# Patient Record
Sex: FEMALE | Race: WHITE | NOT HISPANIC OR LATINO | Employment: FULL TIME | URBAN - METROPOLITAN AREA
[De-identification: names, ages, dates, MRNs, and addresses within clinical notes are randomized per-mention and may not be internally consistent; named-entity substitution may affect disease eponyms.]

---

## 2017-01-10 ENCOUNTER — OFFICE VISIT (OUTPATIENT)
Dept: GASTROENTEROLOGY | Facility: CLINIC | Age: 22
End: 2017-01-10

## 2017-01-10 VITALS
HEIGHT: 65 IN | WEIGHT: 185 LBS | SYSTOLIC BLOOD PRESSURE: 102 MMHG | BODY MASS INDEX: 30.82 KG/M2 | DIASTOLIC BLOOD PRESSURE: 72 MMHG

## 2017-01-10 DIAGNOSIS — R11.0 NAUSEA: Primary | ICD-10-CM

## 2017-01-10 PROCEDURE — 99203 OFFICE O/P NEW LOW 30 MIN: CPT | Performed by: INTERNAL MEDICINE

## 2017-01-10 RX ORDER — PANTOPRAZOLE SODIUM 40 MG/1
40 TABLET, DELAYED RELEASE ORAL DAILY
Qty: 30 TABLET | Refills: 5 | Status: SHIPPED | OUTPATIENT
Start: 2017-01-10 | End: 2018-02-09 | Stop reason: SDUPTHER

## 2017-01-10 RX ORDER — LEVALBUTEROL TARTRATE 45 UG/1
2 AEROSOL, METERED ORAL EVERY 4 HOURS PRN
COMMUNITY
End: 2020-02-26

## 2017-01-10 RX ORDER — ONDANSETRON 4 MG/1
4 TABLET, ORALLY DISINTEGRATING ORAL EVERY 8 HOURS
COMMUNITY
Start: 2016-12-18 | End: 2017-04-11

## 2017-01-10 RX ORDER — POLYETHYLENE GLYCOL 3350 17 G/17G
17 POWDER, FOR SOLUTION ORAL
COMMUNITY
Start: 2016-12-18 | End: 2017-04-11

## 2017-01-10 RX ORDER — NORETHINDRONE ACETATE AND ETHINYL ESTRADIOL 1MG-20(21)
KIT ORAL
COMMUNITY
End: 2017-04-11 | Stop reason: SDUPTHER

## 2017-01-10 RX ORDER — LORATADINE 10 MG/1
CAPSULE, LIQUID FILLED ORAL
COMMUNITY
End: 2018-04-30

## 2017-01-10 NOTE — LETTER
January 12, 2017     CORONA Valle  [address] [facility] [address]    Patient: Cesia Catalan   YOB: 1995   Date of Visit: 1/10/2017       Dear CORONA Kang:    Thank you for referring Cesia Catalan to me for evaluation. Below is a copy of my consult note.    If you have questions, please do not hesitate to call me. I look forward to following Cesia along with you.         Sincerely,        Jayy Arredondo MD        CC: No Recipients    Chief Complaint   Patient presents with   • Abdominal Pain   • Nausea   • Vomiting     Subjective      HPI     Cesia Catalan is a 21 y.o. female who presents for evaluation of nausea and abdominal pain.  Symptoms present off/on for last few years.  Worse over last few months.   Some improvement with ODT zofran.    Episodes can last few weeks to few months at a time.  Pain is predominately in RUQ.  Sharp without radiation.  No relationship to food intake.  No significant unintentional weight loss.  No change in bowel habit.      Past Medical History   Diagnosis Date   • Asthma        Current Outpatient Prescriptions:   •  levalbuterol (XOPENEX HFA) 45 MCG/ACT inhaler, Inhale., Disp: , Rfl:   •  Loratadine 10 MG capsule, Take  by mouth., Disp: , Rfl:   •  Multiple Vitamin (MULTI VITAMIN PO), Take  by mouth., Disp: , Rfl:   •  norethindrone-ethinyl estradiol (JUNEL FE 1/20) 1-20 MG-MCG per tablet, Take  by mouth., Disp: , Rfl:   •  ondansetron ODT (ZOFRAN-ODT) 4 MG disintegrating tablet, Take 4 mg by mouth Every 8 (Eight) Hours., Disp: , Rfl:   •  pantoprazole (PROTONIX) 40 MG EC tablet, Take 1 tablet by mouth Daily., Disp: 30 tablet, Rfl: 5  •  polyethylene glycol (MIRALAX) powder, Take 17 g by mouth., Disp: , Rfl:      Allergies   Allergen Reactions   • Morphine    • Penicillins      Social History     Social History   • Marital status: Single     Spouse name: N/A   • Number of children: N/A   • Years of education: N/A          Occupational History   • Not on file.     Social History Main Topics   • Smoking status: Former Smoker   • Smokeless tobacco: Never Used   • Alcohol use Yes      Comment: occ   • Drug use: No   • Sexual activity: Not on file     Other Topics Concern   • Not on file     Social History Narrative   • No narrative on file     Family History   Problem Relation Age of Onset   • Irritable bowel syndrome Maternal Grandmother      Review of Systems   Constitutional: Negative.    HENT: Negative.    Respiratory: Negative.    Cardiovascular: Negative.    Gastrointestinal: Positive for abdominal pain and nausea.   Musculoskeletal: Negative.    Skin: Negative.    Psychiatric/Behavioral: Negative.         Objective   Vitals:    01/10/17 1442   BP: 102/72     Physical Exam   Constitutional: She is oriented to person, place, and time. She appears well-developed and well-nourished.   HENT:   Head: Normocephalic and atraumatic.   Mouth/Throat: Oropharynx is clear and moist.   Eyes: No scleral icterus.   Cardiovascular: Normal rate, regular rhythm and normal heart sounds.    Pulmonary/Chest: Effort normal and breath sounds normal. No respiratory distress. She has no wheezes. She has no rales.   Abdominal: Soft. Bowel sounds are normal. She exhibits no distension and no mass. There is no tenderness. No hernia.   Lymphadenopathy:     She has no cervical adenopathy.   Neurological: She is alert and oriented to person, place, and time.   Skin: Skin is dry.   Psychiatric: She has a normal mood and affect. Her behavior is normal. Judgment and thought content normal.        Assessment/Plan      Cesia was seen today for abdominal pain, nausea and vomiting.    Diagnoses and all orders for this visit:    Nausea  -     US gallbladder; Future  -     CBC & Differential  -     Comprehensive Metabolic Panel  -     Lipase  -     TSH  -     Vitamin D 25 Hydroxy  -     Celiac Ab tTG DGP TIgA    Other orders  -     pantoprazole (PROTONIX) 40 MG EC  tablet; Take 1 tablet by mouth Daily.    Follow up in 6 weeks.  If no improvement with above will consider upper endoscopic evaluation.

## 2017-01-10 NOTE — PROGRESS NOTES
Chief Complaint   Patient presents with   • Abdominal Pain   • Nausea   • Vomiting     Subjective      HPI     Cesia Catalan is a 21 y.o. female who presents for evaluation of nausea and abdominal pain.  Symptoms present off/on for last few years.  Worse over last few months.   Some improvement with ODT zofran.    Episodes can last few weeks to few months at a time.  Pain is predominately in RUQ.  Sharp without radiation.  No relationship to food intake.  No significant unintentional weight loss.  No change in bowel habit.      Past Medical History   Diagnosis Date   • Asthma        Current Outpatient Prescriptions:   •  levalbuterol (XOPENEX HFA) 45 MCG/ACT inhaler, Inhale., Disp: , Rfl:   •  Loratadine 10 MG capsule, Take  by mouth., Disp: , Rfl:   •  Multiple Vitamin (MULTI VITAMIN PO), Take  by mouth., Disp: , Rfl:   •  norethindrone-ethinyl estradiol (JUNEL FE 1/20) 1-20 MG-MCG per tablet, Take  by mouth., Disp: , Rfl:   •  ondansetron ODT (ZOFRAN-ODT) 4 MG disintegrating tablet, Take 4 mg by mouth Every 8 (Eight) Hours., Disp: , Rfl:   •  pantoprazole (PROTONIX) 40 MG EC tablet, Take 1 tablet by mouth Daily., Disp: 30 tablet, Rfl: 5  •  polyethylene glycol (MIRALAX) powder, Take 17 g by mouth., Disp: , Rfl:      Allergies   Allergen Reactions   • Morphine    • Penicillins      Social History     Social History   • Marital status: Single     Spouse name: N/A   • Number of children: N/A   • Years of education: N/A     Occupational History   • Not on file.     Social History Main Topics   • Smoking status: Former Smoker   • Smokeless tobacco: Never Used   • Alcohol use Yes      Comment: occ   • Drug use: No   • Sexual activity: Not on file     Other Topics Concern   • Not on file     Social History Narrative   • No narrative on file     Family History   Problem Relation Age of Onset   • Irritable bowel syndrome Maternal Grandmother      Review of Systems   Constitutional: Negative.    HENT: Negative.     Respiratory: Negative.    Cardiovascular: Negative.    Gastrointestinal: Positive for abdominal pain and nausea.   Musculoskeletal: Negative.    Skin: Negative.    Psychiatric/Behavioral: Negative.         Objective   Vitals:    01/10/17 1442   BP: 102/72     Physical Exam   Constitutional: She is oriented to person, place, and time. She appears well-developed and well-nourished.   HENT:   Head: Normocephalic and atraumatic.   Mouth/Throat: Oropharynx is clear and moist.   Eyes: No scleral icterus.   Cardiovascular: Normal rate, regular rhythm and normal heart sounds.    Pulmonary/Chest: Effort normal and breath sounds normal. No respiratory distress. She has no wheezes. She has no rales.   Abdominal: Soft. Bowel sounds are normal. She exhibits no distension and no mass. There is no tenderness. No hernia.   Lymphadenopathy:     She has no cervical adenopathy.   Neurological: She is alert and oriented to person, place, and time.   Skin: Skin is dry.   Psychiatric: She has a normal mood and affect. Her behavior is normal. Judgment and thought content normal.        Assessment/Plan      Cesia was seen today for abdominal pain, nausea and vomiting.    Diagnoses and all orders for this visit:    Nausea  -     US gallbladder; Future  -     CBC & Differential  -     Comprehensive Metabolic Panel  -     Lipase  -     TSH  -     Vitamin D 25 Hydroxy  -     Celiac Ab tTG DGP TIgA    Other orders  -     pantoprazole (PROTONIX) 40 MG EC tablet; Take 1 tablet by mouth Daily.    Follow up in 6 weeks.  If no improvement with above will consider upper endoscopic evaluation.

## 2017-01-10 NOTE — MR AVS SNAPSHOT
Cesia Catalan   1/10/2017 2:15 PM   Office Visit    Dept Phone:  [phone]   Encounter #:  [id number]    Provider:  Jayy Arredondo MD   Department:  [facility] GASTRO[facility]OLOG[facility]                Your Full Care Plan              Today's Medication Changes          These changes are accurate as of: 1/10/17  3:22 PM.  If you have any questions, ask your nurse or doctor.               New Medication(s)Ordered:     pantoprazole 40 MG EC tablet   Commonly known as:  PROTONIX   Take 1 tablet by mouth Daily.   Started by:  Jayy Arredondo MD            Where to Get Your Medications      These medications were sent to [facility] [id number] [place] [address] [address] [facility] AT [facility] & [facility] - [phone]  - [phone] FX  [address] [facility], [facility] [address]     Phone:  [phone]     pantoprazole 40 MG EC tablet                  Your Updated Medication List          This list is accurate as of: 1/10/17  3:22 PM.  Always use your most recent med list.                levalbuterol 45 MCG/ACT inhaler   Commonly known as:  XOPENEX HFA       Loratadine 10 MG capsule       MULTI VITAMIN PO       norethindrone-ethinyl estradiol 1-20 MG-MCG per tablet   Commonly known as:  JUNEL FE 1/20       ondansetron ODT 4 MG disintegrating tablet   Commonly known as:  ZOFRAN-ODT       pantoprazole 40 MG EC tablet   Commonly known as:  PROTONIX   Take 1 tablet by mouth Daily.       polyethylene glycol powder   Commonly known as:  MIRALAX               We Performed the Following     CBC & Differential     Celiac Ab tTG DGP TIgA     Comprehensive Metabolic Panel     Lipase     TSH     Vitamin D 25 Hydroxy       You Were Diagnosed With        Codes Comments    Nausea    -  Primary ICD-10-CM: R11.0  ICD-9-CM: 787.02       Instructions     None    Patient Instructions History      Upcoming Appointments     Visit Type Date Time Department    NEW PATIENT  "1/10/2017  2:15 PM [facility]      [employer] Signup     [facility] [employer] allows you to send messages to your doctor, view your test results, renew your prescriptions, schedule appointments, and more. To sign up, go to [employer] and click on the Sign Up Now link in the New User? box. Enter your [employer] Activation Code exactly as it appears below along with the last four digits of your Social Security Number and your Date of Birth () to complete the sign-up process. If you do not sign up before the expiration date, you must request a new code.    [employer] Activation Code: [email or web address]  Expires: 2017  3:22 PM    If you have questions, you can email [email or web address] or call [phone] to talk to our [employer] staff. Remember, [employer] is NOT to be used for urgent needs. For medical emergencies, dial 911.               Other Info from Your Visit           Allergies     Morphine      Penicillins        Reason for Visit     Abdominal Pain     Nausea     Vomiting           Vital Signs     Blood Pressure Height Weight Body Mass Index Smoking Status       102/72 65\" (165.1 cm) 185 lb (83.9 kg) 30.79 kg/m2 Former Smoker       Problems and Diagnoses Noted     Nauseous    -  Primary        "

## 2017-01-11 LAB
25(OH)D3+25(OH)D2 SERPL-MCNC: 38.5 NG/ML (ref 30–100)
ALBUMIN SERPL-MCNC: 4.5 G/DL (ref 3.5–5.2)
ALBUMIN/GLOB SERPL: 1.6 G/DL
ALP SERPL-CCNC: 51 U/L (ref 39–117)
ALT SERPL-CCNC: 24 U/L (ref 1–33)
AST SERPL-CCNC: 19 U/L (ref 1–32)
BASOPHILS # BLD AUTO: 0.05 10*3/MM3 (ref 0–0.2)
BASOPHILS NFR BLD AUTO: 0.6 % (ref 0–1.5)
BILIRUB SERPL-MCNC: 0.2 MG/DL (ref 0.1–1.2)
BUN SERPL-MCNC: 13 MG/DL (ref 6–20)
BUN/CREAT SERPL: 17.3 (ref 7–25)
CALCIUM SERPL-MCNC: 9.7 MG/DL (ref 8.6–10.5)
CHLORIDE SERPL-SCNC: 101 MMOL/L (ref 98–107)
CO2 SERPL-SCNC: 24.3 MMOL/L (ref 22–29)
CREAT SERPL-MCNC: 0.75 MG/DL (ref 0.57–1)
EOSINOPHIL # BLD AUTO: 0.41 10*3/MM3 (ref 0–0.7)
EOSINOPHIL NFR BLD AUTO: 4.6 % (ref 0.3–6.2)
ERYTHROCYTE [DISTWIDTH] IN BLOOD BY AUTOMATED COUNT: 13.2 % (ref 11.7–13)
GLIADIN PEPTIDE IGA SER-ACNC: 2 UNITS (ref 0–19)
GLIADIN PEPTIDE IGG SER-ACNC: 2 UNITS (ref 0–19)
GLOBULIN SER CALC-MCNC: 2.9 GM/DL
GLUCOSE SERPL-MCNC: 81 MG/DL (ref 65–99)
HCT VFR BLD AUTO: 39.7 % (ref 35.6–45.5)
HGB BLD-MCNC: 13 G/DL (ref 11.9–15.5)
IGA SERPL-MCNC: 130 MG/DL (ref 87–352)
IMM GRANULOCYTES # BLD: 0 10*3/MM3 (ref 0–0.03)
IMM GRANULOCYTES NFR BLD: 0 % (ref 0–0.5)
LIPASE SERPL-CCNC: 28 U/L (ref 13–60)
LYMPHOCYTES # BLD AUTO: 2.41 10*3/MM3 (ref 0.9–4.8)
LYMPHOCYTES NFR BLD AUTO: 26.8 % (ref 19.6–45.3)
MCH RBC QN AUTO: 28.2 PG (ref 26.9–32)
MCHC RBC AUTO-ENTMCNC: 32.7 G/DL (ref 32.4–36.3)
MCV RBC AUTO: 86.1 FL (ref 80.5–98.2)
MONOCYTES # BLD AUTO: 0.52 10*3/MM3 (ref 0.2–1.2)
MONOCYTES NFR BLD AUTO: 5.8 % (ref 5–12)
NEUTROPHILS # BLD AUTO: 5.61 10*3/MM3 (ref 1.9–8.1)
NEUTROPHILS NFR BLD AUTO: 62.2 % (ref 42.7–76)
PLATELET # BLD AUTO: 379 10*3/MM3 (ref 140–500)
POTASSIUM SERPL-SCNC: 4.3 MMOL/L (ref 3.5–5.2)
PROT SERPL-MCNC: 7.4 G/DL (ref 6–8.5)
RBC # BLD AUTO: 4.61 10*6/MM3 (ref 3.9–5.2)
SODIUM SERPL-SCNC: 140 MMOL/L (ref 136–145)
TSH SERPL DL<=0.005 MIU/L-ACNC: 1.62 MIU/ML (ref 0.27–4.2)
TTG IGA SER-ACNC: <2 U/ML (ref 0–3)
TTG IGG SER-ACNC: <2 U/ML (ref 0–5)
WBC # BLD AUTO: 9 10*3/MM3 (ref 4.5–10.7)

## 2017-01-17 ENCOUNTER — HOSPITAL ENCOUNTER (OUTPATIENT)
Dept: ULTRASOUND IMAGING | Facility: HOSPITAL | Age: 22
Discharge: HOME | End: 2017-01-17
Attending: INTERNAL MEDICINE | Admitting: INTERNAL MEDICINE

## 2017-01-17 DIAGNOSIS — R11.0 NAUSEA: ICD-10-CM

## 2017-01-17 PROCEDURE — 76705 ECHO EXAM OF ABDOMEN: CPT

## 2017-01-26 ENCOUNTER — TELEPHONE (OUTPATIENT)
Dept: GASTROENTEROLOGY | Facility: CLINIC | Age: 22
End: 2017-01-26

## 2017-01-26 NOTE — TELEPHONE ENCOUNTER
Patient called, no answer, message left advising her recent lab work was normal. Advised to call back for questions.

## 2017-02-08 ENCOUNTER — TELEPHONE (OUTPATIENT)
Dept: GASTROENTEROLOGY | Facility: CLINIC | Age: 22
End: 2017-02-08

## 2017-02-08 NOTE — TELEPHONE ENCOUNTER
----- Message from Jayy Arredondo MD sent at 1/18/2017  3:19 PM EST -----  RUQ u/s normal.  If not improved with PPI started in office at last visit, can we arrange for HIDA scan with [facility].  Thanks.

## 2017-02-08 NOTE — TELEPHONE ENCOUNTER
Called pt and advised that per Dr Arredondo: her U/S was normal and MD wants to know if her symptoms have improved with the pantoprazole. Pt verb understanding and states she is feeling much better on the medication. Advised that as long as she is feeling better, she can continue the medication. Advised that if symptoms return, MD recommends next step would be a HIDA scan to assess gallbladder function. Pt verb understanding and states she is feeling fine for now but does need to make her 6 week f/u appt. Appt made for 2/21/17 at 2 PM.

## 2017-02-21 ENCOUNTER — OFFICE VISIT (OUTPATIENT)
Dept: GASTROENTEROLOGY | Facility: CLINIC | Age: 22
End: 2017-02-21

## 2017-02-21 VITALS
HEIGHT: 65 IN | WEIGHT: 180.8 LBS | BODY MASS INDEX: 30.12 KG/M2 | SYSTOLIC BLOOD PRESSURE: 104 MMHG | DIASTOLIC BLOOD PRESSURE: 74 MMHG

## 2017-02-21 DIAGNOSIS — R11.0 NAUSEA: Primary | ICD-10-CM

## 2017-02-21 DIAGNOSIS — R10.32 LEFT LOWER QUADRANT PAIN: ICD-10-CM

## 2017-02-21 PROCEDURE — 99214 OFFICE O/P EST MOD 30 MIN: CPT | Performed by: INTERNAL MEDICINE

## 2017-02-21 RX ORDER — DICYCLOMINE HCL 20 MG
10 TABLET ORAL
Qty: 60 TABLET | Refills: 5 | Status: SHIPPED | OUTPATIENT
Start: 2017-02-21 | End: 2017-03-23

## 2017-02-21 NOTE — PROGRESS NOTES
Chief Complaint   Patient presents with   • Abdominal Pain   • Nausea     Subjective     HPI     Cesia Catalan is a 21 y.o. female who presents for follow-up of her nausea and abdominal pain.  She's had near complete resolution of her nausea on Protonix.  She reports no longer having any right-sided abdominal pain.  She is complaining of some mild but constant sharp left lower quadrant abdominal pain which is been present for last few weeks.  This pain is not associated with any particular movements.  There is no relationship to her bowels.  She reports normal daily bowel movement.  She's had recent abdominal ultrasound which is been unremarkable.  She had normal blood work in January.    Past Medical History   Diagnosis Date   • Asthma        Social History     Social History   • Marital status: Single     Spouse name: N/A   • Number of children: N/A   • Years of education: N/A     Social History Main Topics   • Smoking status: Former Smoker   • Smokeless tobacco: Never Used   • Alcohol use Yes      Comment: occ   • Drug use: No   • Sexual activity: Not Asked     Other Topics Concern   • None     Social History Narrative       Current Outpatient Prescriptions:   •  levalbuterol (XOPENEX HFA) 45 MCG/ACT inhaler, Inhale., Disp: , Rfl:   •  Loratadine 10 MG capsule, Take  by mouth., Disp: , Rfl:   •  Multiple Vitamin (MULTI VITAMIN PO), Take  by mouth., Disp: , Rfl:   •  norethindrone-ethinyl estradiol (JUNEL FE 1/20) 1-20 MG-MCG per tablet, Take  by mouth., Disp: , Rfl:   •  ondansetron ODT (ZOFRAN-ODT) 4 MG disintegrating tablet, Take 4 mg by mouth Every 8 (Eight) Hours., Disp: , Rfl:   •  pantoprazole (PROTONIX) 40 MG EC tablet, Take 1 tablet by mouth Daily., Disp: 30 tablet, Rfl: 5  •  polyethylene glycol (MIRALAX) powder, Take 17 g by mouth., Disp: , Rfl:   •  dicyclomine (BENTYL) 20 MG tablet, Take 0.5 tablets by mouth 4 (Four) Times a Day Before Meals & at Bedtime As Needed (cramping) for up to 30 days., Disp:  60 tablet, Rfl: 5    Review of Systems   Constitutional: Negative.    Respiratory: Negative.    Cardiovascular: Negative.    Gastrointestinal: Positive for abdominal pain. Negative for constipation, diarrhea and nausea.   Psychiatric/Behavioral: Negative.        Objective   Vitals:    02/21/17 1508   BP: 104/74       Physical Exam   Constitutional: She is oriented to person, place, and time. She appears well-developed and well-nourished.   HENT:   Head: Normocephalic and atraumatic.   Abdominal: Soft. Bowel sounds are normal. She exhibits no distension and no mass. There is tenderness. No hernia.   Mild TTP in LLQ   Neurological: She is alert and oriented to person, place, and time.   Skin: Skin is dry.   Psychiatric: She has a normal mood and affect. Her behavior is normal. Judgment and thought content normal.   Vitals reviewed.      Assessment/Plan      Cesia was seen today for abdominal pain and nausea.    Diagnoses and all orders for this visit:    Nausea    Left lower quadrant pain    Other orders  -     dicyclomine (BENTYL) 20 MG tablet; Take 0.5 tablets by mouth 4 (Four) Times a Day Before Meals & at Bedtime As Needed (cramping) for up to 30 days.    Cesia's nausea seems to have significantly improved with PPI.  I would recommend that we have a trial off medication to assess her response but she can certainly restart the Protonix and use on an as-needed basis if her symptoms recur.  She is now also complaining of some sharp left lower quadrant pain which is not obviously related to her bowel habit.  Etiology is unclear.  I've given her prescription for as needed dicyclomine and would like her to touch base with me in about 4 weeks and let me know her response.  If her pain has not improved or worsens then we would consider a CT scan of the abdomen pelvis at that point for further evaluation

## 2017-04-11 ENCOUNTER — OFFICE VISIT (OUTPATIENT)
Dept: OBSTETRICS AND GYNECOLOGY | Age: 22
End: 2017-04-11

## 2017-04-11 VITALS
BODY MASS INDEX: 29.92 KG/M2 | DIASTOLIC BLOOD PRESSURE: 68 MMHG | HEIGHT: 65 IN | WEIGHT: 179.6 LBS | SYSTOLIC BLOOD PRESSURE: 124 MMHG

## 2017-04-11 DIAGNOSIS — Z12.4 ROUTINE CERVICAL SMEAR: ICD-10-CM

## 2017-04-11 DIAGNOSIS — Z11.51 SPECIAL SCREENING EXAMINATION FOR HUMAN PAPILLOMAVIRUS (HPV): ICD-10-CM

## 2017-04-11 DIAGNOSIS — Z00.00 ANNUAL PHYSICAL EXAM: Primary | ICD-10-CM

## 2017-04-11 DIAGNOSIS — K21.9 GASTROESOPHAGEAL REFLUX DISEASE, ESOPHAGITIS PRESENCE NOT SPECIFIED: ICD-10-CM

## 2017-04-11 DIAGNOSIS — N94.6 DYSMENORRHEA: ICD-10-CM

## 2017-04-11 PROCEDURE — 99385 PREV VISIT NEW AGE 18-39: CPT | Performed by: OBSTETRICS & GYNECOLOGY

## 2017-04-11 RX ORDER — NORETHINDRONE ACETATE AND ETHINYL ESTRADIOL 1MG-20(21)
1 KIT ORAL DAILY
Qty: 28 TABLET | Refills: 12 | Status: SHIPPED | OUTPATIENT
Start: 2017-04-11 | End: 2017-04-13

## 2017-04-11 NOTE — PROGRESS NOTES
Subjective   Cesia Catalan is a 21 y.o. female is being seen today for New pt Annual Exam  Chief Complaint   Patient presents with   • Gynecologic Exam   .    History of Present Illness  Patient presents as a new patient for an annual exam and refill of her birth control pills.  She's had a couple of minor changes lately 1 her cycles are on time and regular but the flow is getting less and less.  She was reassured about that.  The problem is last month for the first time ever she had pretty severe cramps on her cycle.  This is the first time this has happened and she doesn't recall anything else abnormal during that timeframe.  She moved here from [place] necrosis family is here today in here just for 9 months and has a steady significant other at this time.  She works as a  in a primary care's office and otherwise has no complaints  The following portions of the patient's history were reviewed and updated as appropriate: allergies, current medications, past family history, past medical history, past social history, past surgical history and problem list.    PAST MEDICAL HISTORY  Past Medical History:   Diagnosis Date   • Asthma      OB History     No data available        Past Surgical History:   Procedure Laterality Date   • CYST REMOVAL     • NASAL SEPTUM SURGERY       Family History   Problem Relation Age of Onset   • Irritable bowel syndrome Maternal Grandmother      History   Smoking Status   • Former Smoker   Smokeless Tobacco   • Never Used       Current Outpatient Prescriptions:   •  levalbuterol (XOPENEX HFA) 45 MCG/ACT inhaler, Inhale., Disp: , Rfl:   •  Loratadine 10 MG capsule, Take  by mouth., Disp: , Rfl:   •  Multiple Vitamin (MULTI VITAMIN PO), Take  by mouth., Disp: , Rfl:   •  norethindrone-ethinyl estradiol (JUNEL FE 1/20) 1-20 MG-MCG per tablet, Take 1 tablet by mouth Daily., Disp: 28 tablet, Rfl: 12  •  pantoprazole (PROTONIX) 40 MG EC tablet, Take 1 tablet by mouth Daily., Disp:  30 tablet, Rfl: 5    There is no immunization history on file for this patient.    Review of Systems   Constitutional: Negative for chills, fatigue, fever and unexpected weight change.   Respiratory: Negative for shortness of breath and wheezing.    Cardiovascular: Negative for chest pain.   Gastrointestinal: Negative for abdominal distention, abdominal pain, blood in stool, constipation, diarrhea and nausea.   Genitourinary: Negative for difficulty urinating, dyspareunia, dysuria, frequency, hematuria, menstrual problem, pelvic pain, urgency and vaginal discharge.   Skin: Negative for rash.       Objective   Physical Exam   Constitutional: She is oriented to person, place, and time. Vital signs are normal. She appears well-developed and well-nourished.   Neck: No thyromegaly present.   Cardiovascular: Normal rate, regular rhythm and normal heart sounds.    Pulmonary/Chest: Effort normal. Right breast exhibits no inverted nipple, no mass, no nipple discharge, no skin change and no tenderness. Left breast exhibits no inverted nipple, no mass, no nipple discharge, no skin change and no tenderness. Breasts are symmetrical. There is no breast swelling.   Abdominal: Soft.   Genitourinary: Vagina normal and uterus normal. No breast tenderness, discharge or bleeding. Pelvic exam was performed with patient supine. No labial fusion. There is no rash, tenderness, lesion or injury on the right labia. There is no rash, tenderness, lesion or injury on the left labia. Cervix exhibits no motion tenderness, no discharge and no friability. Right adnexum displays no mass, no tenderness and no fullness. Left adnexum displays no mass, no tenderness and no fullness.   Neurological: She is alert and oriented to person, place, and time.   Psychiatric: She has a normal mood and affect.   Vitals reviewed.        Assessment/Plan   Cesia was seen today for gynecologic exam.    Diagnoses and all orders for this visit:    Annual physical  exam    Dysmenorrhea    Gastroesophageal reflux disease, esophagitis presence not specified    Other orders  -     norethindrone-ethinyl estradiol (JUNEL FE 1/20) 1-20 MG-MCG per tablet; Take 1 tablet by mouth Daily.      All in all she had a normal exam today.  Pap smear was done today.  It was her first Pap smear.  Refill the pill and started her to watch the flow everyday could decrease his did not think I will change her pill and if the cramps keep up another 2 months and let me know and we'll probably proceed with an ultrasound.  Come back in a year

## 2017-04-13 ENCOUNTER — TELEPHONE (OUTPATIENT)
Dept: OBSTETRICS AND GYNECOLOGY | Age: 22
End: 2017-04-13

## 2017-04-13 RX ORDER — NORETHINDRONE ACETATE AND ETHINYL ESTRADIOL 1.5-30(21)
1 KIT ORAL DAILY
Qty: 28 TABLET | Refills: 12 | Status: SHIPPED | OUTPATIENT
Start: 2017-04-13 | End: 2018-04-13

## 2017-04-15 LAB
CYTOLOGIST CVX/VAG CYTO: ABNORMAL
CYTOLOGY CVX/VAG DOC THIN PREP: ABNORMAL
DX ICD CODE: ABNORMAL
DX ICD CODE: ABNORMAL
HIV 1 & 2 AB SER-IMP: ABNORMAL
HPV I/H RISK 4 DNA CVX QL PROBE+SIG AMP: NEGATIVE
OTHER STN SPEC: ABNORMAL
PATH REPORT.FINAL DX SPEC: ABNORMAL
PATHOLOGIST CVX/VAG CYTO: ABNORMAL
STAT OF ADQ CVX/VAG CYTO-IMP: ABNORMAL

## 2017-04-24 ENCOUNTER — TELEPHONE (OUTPATIENT)
Dept: OBSTETRICS AND GYNECOLOGY | Age: 22
End: 2017-04-24

## 2017-04-25 ENCOUNTER — TELEPHONE (OUTPATIENT)
Dept: OBSTETRICS AND GYNECOLOGY | Age: 22
End: 2017-04-25

## 2017-04-25 NOTE — TELEPHONE ENCOUNTER
----- Message from Brijesh Beaver MD sent at 4/24/2017  4:34 PM EDT -----  Tell patient she had a essentially are normal Pap.  It did show a few mildly atypical cells but because the HPV is negative all renewed to do repeat this in 1 year

## 2017-06-18 ENCOUNTER — HOSPITAL ENCOUNTER (EMERGENCY)
Facility: HOSPITAL | Age: 22
Discharge: HOME | End: 2017-06-18
Attending: EMERGENCY MEDICINE | Admitting: EMERGENCY MEDICINE

## 2017-06-18 ENCOUNTER — APPOINTMENT (OUTPATIENT)
Dept: CT IMAGING | Facility: HOSPITAL | Age: 22
End: 2017-06-18

## 2017-06-18 VITALS
TEMPERATURE: 98.1 F | BODY MASS INDEX: 29.16 KG/M2 | OXYGEN SATURATION: 98 % | SYSTOLIC BLOOD PRESSURE: 117 MMHG | HEART RATE: 70 BPM | HEIGHT: 65 IN | DIASTOLIC BLOOD PRESSURE: 74 MMHG | WEIGHT: 175 LBS | RESPIRATION RATE: 18 BRPM

## 2017-06-18 DIAGNOSIS — N39.0 ACUTE UTI: Primary | ICD-10-CM

## 2017-06-18 LAB
ALBUMIN SERPL-MCNC: 4.4 G/DL (ref 3.5–5.2)
ALBUMIN/GLOB SERPL: 1.3 G/DL
ALP SERPL-CCNC: 52 U/L (ref 39–117)
ALT SERPL W P-5'-P-CCNC: 30 U/L (ref 1–33)
ANION GAP SERPL CALCULATED.3IONS-SCNC: 15.9 MMOL/L
AST SERPL-CCNC: 26 U/L (ref 1–32)
BACTERIA UR QL AUTO: ABNORMAL /HPF
BASOPHILS # BLD AUTO: 0.08 10*3/MM3 (ref 0–0.2)
BASOPHILS NFR BLD AUTO: 0.7 % (ref 0–1.5)
BILIRUB SERPL-MCNC: 0.2 MG/DL (ref 0.1–1.2)
BILIRUB UR QL STRIP: NEGATIVE
BUN BLD-MCNC: 11 MG/DL (ref 6–20)
BUN/CREAT SERPL: 11.8 (ref 7–25)
CALCIUM SPEC-SCNC: 9.3 MG/DL (ref 8.6–10.5)
CHLORIDE SERPL-SCNC: 103 MMOL/L (ref 98–107)
CLARITY UR: ABNORMAL
CO2 SERPL-SCNC: 23.1 MMOL/L (ref 22–29)
COLOR UR: ABNORMAL
CREAT BLD-MCNC: 0.93 MG/DL (ref 0.57–1)
DEPRECATED RDW RBC AUTO: 39.7 FL (ref 37–54)
EOSINOPHIL # BLD AUTO: 0.39 10*3/MM3 (ref 0–0.7)
EOSINOPHIL NFR BLD AUTO: 3.4 % (ref 0.3–6.2)
ERYTHROCYTE [DISTWIDTH] IN BLOOD BY AUTOMATED COUNT: 13.1 % (ref 11.7–13)
GFR SERPL CREATININE-BSD FRML MDRD: 75 ML/MIN/1.73
GLOBULIN UR ELPH-MCNC: 3.4 GM/DL
GLUCOSE BLD-MCNC: 83 MG/DL (ref 65–99)
GLUCOSE UR STRIP-MCNC: NEGATIVE MG/DL
HCG SERPL QL: NEGATIVE
HCT VFR BLD AUTO: 38 % (ref 35.6–45.5)
HGB BLD-MCNC: 12.9 G/DL (ref 11.9–15.5)
HGB UR QL STRIP.AUTO: ABNORMAL
HOLD SPECIMEN: NORMAL
HYALINE CASTS UR QL AUTO: ABNORMAL /LPF
IMM GRANULOCYTES # BLD: 0.03 10*3/MM3 (ref 0–0.03)
IMM GRANULOCYTES NFR BLD: 0.3 % (ref 0–0.5)
KETONES UR QL STRIP: ABNORMAL
LEUKOCYTE ESTERASE UR QL STRIP.AUTO: ABNORMAL
LIPASE SERPL-CCNC: 22 U/L (ref 13–60)
LYMPHOCYTES # BLD AUTO: 2.77 10*3/MM3 (ref 0.9–4.8)
LYMPHOCYTES NFR BLD AUTO: 24.2 % (ref 19.6–45.3)
MCH RBC QN AUTO: 27.9 PG (ref 26.9–32)
MCHC RBC AUTO-ENTMCNC: 33.9 G/DL (ref 32.4–36.3)
MCV RBC AUTO: 82.3 FL (ref 80.5–98.2)
MONOCYTES # BLD AUTO: 0.69 10*3/MM3 (ref 0.2–1.2)
MONOCYTES NFR BLD AUTO: 6 % (ref 5–12)
NEUTROPHILS # BLD AUTO: 7.48 10*3/MM3 (ref 1.9–8.1)
NEUTROPHILS NFR BLD AUTO: 65.4 % (ref 42.7–76)
NITRITE UR QL STRIP: NEGATIVE
PH UR STRIP.AUTO: 5.5 [PH] (ref 5–8)
PLATELET # BLD AUTO: 345 10*3/MM3 (ref 140–500)
PMV BLD AUTO: 10 FL (ref 6–12)
POTASSIUM BLD-SCNC: 3.5 MMOL/L (ref 3.5–5.2)
PROT SERPL-MCNC: 7.8 G/DL (ref 6–8.5)
PROT UR QL STRIP: ABNORMAL
RBC # BLD AUTO: 4.62 10*6/MM3 (ref 3.9–5.2)
RBC # UR: ABNORMAL /HPF
REF LAB TEST METHOD: ABNORMAL
SODIUM BLD-SCNC: 142 MMOL/L (ref 136–145)
SP GR UR STRIP: 1.02 (ref 1–1.03)
SQUAMOUS #/AREA URNS HPF: ABNORMAL /HPF
UROBILINOGEN UR QL STRIP: ABNORMAL
WBC NRBC COR # BLD: 11.44 10*3/MM3 (ref 4.5–10.7)
WBC UR QL AUTO: ABNORMAL /HPF
WHOLE BLOOD HOLD SPECIMEN: NORMAL
WHOLE BLOOD HOLD SPECIMEN: NORMAL

## 2017-06-18 PROCEDURE — 87186 SC STD MICRODIL/AGAR DIL: CPT | Performed by: EMERGENCY MEDICINE

## 2017-06-18 PROCEDURE — 25010000002 KETOROLAC TROMETHAMINE PER 15 MG: Performed by: EMERGENCY MEDICINE

## 2017-06-18 PROCEDURE — 96375 TX/PRO/DX INJ NEW DRUG ADDON: CPT

## 2017-06-18 PROCEDURE — 85025 COMPLETE CBC W/AUTO DIFF WBC: CPT | Performed by: EMERGENCY MEDICINE

## 2017-06-18 PROCEDURE — 25010000003 CEFTRIAXONE PER 250 MG: Performed by: EMERGENCY MEDICINE

## 2017-06-18 PROCEDURE — 96361 HYDRATE IV INFUSION ADD-ON: CPT

## 2017-06-18 PROCEDURE — 96365 THER/PROPH/DIAG IV INF INIT: CPT

## 2017-06-18 PROCEDURE — 74176 CT ABD & PELVIS W/O CONTRAST: CPT

## 2017-06-18 PROCEDURE — 84703 CHORIONIC GONADOTROPIN ASSAY: CPT | Performed by: EMERGENCY MEDICINE

## 2017-06-18 PROCEDURE — 81001 URINALYSIS AUTO W/SCOPE: CPT | Performed by: EMERGENCY MEDICINE

## 2017-06-18 PROCEDURE — 80053 COMPREHEN METABOLIC PANEL: CPT | Performed by: EMERGENCY MEDICINE

## 2017-06-18 PROCEDURE — 83690 ASSAY OF LIPASE: CPT | Performed by: EMERGENCY MEDICINE

## 2017-06-18 PROCEDURE — 99283 EMERGENCY DEPT VISIT LOW MDM: CPT

## 2017-06-18 PROCEDURE — 87086 URINE CULTURE/COLONY COUNT: CPT | Performed by: EMERGENCY MEDICINE

## 2017-06-18 RX ORDER — CETIRIZINE HYDROCHLORIDE 10 MG/1
10 TABLET ORAL DAILY
COMMUNITY
End: 2022-06-20

## 2017-06-18 RX ORDER — CEFTRIAXONE SODIUM 1 G/50ML
1 INJECTION, SOLUTION INTRAVENOUS ONCE
Status: COMPLETED | OUTPATIENT
Start: 2017-06-18 | End: 2017-06-18

## 2017-06-18 RX ORDER — SULFAMETHOXAZOLE AND TRIMETHOPRIM 800; 160 MG/1; MG/1
1 TABLET ORAL 2 TIMES DAILY
Qty: 14 TABLET | Refills: 0 | Status: SHIPPED | OUTPATIENT
Start: 2017-06-18 | End: 2018-04-17

## 2017-06-18 RX ORDER — ONDANSETRON 4 MG/1
4 TABLET, FILM COATED ORAL EVERY 6 HOURS PRN
Qty: 14 TABLET | Refills: 0 | Status: SHIPPED | OUTPATIENT
Start: 2017-06-18 | End: 2018-04-17

## 2017-06-18 RX ORDER — PHENAZOPYRIDINE HYDROCHLORIDE 200 MG/1
200 TABLET, FILM COATED ORAL 3 TIMES DAILY PRN
Qty: 10 TABLET | Refills: 0 | Status: SHIPPED | OUTPATIENT
Start: 2017-06-18 | End: 2018-04-17

## 2017-06-18 RX ORDER — SODIUM CHLORIDE 0.9 % (FLUSH) 0.9 %
10 SYRINGE (ML) INJECTION AS NEEDED
Status: DISCONTINUED | OUTPATIENT
Start: 2017-06-18 | End: 2017-06-18 | Stop reason: HOSPADM

## 2017-06-18 RX ORDER — KETOROLAC TROMETHAMINE 30 MG/ML
30 INJECTION, SOLUTION INTRAMUSCULAR; INTRAVENOUS ONCE
Status: COMPLETED | OUTPATIENT
Start: 2017-06-18 | End: 2017-06-18

## 2017-06-18 RX ADMIN — KETOROLAC TROMETHAMINE 30 MG: 30 INJECTION, SOLUTION INTRAMUSCULAR at 18:45

## 2017-06-18 RX ADMIN — CEFTRIAXONE SODIUM 1 G: 1 INJECTION, SOLUTION INTRAVENOUS at 18:52

## 2017-06-18 RX ADMIN — SODIUM CHLORIDE 1000 ML: 9 INJECTION, SOLUTION INTRAVENOUS at 18:39

## 2017-06-18 NOTE — ED TRIAGE NOTES
Pt c/o left lower abdominal pain, increased urination, and blood in her urine since yesterday morning.

## 2017-06-18 NOTE — ED PROVIDER NOTES
EMERGENCY DEPARTMENT ENCOUNTER    CHIEF COMPLAINT  Chief Complaint: abdominal pain, blood in urine  History given by: patient  History limited by: nothing  Room Number: 05/05  PMD: CORONA Valle      HPI:  Pt is a 21 y.o. female who presents complaining of hematuria and constant LLQ abdominal pain onset yesterday. Pt also c/o urinary frequency and nausea but denies dysuria, vomiting, and diarrhea. Pt states similar sx with severe UTI but denies any hx of kidney stones. LMP 6/5/17    Duration:  2 days  Onset: gradual  Timing: constant  Location: LLQ  Radiation: none  Intensity/Severity: moderate  Progression: unchanged   Associated Symptoms: urinary frequency, nausea  Aggravating Factors: none  Alleviating Factors: none  Previous Episodes: Pt states hx of similar sx with UTI  Treatment before arrival: none    PAST MEDICAL HISTORY  Active Ambulatory Problems     Diagnosis Date Noted   • No Active Ambulatory Problems     Resolved Ambulatory Problems     Diagnosis Date Noted   • No Resolved Ambulatory Problems     Past Medical History:   Diagnosis Date   • Asthma    • Environmental allergies    • GERD (gastroesophageal reflux disease)        PAST SURGICAL HISTORY  Past Surgical History:   Procedure Laterality Date   • CYST REMOVAL     • NASAL SEPTUM SURGERY         FAMILY HISTORY  Family History   Problem Relation Age of Onset   • Irritable bowel syndrome Maternal Grandmother        SOCIAL HISTORY  Social History     Social History   • Marital status: Single     Spouse name: N/A   • Number of children: N/A   • Years of education: N/A     Occupational History   • Not on file.     Social History Main Topics   • Smoking status: Former Smoker   • Smokeless tobacco: Never Used   • Alcohol use Yes      Comment: occ   • Drug use: No   • Sexual activity: Defer     Other Topics Concern   • Not on file     Social History Narrative   • No narrative on file       ALLERGIES  Morphine and Penicillins    REVIEW OF  SYSTEMS  Review of Systems   Constitutional: Negative for chills and fever.   HENT: Negative for sore throat and trouble swallowing.    Eyes: Negative for visual disturbance.   Respiratory: Negative for cough and shortness of breath.    Cardiovascular: Negative for chest pain, palpitations and leg swelling.   Gastrointestinal: Positive for abdominal pain (LLQ) and nausea. Negative for diarrhea and vomiting.   Genitourinary: Positive for frequency and hematuria. Negative for decreased urine volume and dysuria.   Musculoskeletal: Negative for neck pain.   Skin: Negative for rash.   Neurological: Negative for syncope, weakness, numbness and headaches.   All other systems reviewed and are negative.      PHYSICAL EXAM  ED Triage Vitals   Temp Heart Rate Resp BP SpO2   06/18/17 1620 06/18/17 1620 06/18/17 1647 06/18/17 1647 06/18/17 1620   98.4 °F (36.9 °C) 87 16 134/81 98 %      Temp src Heart Rate Source Patient Position BP Location FiO2 (%)   06/18/17 1620 06/18/17 1620 06/18/17 1647 06/18/17 1647 --   Tympanic Monitor Sitting Right arm        Physical Exam   Constitutional: She is oriented to person, place, and time and well-developed, well-nourished, and in no distress. No distress.   HENT:   Head: Normocephalic and atraumatic.   Eyes: EOM are normal. Pupils are equal, round, and reactive to light.   Neck: Normal range of motion. Neck supple.   Cardiovascular: Normal rate, regular rhythm and normal heart sounds.    Pulmonary/Chest: Effort normal and breath sounds normal. No respiratory distress.   Abdominal: Soft. There is tenderness (LLQ>RLQ) in the right lower quadrant and left lower quadrant. There is CVA tenderness (left). There is no rebound and no guarding.   Musculoskeletal: Normal range of motion. She exhibits no edema.   Neurological: She is alert and oriented to person, place, and time. She has normal sensation and normal strength.   Skin: Skin is warm and dry. No rash noted.   Psychiatric: Mood and affect  normal.   Nursing note and vitals reviewed.      LAB RESULTS  Lab Results (last 24 hours)     Procedure Component Value Units Date/Time    Urinalysis With / Culture If Indicated [[id number]]  (Abnormal) Collected:  06/18/17 1749    Specimen:  Urine from Urine, Clean Catch Updated:  06/18/17 1802     Color, UA Dark Yellow (A)     Appearance, UA Cloudy (A)     pH, UA 5.5     Specific Gravity, UA 1.025     Glucose, UA Negative     Ketones, UA Trace (A)     Bilirubin, UA Negative     Blood, UA Large (3+) (A)     Protein, UA 30 mg/dL (1+) (A)     Leuk Esterase, UA Moderate (2+) (A)     Nitrite, UA Negative     Urobilinogen, UA 0.2 E.U./dL    Urinalysis, Microscopic Only [[id number]]  (Abnormal) Collected:  06/18/17 1749    Specimen:  Urine from Urine, Clean Catch Updated:  06/18/17 1802     RBC, UA Too Numerous to Count (A) /HPF      WBC, UA Too Numerous to Count (A) /HPF      Bacteria, UA 1+ (A) /HPF      Squamous Epithelial Cells, UA 0-2 /HPF      Hyaline Casts, UA 0-2 /LPF      Methodology Automated Microscopy    Urine Culture [[id number]] Collected:  06/18/17 1749    Specimen:  Urine from Urine, Clean Catch Updated:  06/18/17 1759    CBC & Differential [[id number]] Collected:  06/18/17 1837    Specimen:  Blood Updated:  06/18/17 1851    Narrative:       The following orders were created for panel order CBC & Differential.  Procedure                               Abnormality         Status                     ---------                               -----------         ------                     CBC Auto Differential[[id number]]        Abnormal            Final result                 Please view results for these tests on the individual orders.    Comprehensive Metabolic Panel [[id number]] Collected:  06/18/17 1837    Specimen:  Blood Updated:  06/18/17 1913     Glucose 83 mg/dL      BUN 11 mg/dL      Creatinine 0.93 mg/dL      Sodium 142 mmol/L      Potassium 3.5 mmol/L      Chloride 103 mmol/L      CO2 23.1 mmol/L       Calcium 9.3 mg/dL      Total Protein 7.8 g/dL      Albumin 4.40 g/dL      ALT (SGPT) 30 U/L      AST (SGOT) 26 U/L      Alkaline Phosphatase 52 U/L      Total Bilirubin 0.2 mg/dL      eGFR Non African Amer 75 mL/min/1.73      Globulin 3.4 gm/dL      A/G Ratio 1.3 g/dL      BUN/Creatinine Ratio 11.8     Anion Gap 15.9 mmol/L     Lipase [[id number]]  (Normal) Collected:  06/18/17 1837    Specimen:  Blood Updated:  06/18/17 1913     Lipase 22 U/L     hCG, Serum, Qualitative [[id number]]  (Normal) Collected:  06/18/17 1837    Specimen:  Blood Updated:  06/18/17 1913     HCG Qualitative Negative    CBC Auto Differential [[id number]]  (Abnormal) Collected:  06/18/17 1837    Specimen:  Blood Updated:  06/18/17 1851     WBC 11.44 (H) 10*3/mm3      RBC 4.62 10*6/mm3      Hemoglobin 12.9 g/dL      Hematocrit 38.0 %      MCV 82.3 fL      MCH 27.9 pg      MCHC 33.9 g/dL      RDW 13.1 (H) %      RDW-SD 39.7 fl      MPV 10.0 fL      Platelets 345 10*3/mm3      Neutrophil % 65.4 %      Lymphocyte % 24.2 %      Monocyte % 6.0 %      Eosinophil % 3.4 %      Basophil % 0.7 %      Immature Grans % 0.3 %      Neutrophils, Absolute 7.48 10*3/mm3      Lymphocytes, Absolute 2.77 10*3/mm3      Monocytes, Absolute 0.69 10*3/mm3      Eosinophils, Absolute 0.39 10*3/mm3      Basophils, Absolute 0.08 10*3/mm3      Immature Grans, Absolute 0.03 10*3/mm3       I ordered the above labs and reviewed the results    RADIOLOGY  CT Abdomen Pelvis Without Contrast   Preliminary Result   No definite acute abdominal pathology, no obstructive uropathy or   inflammatory bowel disease.                     I ordered the above noted radiological studies. Interpreted by radiologist.  Reviewed by me in PACS.       PROCEDURES  Procedures      PROGRESS AND CONSULTS  ED Course     5:57 PM  Ordered CT abd/pelvis for further evaluation. Ordered toradol for pain.     6:16 PM  Ordered rocephin to treat infection.     8:01 PM  Pt rechecked and is resting comfortably. BP-  134/81 HR- 87 Temp- 98.4 °F (36.9 °C) (Tympanic) O2 sat- 98%. All pertinent lab/imaging/EKG findings discussed including no kidney stone seen on CT and UTI seen on UA. Pt/family verbalized understanding and agree with plan to discharge with abx. All questions and concerns addressed at this time.         MEDICAL DECISION MAKING  Results were reviewed/discussed with the patient and they were also made aware of online access. Pt also made aware that some labs, such as cultures, will not be resulted during ER visit and follow up with PMD is necessary.     MDM  Number of Diagnoses or Management Options  Acute UTI:      Amount and/or Complexity of Data Reviewed  Clinical lab tests: ordered and reviewed (UA: too numerous to count RBC & WBC)  Tests in the radiology section of CPT®: ordered and reviewed (CT abd/pelvis: Negative acute)  Independent visualization of images, tracings, or specimens: yes    Patient Progress  Patient progress: stable         DIAGNOSIS  Final diagnoses:   Acute UTI       DISPOSITION  Disposition: Discharged.    Patient discharged in stable condition.    Reviewed implications of results, diagnosis, meds, responsibility to follow up, warning signs and symptoms of possible worsening, potential complications and reasons to return to ER.    Patient/Family voiced understanding of above instructions.    Discussed plan for discharge, as there is no emergent indication for admission.  Pt/family is agreeable and understands need for follow up and repeat testing.  Pt is aware that discharge does not mean that nothing is wrong but it indicates no emergency is present and they must continue care with follow-up as given below or physician of their choice.     FOLLOW-UP  CORONA Valle  [address] [id number] [phone]    In 2 days  If symptoms worsen         Medication List      New Prescriptions          ondansetron 4 MG tablet   Commonly known as:  ZOFRAN   Take 1 tablet by  mouth Every 6 (Six) Hours As Needed for Nausea or   Vomiting.       phenazopyridine 200 MG tablet   Commonly known as:  PYRIDIUM   Take 1 tablet by mouth 3 (Three) Times a Day As Needed for bladder spasms.         sulfamethoxazole-trimethoprim 800-160 MG per tablet   Commonly known as:  BACTRIM DS,SEPTRA DS   Take 1 tablet by mouth 2 (Two) Times a Day.         Changed          pantoprazole 40 MG EC tablet   Commonly known as:  PROTONIX   Take 1 tablet by mouth Daily.   What changed:    - when to take this  - reasons to take this         Stop          Loratadine 10 MG capsule             Latest Documented Vital Signs:  As of 8:19 PM  BP- 134/81 HR- 87 Temp- 98.4 °F (36.9 °C) (Tympanic) O2 sat- 98%    --  Documentation assistance provided by jaquelin Desir for Dr. William.  Information recorded by the scribe was done at my direction and has been verified and validated by me.            Meghan Desir  06/18/17 2008       Rj William MD  06/18/17 2019

## 2017-06-20 LAB — BACTERIA SPEC AEROBE CULT: ABNORMAL

## 2018-02-12 RX ORDER — PANTOPRAZOLE SODIUM 40 MG/1
40 TABLET, DELAYED RELEASE ORAL DAILY
Qty: 30 TABLET | Refills: 0 | Status: SHIPPED | OUTPATIENT
Start: 2018-02-12 | End: 2018-03-06 | Stop reason: SDUPTHER

## 2018-03-06 ENCOUNTER — OFFICE VISIT (OUTPATIENT)
Dept: GASTROENTEROLOGY | Facility: CLINIC | Age: 23
End: 2018-03-06

## 2018-03-06 VITALS
SYSTOLIC BLOOD PRESSURE: 112 MMHG | WEIGHT: 202.2 LBS | BODY MASS INDEX: 33.69 KG/M2 | TEMPERATURE: 98 F | DIASTOLIC BLOOD PRESSURE: 70 MMHG | HEIGHT: 65 IN

## 2018-03-06 DIAGNOSIS — R11.0 NAUSEA: ICD-10-CM

## 2018-03-06 DIAGNOSIS — K21.9 GASTROESOPHAGEAL REFLUX DISEASE, ESOPHAGITIS PRESENCE NOT SPECIFIED: Primary | ICD-10-CM

## 2018-03-06 DIAGNOSIS — Z86.19 HISTORY OF HELICOBACTER PYLORI INFECTION: ICD-10-CM

## 2018-03-06 DIAGNOSIS — K58.2 IRRITABLE BOWEL SYNDROME WITH BOTH CONSTIPATION AND DIARRHEA: ICD-10-CM

## 2018-03-06 PROCEDURE — 99214 OFFICE O/P EST MOD 30 MIN: CPT | Performed by: NURSE PRACTITIONER

## 2018-03-06 RX ORDER — PANTOPRAZOLE SODIUM 40 MG/1
40 TABLET, DELAYED RELEASE ORAL DAILY
Qty: 30 TABLET | Refills: 11 | Status: SHIPPED | OUTPATIENT
Start: 2018-03-06 | End: 2018-06-19

## 2018-03-06 RX ORDER — MONTELUKAST SODIUM 10 MG/1
TABLET ORAL
Refills: 7 | COMMUNITY
Start: 2018-02-09 | End: 2020-02-26

## 2018-03-06 RX ORDER — RANITIDINE 150 MG/1
150 CAPSULE ORAL
Qty: 30 CAPSULE | Refills: 11 | Status: SHIPPED | OUTPATIENT
Start: 2018-03-06 | End: 2019-03-06

## 2018-03-06 RX ORDER — CLINDAMYCIN HYDROCHLORIDE 300 MG/1
CAPSULE ORAL
Refills: 0 | COMMUNITY
Start: 2018-02-28 | End: 2018-04-17

## 2018-03-06 NOTE — PATIENT INSTRUCTIONS
Continue the protonix daily in AM  Start the Zantac (ranitidine 150 mg) after dinner before bedtime  Start a daily probiotic over the counter (culturelle, align, florastor, hogue) whatever brand is on sale and has more than 5 types of bacteria in it

## 2018-03-06 NOTE — PROGRESS NOTES
Chief Complaint   Patient presents with   • Heartburn   • Difficulty Swallowing   • Nausea       Cesia Catalan is a  22 y.o. female here for a follow up visit for nausea, abdominal pain and GERD.     HPI  21 yo f presents today for follow up for nausea, abdominal pain and GERD. She is a patient of Dr. Arredondo. She has hx of nausea with GERD and has been taking Protonix 40 mg daily in AM and admits she is still having breakthrough reflux symptoms at night. She has been taking TUMS at night without much relief. She does admit the nausea is much better on the Protonix. She will still have generalized abd pain that comes and goes. Sometimes its RUQ and sometimes its LLQ. It does seem to be worse when she is having diarrhea or feeling constipated. Lately she has been more bloated, gassy and having loose stools. She has been taking fiber OTC and that helps some. Occasionally she will wipe after a BM and see bright red blood on the toilet paper. She admits this happens maybe once a month when she is constipated and having to strain to have a BM. She denies any rectal bleeding today or melena. Abd CT scan and gallbladder US done last year were both negative. Bentyl was helping some last year with the loose stools and cramping but she doesn't feel like it is helping much anymore. She has gained weight recently and does feel the weight gain has made her reflux and IBS worse. She is trying to lose weight. She does have hx of H-pylori infection while in high school. She does wonder if maybe that has came back. She has tried cut out all soda and now she drinks green tea. She has never had EGD or colonoscopy in the past. She denies any FH of colon polyps or colon cancer. She does have FH IBD. She denies any dysphagia or vomiting. She reports a good appetite. She admits stress makes her GI symptoms worse too.   Past Medical History:   Diagnosis Date   • Asthma    • Environmental allergies    • GERD (gastroesophageal reflux  disease)        Past Surgical History:   Procedure Laterality Date   • CYST REMOVAL     • NASAL SEPTUM SURGERY         Scheduled Meds:    Continuous Infusions:  No current facility-administered medications for this visit.     PRN Meds:.    Allergies   Allergen Reactions   • Morphine Nausea And Vomiting   • Penicillins Rash       Social History     Social History   • Marital status: Single     Spouse name: N/A   • Number of children: N/A   • Years of education: N/A     Occupational History   • Not on file.     Social History Main Topics   • Smoking status: Former Smoker     Packs/day: 0.25     Types: Cigarettes     Quit date: 2016   • Smokeless tobacco: Never Used   • Alcohol use Yes      Comment: social   • Drug use: No   • Sexual activity: Defer     Other Topics Concern   • Not on file     Social History Narrative       Family History   Problem Relation Age of Onset   • Irritable bowel syndrome Maternal Grandmother    • Crohn's disease Brother        Review of Systems   Constitutional: Negative for appetite change, chills, diaphoresis, fatigue, fever and unexpected weight change.   HENT: Negative for nosebleeds, postnasal drip, sore throat, trouble swallowing and voice change.    Respiratory: Negative for cough, choking, chest tightness, shortness of breath and wheezing.    Cardiovascular: Negative for chest pain.   Gastrointestinal: Positive for abdominal distention, diarrhea and nausea. Negative for abdominal pain, anal bleeding, blood in stool, constipation, rectal pain and vomiting.   Endocrine: Negative for polydipsia, polyphagia and polyuria.   Musculoskeletal: Negative for gait problem.   Skin: Negative for rash and wound.   Allergic/Immunologic: Negative for food allergies.   Neurological: Negative for dizziness, speech difficulty and light-headedness.   Psychiatric/Behavioral: Negative for confusion, self-injury, sleep disturbance and suicidal ideas.       Vitals:    03/06/18 1400   BP: 112/70   Temp: 98  °F (36.7 °C)       Physical Exam   Constitutional: She is oriented to person, place, and time. She appears well-developed and well-nourished. She does not appear ill. No distress.   HENT:   Head: Normocephalic.   Eyes: Pupils are equal, round, and reactive to light.   Cardiovascular: Normal rate, regular rhythm and normal heart sounds.    Pulmonary/Chest: Effort normal and breath sounds normal.   Abdominal: Soft. Bowel sounds are normal. She exhibits no distension and no mass. There is no hepatosplenomegaly. There is no tenderness. There is no rebound and no guarding. No hernia.   Musculoskeletal: Normal range of motion.   Neurological: She is alert and oriented to person, place, and time.   Skin: Skin is warm and dry.   Psychiatric: She has a normal mood and affect. Her speech is normal and behavior is normal. Judgment normal.       No images are attached to the encounter.    Assessment & Plan    1. Gastroesophageal reflux disease, esophagitis presence not specified    2. Nausea    3. Irritable bowel syndrome with both constipation and diarrhea    4. History of Helicobacter pylori infection    GERD and Nausea are not well controlled currently on the Protonix. Since she is doing ok during the day but having trouble after dinner and at night I will add Zantac in the evening to see if that helps. She is to continue the Protonix 40 mg daily in AM. Sounds like she has IBS-mixed with the back and forth between constipation and diarrhea. I want her to get on a daily probiotic and continue to limit her caffeine and drink more water. I do worry she might have a re-occurrence of H-pylori but she does not want to come off the Protonix long enough for me to do a breath test. We may consider EGD/Colonoscopy if her symptoms are not improved in 3 months. She is planning on losing more weight because she can tell a big difference in her reflux and nausea when she weighs less. She has gained >25 lbs since her last visit. She can  continue to use MIRALAX as needed. She can stop the bentyl if its not helping anymore. I will have her follow up with Dr. Arredondo in 3 months. I advised her to call the office in a few weeks and give us an update on the new medications.

## 2018-03-14 ENCOUNTER — TELEPHONE (OUTPATIENT)
Dept: OBSTETRICS AND GYNECOLOGY | Age: 23
End: 2018-03-14

## 2018-03-14 NOTE — TELEPHONE ENCOUNTER
Pt was suppose to start period on Monday and put a tampon in to prepare. Pt states she didn't start and all that was there was a black spot. Pt states today when going to bathroom she had a small black clot come out. Pt has not taken a pregnancy test due to being on B/C.

## 2018-04-05 ENCOUNTER — TELEPHONE (OUTPATIENT)
Dept: GASTROENTEROLOGY | Facility: CLINIC | Age: 23
End: 2018-04-05

## 2018-04-05 NOTE — TELEPHONE ENCOUNTER
Called pt back. Pt states she saw Marisa on 3/6 and started some new meds after that. She states she is not feeling much better. She states she has woken up every night for the last week choking on her acid reflux. She states she is also having some severe lower abdominal pain today as well. She had diarrhea this AM, but before that, her bowels had become more regular. She has been dealing with nausea for the last two weeks. She takes Zofran for that; it work s for about an hour then wears off. She has not had any vomiting. She is still taking pantoprazole every AM and zantac before bedtime. She's taking a daily probiotic. Advised will update Marisa and call back with recommendations. Pt verb understanding.

## 2018-04-05 NOTE — TELEPHONE ENCOUNTER
Called pt and advised that Marisa recommends to change from pantoprazole to Dexilant every AM and continue with Zantac at bedtime. Advised can leave samples at the  for her if she would like. She soul try this regimen for one to two weeks and call back with an update. Pt verb understanding and would like samples. Advised will leave some at the  and she can pick them up 8AM-5PM. Pt verb understanding.   Samples left at .

## 2018-04-05 NOTE — TELEPHONE ENCOUNTER
I would have her try some dexilant 60 mg daily along with the Zantac at bedtime and see how that works for her. You can give her some samples to try. Have her call us back in 1-2 weeks with update. Thanks.

## 2018-04-05 NOTE — TELEPHONE ENCOUNTER
----- Message from Uriah Mir sent at 4/5/2018 10:52 AM EDT -----  Regarding: not feeling well   Contact: [phone]  Pt called of lower stomach pain and gi problems

## 2018-04-17 ENCOUNTER — OFFICE VISIT (OUTPATIENT)
Dept: OBSTETRICS AND GYNECOLOGY | Age: 23
End: 2018-04-17

## 2018-04-17 VITALS
HEIGHT: 65 IN | SYSTOLIC BLOOD PRESSURE: 128 MMHG | BODY MASS INDEX: 33.64 KG/M2 | WEIGHT: 201.9 LBS | DIASTOLIC BLOOD PRESSURE: 68 MMHG

## 2018-04-17 DIAGNOSIS — Z11.51 SPECIAL SCREENING EXAMINATION FOR HUMAN PAPILLOMAVIRUS (HPV): ICD-10-CM

## 2018-04-17 DIAGNOSIS — Z12.4 ROUTINE CERVICAL SMEAR: ICD-10-CM

## 2018-04-17 DIAGNOSIS — Z00.00 ANNUAL PHYSICAL EXAM: Primary | ICD-10-CM

## 2018-04-17 PROCEDURE — 99395 PREV VISIT EST AGE 18-39: CPT | Performed by: OBSTETRICS & GYNECOLOGY

## 2018-04-17 RX ORDER — BACLOFEN 10 MG/1
TABLET ORAL
Refills: 2 | COMMUNITY
Start: 2018-03-13 | End: 2020-02-26

## 2018-04-17 RX ORDER — OFLOXACIN 3 MG/ML
SOLUTION AURICULAR (OTIC)
Refills: 0 | COMMUNITY
Start: 2018-01-24 | End: 2018-04-30

## 2018-04-17 NOTE — PROGRESS NOTES
Carmen Catalan is a 22 y.o. female is being seen today for an annual exam.  Chief Complaint   Patient presents with   • Gynecologic Exam     Last Pap 04/11/2017 Ascus Neg HPV   .    History of Present Illness    The following portions of the patient's history were reviewed and updated as appropriate: allergies, current medications, past family history, past medical history, past social history, past surgical history and problem list.    Vitals:    04/17/18 1358   BP: 128/68       PAST MEDICAL HISTORY  Past Medical History:   Diagnosis Date   • Asthma    • Environmental allergies    • GERD (gastroesophageal reflux disease)      OB History     No data available        Past Surgical History:   Procedure Laterality Date   • CYST REMOVAL     • NASAL SEPTUM SURGERY       Family History   Problem Relation Age of Onset   • Irritable bowel syndrome Maternal Grandmother    • Crohn's disease Brother      History   Smoking Status   • Former Smoker   • Packs/day: 0.25   • Types: Cigarettes   • Quit date: 2016   Smokeless Tobacco   • Never Used       Current Outpatient Prescriptions:   •  baclofen (LIORESAL) 10 MG tablet, TK 1 TO 2 TS PO QHS, Disp: , Rfl: 2  •  cetirizine (zyrTEC) 10 MG tablet, Take 10 mg by mouth Daily., Disp: , Rfl:   •  clindamycin (CLEOCIN) 300 MG capsule, TK 1 C PO TID FOR 7 DAYS, Disp: , Rfl: 0  •  levalbuterol (XOPENEX HFA) 45 MCG/ACT inhaler, Inhale 2 puffs Every 4 (Four) Hours As Needed for Wheezing or Shortness of Air., Disp: , Rfl:   •  Loratadine 10 MG capsule, Take  by mouth., Disp: , Rfl:   •  montelukast (SINGULAIR) 10 MG tablet, TK 1 T PO HS, Disp: , Rfl: 7  •  Multiple Vitamin (MULTI VITAMIN PO), Take 1 tablet by mouth Daily., Disp: , Rfl:   •  ofloxacin (FLOXIN) 0.3 % otic solution, , Disp: , Rfl: 0  •  pantoprazole (PROTONIX) 40 MG EC tablet, Take 1 tablet by mouth Daily. Please make an appointment for further refills. Thank you., Disp: 30 tablet, Rfl: 11  •  PROAIR  (90  Base) MCG/ACT inhaler, INHALE 2 PUFFS INTO THE LUNGS Q 6 H, Disp: , Rfl: 4  •  ranitidine (ZANTAC) 150 MG capsule, Take 1 capsule by mouth every night at bedtime., Disp: 30 capsule, Rfl: 11  •  Unable to find, Inject 1 each under the skin 3 (Three) Times a Week. Med Name: Allergy shots, Disp: , Rfl:     There is no immunization history on file for this patient.    Review of Systems    Objective   Physical Exam      Assessment/Plan   There are no diagnoses linked to this encounter.

## 2018-04-17 NOTE — PROGRESS NOTES
Subjective   Cesia Catalan is a 22 y.o. female is being seen today for   Chief Complaint   Patient presents with   • Gynecologic Exam     Last Pap 04/11/2017 Asc[facility] Neg HPV   .    History of Present Illness  Patient is here for annual check.  Overall she doing well and is still with the same significant other in fact she thinks he may propose very soon.  She would like to go off the pill to see how she feels.  They're not sexually active right now all and he might wait until marriage.  Before she have take the pill she was fairly regular not too many cramps but did feel ovulation.  And she knows about using other types of birth control in the meantime.  No other complaints no new family history bowels and bladder work well  The following portions of the patient's history were reviewed and updated as appropriate: allergies, current medications, past family history, past medical history, past social history, past surgical history and problem list.    Vitals:    04/17/18 1358   BP: 128/68       PAST MEDICAL HISTORY  Past Medical History:   Diagnosis Date   • Asthma    • Environmental allergies    • GERD (gastroesophageal reflux disease)      OB History     No data available        Past Surgical History:   Procedure Laterality Date   • CYST REMOVAL     • NASAL SEPTUM SURGERY       Family History   Problem Relation Age of Onset   • Irritable bowel syndrome Maternal Grandmother    • Crohn's disease Brother      History   Smoking Status   • Former Smoker   • Packs/day: 0.25   • Types: Cigarettes   • Quit date: 2016   Smokeless Tobacco   • Never Used       Current Outpatient Prescriptions:   •  baclofen (LIORESAL) 10 MG tablet, TK 1 TO 2 TS PO QHS, Disp: , Rfl: 2  •  cetirizine (zyrTEC) 10 MG tablet, Take 10 mg by mouth Daily., Disp: , Rfl:   •  levalbuterol (XOPENEX HFA) 45 MCG/ACT inhaler, Inhale 2 puffs Every 4 (Four) Hours As Needed for Wheezing or Shortness of Air., Disp: , Rfl:   •  Loratadine 10 MG capsule, Take  by  mouth., Disp: , Rfl:   •  montelukast (SINGULAIR) 10 MG tablet, TK 1 T PO HS, Disp: , Rfl: 7  •  Multiple Vitamin (MULTI VITAMIN PO), Take 1 tablet by mouth Daily., Disp: , Rfl:   •  ofloxacin (FLOXIN) 0.3 % otic solution, , Disp: , Rfl: 0  •  pantoprazole (PROTONIX) 40 MG EC tablet, Take 1 tablet by mouth Daily. Please make an appointment for further refills. Thank you., Disp: 30 tablet, Rfl: 11  •  PROAIR  (90 Base) MCG/ACT inhaler, INHALE 2 PUFFS INTO THE LUNGS Q 6 H, Disp: , Rfl: 4  •  ranitidine (ZANTAC) 150 MG capsule, Take 1 capsule by mouth every night at bedtime., Disp: 30 capsule, Rfl: 11  •  Unable to find, Inject 1 each under the skin 3 (Three) Times a Week. Med Name: Allergy shots, Disp: , Rfl:     There is no immunization history on file for this patient.    Review of Systems   Constitutional: Negative for chills, fatigue, fever and unexpected weight change.   Respiratory: Negative for shortness of breath and wheezing.    Cardiovascular: Negative for chest pain.   Gastrointestinal: Positive for abdominal pain, blood in stool and diarrhea. Negative for abdominal distention, constipation and nausea.   Genitourinary: Negative for difficulty urinating, dyspareunia, dysuria, frequency, hematuria, menstrual problem, pelvic pain, urgency and vaginal discharge.   Skin: Negative for rash.       Objective   Physical Exam   Constitutional: She is oriented to person, place, and time. Vital signs are normal. She appears well-developed and well-nourished.   Neck: No thyromegaly present.   Cardiovascular: Normal rate, regular rhythm and normal heart sounds.    Pulmonary/Chest: Effort normal. Right breast exhibits no inverted nipple, no mass, no nipple discharge, no skin change and no tenderness. Left breast exhibits no inverted nipple, no mass, no nipple discharge, no skin change and no tenderness. Breasts are symmetrical. There is no breast swelling.   Abdominal: Soft.   Genitourinary: Vagina normal and  uterus normal. No breast tenderness, discharge or bleeding. Pelvic exam was performed with patient supine. No labial fusion. There is no rash, tenderness, lesion or injury on the right labia. There is no rash, tenderness, lesion or injury on the left labia. Cervix exhibits no motion tenderness, no discharge and no friability. Right adnexum displays no mass, no tenderness and no fullness. Left adnexum displays no mass, no tenderness and no fullness.   Neurological: She is alert and oriented to person, place, and time.   Psychiatric: She has a normal mood and affect.   Vitals reviewed.        Assessment/Plan   Cesia was seen today for gynecologic exam.    Diagnoses and all orders for this visit:    Annual physical exam    Routine cervical smear  -     IGP, Apt HPV,rfx 16 / 18,45 - ThinPrep Vial, Cervix    Special screening examination for human papillomavirus (HPV)  -     IGP, Apt HPV,rfx 16 / 18,45 - ThinPrep Vial, Cervix        My exam is normal today.  Pap smear done today.  We talked about her GI problems which is here main problem today.  She's had IBS she is seen GI and she will probably end up with a colonoscopy and an EGD.  I did talk about weight and exercise especially before the pregnancy

## 2018-04-19 LAB
CYTOLOGIST CVX/VAG CYTO: NORMAL
CYTOLOGY CVX/VAG DOC THIN PREP: NORMAL
DX ICD CODE: NORMAL
HIV 1 & 2 AB SER-IMP: NORMAL
HPV I/H RISK 4 DNA CVX QL PROBE+SIG AMP: NEGATIVE
Lab: NORMAL
OTHER STN SPEC: NORMAL
PATH REPORT.FINAL DX SPEC: NORMAL
STAT OF ADQ CVX/VAG CYTO-IMP: NORMAL

## 2018-06-19 ENCOUNTER — OFFICE VISIT (OUTPATIENT)
Dept: GASTROENTEROLOGY | Facility: CLINIC | Age: 23
End: 2018-06-19

## 2018-06-19 VITALS
SYSTOLIC BLOOD PRESSURE: 112 MMHG | HEIGHT: 65 IN | TEMPERATURE: 98.5 F | WEIGHT: 196.8 LBS | BODY MASS INDEX: 32.79 KG/M2 | DIASTOLIC BLOOD PRESSURE: 70 MMHG

## 2018-06-19 DIAGNOSIS — K21.9 GASTROESOPHAGEAL REFLUX DISEASE, ESOPHAGITIS PRESENCE NOT SPECIFIED: Primary | ICD-10-CM

## 2018-06-19 DIAGNOSIS — R11.0 NAUSEA: ICD-10-CM

## 2018-06-19 PROCEDURE — 99212 OFFICE O/P EST SF 10 MIN: CPT | Performed by: INTERNAL MEDICINE

## 2018-06-19 NOTE — PROGRESS NOTES
Chief Complaint   Patient presents with   • Follow-up   • Heartburn   • Nausea     Subjective      HPI     Cesia Catalan is a 22 y.o. female who presents today for f/u.  Overall doing great.  Started Her[employer] for last two months.  Mostly chicken, fish, and veggies.  Has taken only rare zantac for hearburn. No nausea. Has lost a few lbs.  Has more energy.      Past Medical History:   Diagnosis Date   • Asthma    • Environmental allergies    • GERD (gastroesophageal reflux disease)        Current Outpatient Prescriptions:   •  baclofen (LIORESAL) 10 MG tablet, TK 1 TO 2 TS PO QHS, Disp: , Rfl: 2  •  cetirizine (zyrTEC) 10 MG tablet, Take 10 mg by mouth Daily., Disp: , Rfl:   •  levalbuterol (XOPENEX HFA) 45 MCG/ACT inhaler, Inhale 2 puffs Every 4 (Four) Hours As Needed for Wheezing or Shortness of Air., Disp: , Rfl:   •  montelukast (SINGULAIR) 10 MG tablet, TK 1 T PO HS, Disp: , Rfl: 7  •  Multiple Vitamin (MULTI VITAMIN PO), Take 1 tablet by mouth Daily., Disp: , Rfl:   •  PROAIR  (90 Base) MCG/ACT inhaler, INHALE 2 PUFFS INTO THE LUNGS Q 6 H, Disp: , Rfl: 4  •  Unable to find, Inject 1 each under the skin 3 (Three) Times a Week. Med Name: Allergy shots, Disp: , Rfl:   •  ranitidine (ZANTAC) 150 MG capsule, Take 1 capsule by mouth every night at bedtime., Disp: 30 capsule, Rfl: 11     Allergies   Allergen Reactions   • Morphine Nausea And Vomiting   • Penicillins Rash     Social History     Social History   • Marital status: Single     Spouse name: N/A   • Number of children: N/A   • Years of education: N/A     Occupational History   • Not on file.     Social History Main Topics   • Smoking status: Former Smoker     Packs/day: 0.25     Types: Cigarettes     Quit date: 2016   • Smokeless tobacco: Never Used   • Alcohol use Yes      Comment: social   • Drug use: No   • Sexual activity: Defer     Other Topics Concern   • Not on file     Social History Narrative   • No narrative on file     Family History    Problem Relation Age of Onset   • Irritable bowel syndrome Maternal Grandmother    • Crohn's disease Brother      Review of Systems   Constitutional: Negative.    Gastrointestinal: Negative.         Objective   Vitals:    06/19/18 1344   BP: 112/70   Temp: 98.5 °F (36.9 °C)     Physical Exam   Constitutional: She is oriented to person, place, and time. She appears well-developed and well-nourished.   HENT:   Head: Normocephalic and atraumatic.   Abdominal: Soft. Bowel sounds are normal. She exhibits no distension and no mass. There is no tenderness. No hernia.   Neurological: She is alert and oriented to person, place, and time.   Skin: Skin is warm and dry.   Psychiatric: She has a normal mood and affect. Her behavior is normal. Judgment and thought content normal.   Vitals reviewed.       Assessment/Plan   Assessment:     1. Gastroesophageal reflux disease, esophagitis presence not specified    2. Nausea      Plan:   Overall Patient has had an excellent response to dietary modification and therapeutic lifestyle adjustment.  She should continue with the same for now.  Would not propose any further GI workup at this time given her significant improvement but should she have return of symptoms and we would need to discuss proceeding with endoscopic evaluation at that point.    Follow up 6mos          Jayy Arredondo M.D.  [facility]  [address] [place] [address]  Office: [phone]

## 2019-04-30 ENCOUNTER — OFFICE VISIT (OUTPATIENT)
Dept: OBSTETRICS AND GYNECOLOGY | Age: 24
End: 2019-04-30

## 2019-04-30 VITALS
HEIGHT: 65 IN | DIASTOLIC BLOOD PRESSURE: 70 MMHG | WEIGHT: 216.4 LBS | BODY MASS INDEX: 36.06 KG/M2 | SYSTOLIC BLOOD PRESSURE: 120 MMHG

## 2019-04-30 DIAGNOSIS — Z00.00 ANNUAL PHYSICAL EXAM: Primary | ICD-10-CM

## 2019-04-30 PROCEDURE — 99395 PREV VISIT EST AGE 18-39: CPT | Performed by: OBSTETRICS & GYNECOLOGY

## 2019-04-30 RX ORDER — ONDANSETRON 8 MG/1
TABLET, ORALLY DISINTEGRATING ORAL
Refills: 1 | COMMUNITY
Start: 2019-04-25 | End: 2020-09-23 | Stop reason: ALTCHOICE

## 2019-04-30 NOTE — PROGRESS NOTES
Subjective   Cesia Catalan is a 23 y.o. female is being seen today for   Chief Complaint   Patient presents with   • Gynecologic Exam     annual exam-last pap 4/17/18 (neg)   .    History of Present Illness  Patient is here for her annual check.  She is in great.  Interestingly is at home her father she was to forgot to tell me why she did have breast cancer so a total bit about BRCA testing and she is going home and talk to them to see if he ever had that done she does not think so.  Nothing else in the family she does have a few migraines slowly get a bit worse and they are working on that.  Try to lose weight.  They also find bowels bladder work well and she still whether same significant other not quite engaged yet.  The following portions of the patient's history were reviewed and updated as appropriate: allergies, current medications, past family history, past medical history, past social history, past surgical history and problem list.    Vitals:    04/30/19 1350   BP: 120/70       PAST MEDICAL HISTORY  Past Medical History:   Diagnosis Date   • Asthma    • Environmental allergies    • GERD (gastroesophageal reflux disease)      OB History     No data available        Past Surgical History:   Procedure Laterality Date   • CYST REMOVAL     • NASAL SEPTUM SURGERY       Family History   Problem Relation Age of Onset   • Irritable bowel syndrome Maternal Grandmother    • Crohn's disease Brother      Social History     Tobacco Use   Smoking Status Former Smoker   • Packs/day: 0.25   • Types: Cigarettes   • Last attempt to quit: 2016   • Years since quitting: 3.3   Smokeless Tobacco Never Used       Current Outpatient Medications:   •  baclofen (LIORESAL) 10 MG tablet, TK 1 TO 2 TS PO QHS, Disp: , Rfl: 2  •  cetirizine (zyrTEC) 10 MG tablet, Take 10 mg by mouth Daily., Disp: , Rfl:   •  levalbuterol (XOPENEX HFA) 45 MCG/ACT inhaler, Inhale 2 puffs Every 4 (Four) Hours As Needed for Wheezing or Shortness of Air.,  Disp: , Rfl:   •  montelukast (SINGULAIR) 10 MG tablet, TK 1 T PO HS, Disp: , Rfl: 7  •  ondansetron ODT (ZOFRAN-ODT) 8 MG disintegrating tablet, DIS 1 T ON THE TONGUE Q 6 H PRF NAUSEA, Disp: , Rfl: 1  •  PROAIR  (90 Base) MCG/ACT inhaler, INHALE 2 PUFFS INTO THE LUNGS Q 6 H, Disp: , Rfl: 4  •  Unable to find, Inject 1 each under the skin 3 (Three) Times a Week. Med Name: Allergy shots, Disp: , Rfl:   •  Multiple Vitamin (MULTI VITAMIN PO), Take 1 tablet by mouth Daily., Disp: , Rfl:     There is no immunization history on file for this patient.    Review of Systems   Constitutional: Negative for chills, fatigue, fever and unexpected weight change.   Respiratory: Negative for shortness of breath and wheezing.    Cardiovascular: Negative for chest pain.   Gastrointestinal: Negative for abdominal distention, abdominal pain, blood in stool, constipation, diarrhea and nausea.   Genitourinary: Negative for difficulty urinating, dyspareunia, dysuria, frequency, hematuria, menstrual problem, pelvic pain, urgency and vaginal discharge.   Skin: Negative for rash.   Neurological: Positive for headaches.       Objective   Physical Exam   Constitutional: She is oriented to person, place, and time. Vital signs are normal. She appears well-developed and well-nourished.   Neck: No thyromegaly present.   Cardiovascular: Normal rate, regular rhythm and normal heart sounds.   Pulmonary/Chest: Effort normal. Right breast exhibits no inverted nipple, no mass, no nipple discharge, no skin change and no tenderness. Left breast exhibits no inverted nipple, no mass, no nipple discharge, no skin change and no tenderness. Breasts are symmetrical. There is no breast swelling.   Abdominal: Soft.   Genitourinary: Vagina normal and uterus normal. No breast tenderness, discharge or bleeding. Pelvic exam was performed with patient supine. No labial fusion. There is no rash, tenderness, lesion or injury on the right labia. There is no rash,  tenderness, lesion or injury on the left labia. Cervix exhibits no motion tenderness, no discharge and no friability. Right adnexum displays no mass, no tenderness and no fullness. Left adnexum displays no mass, no tenderness and no fullness.   Neurological: She is alert and oriented to person, place, and time.   Psychiatric: She has a normal mood and affect.   Vitals reviewed.        Assessment/Plan   Cesia was seen today for gynecologic exam.    Diagnoses and all orders for this visit:    Annual physical exam      Normal exam today.  No Pap done today was done last year.  Talked about exercise and eating a diet.  Her menses are regular no problems there call for any birth control otherwise back in a year

## 2019-05-02 ENCOUNTER — TELEPHONE (OUTPATIENT)
Dept: OBSTETRICS AND GYNECOLOGY | Age: 24
End: 2019-05-02

## 2019-05-02 NOTE — TELEPHONE ENCOUNTER
Regarding: Non-Urgent Medical Question  Contact: [phone]  ----- Message from [employer], Generic sent at 5/2/2019  9:42 AM EDT -----    Good morning Dr. Beaver.  I have talked with my Father and he will not get the genetic testing done. The rest of my family know he will not change his mind. However I would like to get the test done. How do I go about getting that set up?   Thank you for education me on this subject.   hao mabry

## 2019-05-04 NOTE — TELEPHONE ENCOUNTER
"Subjective:       Patient ID: Nicolle Gresham is a 41 y.o. female.    Vitals:  height is 5' 3" (1.6 m) and weight is 97.5 kg (215 lb). Her oral temperature is 97.8 °F (36.6 °C). Her blood pressure is 126/78 and her pulse is 80. Her respiration is 12 and oxygen saturation is 98%.     Chief Complaint: Cough    Cough   This is a new problem. The current episode started in the past 7 days. The problem has been gradually worsening. The cough is productive of sputum. Associated symptoms include ear pain and nasal congestion. Pertinent negatives include no chest pain, chills, eye redness, fever, headaches, myalgias, postnasal drip, sore throat, shortness of breath or wheezing. She has tried OTC cough suppressant for the symptoms. The treatment provided mild relief. There is no history of asthma, bronchitis or pneumonia.     Review of Systems   Constitution: Negative for chills, fever and malaise/fatigue.   HENT: Positive for ear pain. Negative for congestion, hoarse voice, postnasal drip and sore throat.    Eyes: Negative for discharge and redness.   Cardiovascular: Negative for chest pain, dyspnea on exertion and leg swelling.   Respiratory: Positive for cough and sputum production. Negative for shortness of breath and wheezing.    Musculoskeletal: Negative for myalgias.   Gastrointestinal: Negative for abdominal pain and nausea.   Neurological: Negative for headaches.       Objective:      Physical Exam   Constitutional: She is oriented to person, place, and time. She appears well-developed and well-nourished. She is cooperative.  Non-toxic appearance. She does not appear ill. No distress.   HENT:   Head: Normocephalic and atraumatic.   Right Ear: Hearing, tympanic membrane, external ear and ear canal normal.   Left Ear: Hearing, tympanic membrane, external ear and ear canal normal.   Nose: Nose normal. No mucosal edema, rhinorrhea or nasal deformity. No epistaxis. Right sinus exhibits no maxillary sinus tenderness and no " Tell patient she can come in anytime to get the blood drawn and fill out the information please have her call first to make sure mary is here   frontal sinus tenderness. Left sinus exhibits no maxillary sinus tenderness and no frontal sinus tenderness.   Mouth/Throat: Uvula is midline and mucous membranes are normal. No trismus in the jaw. Normal dentition. No uvula swelling. Posterior oropharyngeal edema present. No oropharyngeal exudate or posterior oropharyngeal erythema.   Eyes: Conjunctivae and lids are normal. No scleral icterus.   Sclera clear bilat   Neck: Trachea normal, full passive range of motion without pain and phonation normal. Neck supple.   Cardiovascular: Normal rate, regular rhythm, normal heart sounds, intact distal pulses and normal pulses.    Pulmonary/Chest: Effort normal. No respiratory distress. She has no decreased breath sounds. She has no wheezes. She has rhonchi in the right upper field and the left upper field. She has no rales.   Rhonchi clear with cough.  No wheezes noted.  Patient is moving air well   Abdominal: Soft. Normal appearance and bowel sounds are normal. She exhibits no distension. There is no tenderness.   Musculoskeletal: Normal range of motion. She exhibits no edema or deformity.   Neurological: She is alert and oriented to person, place, and time. She exhibits normal muscle tone. Coordination normal.   Skin: Skin is warm, dry and intact. She is not diaphoretic. No pallor.   Psychiatric: She has a normal mood and affect. Her speech is normal and behavior is normal. Judgment and thought content normal. Cognition and memory are normal.   Nursing note and vitals reviewed.      Assessment:       1. Bronchitis        Plan:         Bronchitis  -     betamethasone acetate-betamethasone sodium phosphate injection 6 mg; Inject 1 mL (6 mg total) into the muscle one time.  -     azithromycin (ZITHROMAX Z-BUTCH) 250 MG tablet; Take 2 tablets (500 mg) on  Day 1,  followed by 1 tablet (250 mg) once daily on Days 2 through 5.  Dispense: 6 tablet; Refill: 0      Follow Up Comments   Make sure that you follow up with your primary  care doctor in the next 2-5 days if needed .  Return to the Urgent Care if signs or symptoms change and certainly if you have worsening symptoms go to the nearest emergency department for further evaluation.

## 2019-05-07 NOTE — TELEPHONE ENCOUNTER
Patient advised.  Will have Dinah call her once she gets back into office.  Questioned if test is covered by insurance and advised company usually calls prior to running.

## 2019-05-16 NOTE — TELEPHONE ENCOUNTER
She is going to come in on Tuesday for genetic testing.  Also, she wanted to know if her brother would be able to test her brother as well??  She is aware you are out of the office until next week.

## 2019-05-23 NOTE — TELEPHONE ENCOUNTER
Tell her her brother would have to go to his physician to get tested since he is not a patient here

## 2020-02-26 ENCOUNTER — OFFICE VISIT (OUTPATIENT)
Dept: OBSTETRICS AND GYNECOLOGY | Age: 25
End: 2020-02-26

## 2020-02-26 VITALS
HEIGHT: 65 IN | SYSTOLIC BLOOD PRESSURE: 118 MMHG | DIASTOLIC BLOOD PRESSURE: 82 MMHG | WEIGHT: 206 LBS | BODY MASS INDEX: 34.32 KG/M2

## 2020-02-26 DIAGNOSIS — Z31.69 PRE-CONCEPTION COUNSELING: Primary | ICD-10-CM

## 2020-02-26 PROBLEM — G43.001 MIGRAINE WITHOUT AURA AND WITH STATUS MIGRAINOSUS, NOT INTRACTABLE: Status: ACTIVE | Noted: 2019-12-17

## 2020-02-26 PROCEDURE — 99213 OFFICE O/P EST LOW 20 MIN: CPT | Performed by: OBSTETRICS & GYNECOLOGY

## 2020-02-26 RX ORDER — VALACYCLOVIR HYDROCHLORIDE 1 G/1
1 TABLET, FILM COATED ORAL
COMMUNITY
Start: 2019-11-13 | End: 2020-02-26

## 2020-02-26 RX ORDER — FOLIC ACID 0.8 MG
TABLET ORAL
Status: ON HOLD | COMMUNITY
End: 2021-02-26

## 2020-02-26 RX ORDER — NARATRIPTAN 2.5 MG/1
TABLET ORAL
Status: ON HOLD | COMMUNITY
Start: 2019-12-17 | End: 2021-02-26

## 2020-02-26 RX ORDER — METOCLOPRAMIDE 10 MG/1
10 TABLET ORAL AS NEEDED
COMMUNITY
Start: 2019-12-17 | End: 2020-09-23

## 2020-02-26 RX ORDER — RANITIDINE 150 MG/1
150 TABLET ORAL
COMMUNITY
End: 2020-02-26

## 2020-02-26 NOTE — PROGRESS NOTES
Subjective   Cesia Ambrose is a 24 y.o. female is being seen today for   Chief Complaint   Patient presents with   • Consult     Consult on conceiving    .    History of Present Illness  Patient is here to discuss her pregnancy counseling.  She got  in October all is good with that.  She is off birth control is sexually active not preventing but not particularly trying at this point.  She also has a cruise coming up in  and is wondering if she should wait till after that and we did discuss that.  I 1 of the past history again father with breast cancer but he will not get genetic testing his father with lung his sister with ovarian cyst with uterine so the patient is high risk and we talked about that again but she is going to get all the information because it could be some other cancers in the ages.  Medically she has migraines which are better, asthma and allergies.  We talked about pets [employer] exercise diet childhood diseases exposure to toxins.  I gave her some samples of prenatals.  The following portions of the patient's history were reviewed and updated as appropriate: allergies, current medications, past family history, past medical history, past social history, past surgical history and problem list.    Vitals:    20 1200   BP: 118/82       PAST MEDICAL HISTORY  Past Medical History:   Diagnosis Date   • Asthma    • Environmental allergies    • GERD (gastroesophageal reflux disease)      OB History    None       Past Surgical History:   Procedure Laterality Date   • CYST REMOVAL     • NASAL SEPTUM SURGERY       Family History   Problem Relation Age of Onset   • Irritable bowel syndrome Maternal Grandmother    • Crohn's disease Brother      Social History     Tobacco Use   Smoking Status Former Smoker   • Packs/day: 0.25   • Types: Cigarettes   • Last attempt to quit:    • Years since quittin.1   Smokeless Tobacco Never Used       Current Outpatient Medications:   •  cetirizine  (zyrTEC) 10 MG tablet, Take 10 mg by mouth Daily., Disp: , Rfl:   •  Co-Enzyme Q-10 30 MG capsule, Take  by mouth., Disp: , Rfl:   •  Magnesium 500 MG capsule, Take  by mouth., Disp: , Rfl:   •  metoclopramide (REGLAN) 10 MG tablet, Take 10 mg by mouth 4 (Four) Times a Day., Disp: , Rfl:   •  naratriptan (AMERGE) 2.5 MG tablet, , not to exceed 5 mg in any 24-hour period., Disp: , Rfl:   •  ondansetron ODT (ZOFRAN-ODT) 8 MG disintegrating tablet, DIS 1 T ON THE TONGUE Q 6 H PRF NAUSEA, Disp: , Rfl: 1  •  PROAIR  (90 Base) MCG/ACT inhaler, INHALE 2 PUFFS INTO THE LUNGS Q 6 H, Disp: , Rfl: 4  •  Unable to find, Inject 1 each under the skin 3 (Three) Times a Week. Med Name: Allergy shots, Disp: , Rfl:     There is no immunization history on file for this patient.    Review of Systems  Negative  Objective   Physical Exam  Well-developed and nourished female no acute distress    Assessment/Plan   Cesia was seen today for consult.    Diagnoses and all orders for this visit:    Pre-conception counseling        See the above discussion all her questions were answered annual check in May

## 2020-03-10 ENCOUNTER — OFFICE VISIT (OUTPATIENT)
Dept: GASTROENTEROLOGY | Facility: CLINIC | Age: 25
End: 2020-03-10

## 2020-03-10 VITALS
SYSTOLIC BLOOD PRESSURE: 120 MMHG | BODY MASS INDEX: 34.66 KG/M2 | DIASTOLIC BLOOD PRESSURE: 78 MMHG | HEIGHT: 65 IN | TEMPERATURE: 98.3 F | WEIGHT: 208 LBS

## 2020-03-10 DIAGNOSIS — K75.81 NASH (NONALCOHOLIC STEATOHEPATITIS): Primary | ICD-10-CM

## 2020-03-10 PROCEDURE — 99212 OFFICE O/P EST SF 10 MIN: CPT | Performed by: INTERNAL MEDICINE

## 2020-03-10 NOTE — PROGRESS NOTES
Chief Complaint   Patient presents with   • Elevated Hepatic Enzymes     Subjective     HPI  Cesia Ambrose is a 24 y.o. female who presents for evaluation of elevated liver enzymes.    Labs from PCP reviewed.  ALT in 50-60 range over last year.  BMI today is 35.  She had GARCÍA fibrosure performed by PCP which showed no fibrosis and mild/moderate steatosis.  RUQ u/s showed diffuse hepatic steatosis.  No EtOH intake.  She reports she has lost about 10# over last month with diet and exercise.  She has no family hx of liver disease.      Past Medical History:   Diagnosis Date   • Asthma    • Environmental allergies    • GERD (gastroesophageal reflux disease)        Social History     Socioeconomic History   • Marital status:      Spouse name: Not on file   • Number of children: Not on file   • Years of education: Not on file   • Highest education level: Not on file   Tobacco Use   • Smoking status: Former Smoker     Packs/day: 0.25     Types: Cigarettes     Last attempt to quit: 2016     Years since quittin.1   • Smokeless tobacco: Never Used   Substance and Sexual Activity   • Alcohol use: Yes     Comment: social   • Drug use: No   • Sexual activity: Defer         Current Outpatient Medications:   •  cetirizine (zyrTEC) 10 MG tablet, Take 10 mg by mouth Daily., Disp: , Rfl:   •  Co-Enzyme Q-10 30 MG capsule, Take  by mouth., Disp: , Rfl:   •  Magnesium 500 MG capsule, Take  by mouth., Disp: , Rfl:   •  metoclopramide (REGLAN) 10 MG tablet, Take 10 mg by mouth As Needed., Disp: , Rfl:   •  naratriptan (AMERGE) 2.5 MG tablet, , not to exceed 5 mg in any 24-hour period., Disp: , Rfl:   •  ondansetron ODT (ZOFRAN-ODT) 8 MG disintegrating tablet, DIS 1 T ON THE TONGUE Q 6 H PRF NAUSEA, Disp: , Rfl: 1  •  PROAIR  (90 Base) MCG/ACT inhaler, INHALE 2 PUFFS INTO THE LUNGS Q 6 H, Disp: , Rfl: 4  •  Unable to find, Inject 1 each under the skin 3 (Three) Times a Week. Med Name: Allergy shots, Disp: , Rfl:     Review  of Systems   Constitutional: Negative.    Gastrointestinal: Negative.        Objective   Vitals:    03/10/20 1524   BP: 120/78   Temp: 98.3 °F (36.8 °C)       Physical Exam   Constitutional: She is oriented to person, place, and time. She appears well-developed and well-nourished.   HENT:   Head: Normocephalic and atraumatic.   Abdominal: Soft. Bowel sounds are normal. She exhibits no distension and no mass. There is no tenderness. No hernia.   Neurological: She is alert and oriented to person, place, and time.   Skin: Skin is warm and dry.   Psychiatric: She has a normal mood and affect. Her behavior is normal. Judgment and thought content normal.   Vitals reviewed.      Assessment/Plan   Assessment:   1.  Elevated liver enzymes  2.  GARCÍA  3.  Obesity with BMI 35    Plan:   RUQ u/s c/w hepatic steatosis, and GARCÍA fibrosure with mild/moderate steatosis but F0 fibrosis  Continue with aggressive dietary modification and exercise regimen with goal of weight loss  Repeat labs in 3 mos, I would not embark on further workup at this time unless she has increase in LFTs despite continued lifestyle modification        Jayy Arredondo M.D.  [facility]  [address] [place] [address]  Office: [phone]

## 2020-06-30 ENCOUNTER — OFFICE VISIT (OUTPATIENT)
Dept: GASTROENTEROLOGY | Facility: CLINIC | Age: 25
End: 2020-06-30

## 2020-06-30 VITALS — TEMPERATURE: 98.7 F | WEIGHT: 208.7 LBS | BODY MASS INDEX: 34.77 KG/M2 | HEIGHT: 65 IN

## 2020-06-30 DIAGNOSIS — K21.9 GASTROESOPHAGEAL REFLUX DISEASE, ESOPHAGITIS PRESENCE NOT SPECIFIED: ICD-10-CM

## 2020-06-30 DIAGNOSIS — E66.9 OBESITY (BMI 30.0-34.9): ICD-10-CM

## 2020-06-30 DIAGNOSIS — K75.81 NASH (NONALCOHOLIC STEATOHEPATITIS): Primary | ICD-10-CM

## 2020-06-30 PROCEDURE — 99214 OFFICE O/P EST MOD 30 MIN: CPT | Performed by: INTERNAL MEDICINE

## 2020-06-30 RX ORDER — FAMOTIDINE 20 MG/1
20 TABLET, FILM COATED ORAL 2 TIMES DAILY
COMMUNITY
End: 2020-06-30

## 2020-06-30 RX ORDER — ESOMEPRAZOLE MAGNESIUM 40 MG/1
40 CAPSULE, DELAYED RELEASE ORAL DAILY
Qty: 90 CAPSULE | Refills: 3 | Status: SHIPPED | OUTPATIENT
Start: 2020-06-30 | End: 2020-07-14

## 2020-07-14 ENCOUNTER — OFFICE VISIT (OUTPATIENT)
Dept: OBSTETRICS AND GYNECOLOGY | Age: 25
End: 2020-07-14

## 2020-07-14 VITALS
DIASTOLIC BLOOD PRESSURE: 64 MMHG | SYSTOLIC BLOOD PRESSURE: 108 MMHG | HEIGHT: 65 IN | BODY MASS INDEX: 34.49 KG/M2 | WEIGHT: 207 LBS

## 2020-07-14 DIAGNOSIS — Z00.00 ENCOUNTER FOR ANNUAL PHYSICAL EXAM: ICD-10-CM

## 2020-07-14 DIAGNOSIS — R63.8 INCREASED BMI: ICD-10-CM

## 2020-07-14 DIAGNOSIS — Z80.3 FAMILY HISTORY OF BREAST CANCER: ICD-10-CM

## 2020-07-14 DIAGNOSIS — N92.6 IRREGULAR MENSES: Primary | ICD-10-CM

## 2020-07-14 DIAGNOSIS — Z31.69 PRE-CONCEPTION COUNSELING: ICD-10-CM

## 2020-07-14 PROCEDURE — 99395 PREV VISIT EST AGE 18-39: CPT | Performed by: OBSTETRICS & GYNECOLOGY

## 2020-07-14 RX ORDER — SACCHAROMYCES BOULARDII 250 MG
250 CAPSULE ORAL 2 TIMES DAILY
COMMUNITY
End: 2020-09-23

## 2020-07-14 NOTE — PROGRESS NOTES
"Subjective     Chief Complaint   Patient presents with   • Gynecologic Exam     New gyn:former [facility] pt.,last pap 4/18,discuss irregular menses         History of Present Illness      Cesia Ambrose is a very pleasant  24 y.o. female who presents for annual exam.  Mammo Exam none, Contraception timing, Exercise 7 times a week  Patient is new to me, she formally saw my partner  History of increased body mass index, asthma, irregular menses and family history of breast cancer..      Obstetric History:  OB History    None        Menstrual History:     Patient's last menstrual period was 07/07/2020 (approximate).       Sexual History:       Past Medical History:   Diagnosis Date   • Asthma    • Environmental allergies    • GERD (gastroesophageal reflux disease)      Past Surgical History:   Procedure Laterality Date   • CYST REMOVAL     • NASAL SEPTUM SURGERY         SOCIAL Hx:      The following portions of the patient's history were reviewed and updated as appropriate: allergies, current medications, past family history, past medical history, past social history, past surgical history and problem list.    Review of Systems        Except as outlined in history of physical illness, patient denies any changes in her GYN, , GI systems.  All other systems reviewed were negative.         Objective   Physical Exam    /64   Ht 165.1 cm (65\")   Wt 93.9 kg (207 lb)   LMP 07/07/2020 (Approximate)   Breastfeeding No   BMI 34.45 kg/m²     General: Patient is alert and oriented and appears overall healthy  Neck: Is supple without thyromegaly, no carotid bruits and no lymphadenopathy  Lungs: Clear bilaterally, no wheezing, rhonchi, or rales.  Respiratory rate is normal  Breast: Even, symmetrical, no lymphadenopathy, no retraction, no masses or cysts  Heart: Regular rate and rhythm are appreciated, no murmurs or rubs are heard  Abdomen: Is soft, without organomegaly, bowel sounds are positive, there is no                 "                rebound or guarding and palpation does not produce any discomfort  Back: Nontender without CVA tenderness  Pelvic: External genitalia appear normal and consistent with mature female.  BUS normal                            Vagina is clean dry without discharge and appears adequately estrogenized, no               lesions or masses are present                         Cervix is noninflamed without discharge or lesions.  There is no cervical motion             tenderness.                Uterus is nonenlarged, without tenderness, and no masses or abnormalities are  present               Adnexa are non-enlarged, non tender               Rectal exam reveals adequate sphincter tone and no masses or lesions are                     appreciated on digital rectal examination.      Annual Well Woman Exam  Patient Active Problem List   Diagnosis   • Migraine without aura and with status migrainosus, not intractable   • Pre-conception counseling   • Family history of breast cancer                 Assessment/Plan   Cesia was seen today for gynecologic exam.    Diagnoses and all orders for this visit:    Irregular menses  -     DHEA-Sulfate  -     Follicle Stimulating Hormone  -     Hemoglobin A1c  -     TSH  -     Luteinizing Hormone    Increased BMI    Encounter for annual physical exam    Pre-conception counseling    Family history of breast cancer    Other orders  -     progesterone (PROMETRIUM) 100 MG capsule; Cycle day 15 through 24    Myriad screening recommended and ordered    Discussed today's findings and concerns with patient.  Continue to recommend regular exercise including cardiovascular and resistance training as well as  breast self-exam. Wellness lab, mammography, & pap smear, in accordance with age guidelines.    I have encouraged her to call for today's test results if she has not received them within 10 days.  Patient is advised to call with any change in her condition or with any other questions,  otherwise return  for annual examination.

## 2020-07-15 ENCOUNTER — TELEPHONE (OUTPATIENT)
Dept: OBSTETRICS AND GYNECOLOGY | Age: 25
End: 2020-07-15

## 2020-07-15 LAB
DHEA-S SERPL-MCNC: 187 UG/DL (ref 84.8–378)
FSH SERPL-ACNC: 8.2 MIU/ML
HBA1C MFR BLD: 5.6 % (ref 4.8–5.6)
LH SERPL-ACNC: 6.9 MIU/ML
TSH SERPL DL<=0.005 MIU/L-ACNC: 1.17 UIU/ML (ref 0.27–4.2)

## 2020-07-15 NOTE — TELEPHONE ENCOUNTER
----- Message from Chavez Garcia MD sent at 7/15/2020 11:43 AM EDT -----  Please notify pt. That labs are with in normal limits

## 2020-07-17 LAB
C TRACH RRNA CVX QL NAA+PROBE: NEGATIVE
CONV .: NORMAL
CYTOLOGIST CVX/VAG CYTO: NORMAL
CYTOLOGY CVX/VAG DOC CYTO: NORMAL
CYTOLOGY CVX/VAG DOC THIN PREP: NORMAL
DX ICD CODE: NORMAL
HIV 1 & 2 AB SER-IMP: NORMAL
N GONORRHOEA RRNA CVX QL NAA+PROBE: NEGATIVE
OTHER STN SPEC: NORMAL
STAT OF ADQ CVX/VAG CYTO-IMP: NORMAL
T VAGINALIS RRNA SPEC QL NAA+PROBE: NEGATIVE

## 2020-08-13 ENCOUNTER — DOCUMENTATION (OUTPATIENT)
Dept: OBSTETRICS AND GYNECOLOGY | Age: 25
End: 2020-08-13

## 2020-08-13 ENCOUNTER — TELEPHONE (OUTPATIENT)
Dept: OBSTETRICS AND GYNECOLOGY | Age: 25
End: 2020-08-13

## 2020-08-13 PROBLEM — Z13.71 BRCA NEGATIVE: Status: ACTIVE | Noted: 2020-08-13

## 2020-09-23 ENCOUNTER — APPOINTMENT (OUTPATIENT)
Dept: ULTRASOUND IMAGING | Facility: HOSPITAL | Age: 25
End: 2020-09-23

## 2020-09-23 ENCOUNTER — TELEPHONE (OUTPATIENT)
Dept: OBSTETRICS AND GYNECOLOGY | Age: 25
End: 2020-09-23

## 2020-09-23 ENCOUNTER — HOSPITAL ENCOUNTER (EMERGENCY)
Facility: HOSPITAL | Age: 25
Discharge: HOME | End: 2020-09-23
Attending: EMERGENCY MEDICINE | Admitting: EMERGENCY MEDICINE

## 2020-09-23 VITALS
HEIGHT: 65 IN | HEART RATE: 91 BPM | RESPIRATION RATE: 12 BRPM | OXYGEN SATURATION: 100 % | DIASTOLIC BLOOD PRESSURE: 74 MMHG | BODY MASS INDEX: 34.66 KG/M2 | TEMPERATURE: 98.9 F | WEIGHT: 208 LBS | SYSTOLIC BLOOD PRESSURE: 116 MMHG

## 2020-09-23 DIAGNOSIS — Z3A.01 LESS THAN 8 WEEKS GESTATION OF PREGNANCY: Primary | ICD-10-CM

## 2020-09-23 DIAGNOSIS — R11.10 VOMITING, INTRACTABILITY OF VOMITING NOT SPECIFIED, PRESENCE OF NAUSEA NOT SPECIFIED, UNSPECIFIED VOMITING TYPE: ICD-10-CM

## 2020-09-23 LAB
ABO GROUP BLD: NORMAL
ALBUMIN SERPL-MCNC: 4.3 G/DL (ref 3.5–5.2)
ALBUMIN/GLOB SERPL: 1.3 G/DL
ALP SERPL-CCNC: 67 U/L (ref 39–117)
ALT SERPL W P-5'-P-CCNC: 42 U/L (ref 1–33)
ANION GAP SERPL CALCULATED.3IONS-SCNC: 11.3 MMOL/L (ref 5–15)
AST SERPL-CCNC: 24 U/L (ref 1–32)
BASOPHILS # BLD AUTO: 0.12 10*3/MM3 (ref 0–0.2)
BASOPHILS NFR BLD AUTO: 0.7 % (ref 0–1.5)
BILIRUB SERPL-MCNC: 0.2 MG/DL (ref 0–1.2)
BILIRUB UR QL STRIP: NEGATIVE
BLD GP AB SCN SERPL QL: NEGATIVE
BUN SERPL-MCNC: 9 MG/DL (ref 6–20)
BUN/CREAT SERPL: 12.3 (ref 7–25)
CALCIUM SPEC-SCNC: 9.5 MG/DL (ref 8.6–10.5)
CHLORIDE SERPL-SCNC: 104 MMOL/L (ref 98–107)
CLARITY UR: CLEAR
CO2 SERPL-SCNC: 20.7 MMOL/L (ref 22–29)
COLOR UR: YELLOW
CREAT SERPL-MCNC: 0.73 MG/DL (ref 0.57–1)
DEPRECATED RDW RBC AUTO: 39.4 FL (ref 37–54)
EOSINOPHIL # BLD AUTO: 0.44 10*3/MM3 (ref 0–0.4)
EOSINOPHIL NFR BLD AUTO: 2.7 % (ref 0.3–6.2)
ERYTHROCYTE [DISTWIDTH] IN BLOOD BY AUTOMATED COUNT: 13 % (ref 12.3–15.4)
GFR SERPL CREATININE-BSD FRML MDRD: 96 ML/MIN/1.73
GLOBULIN UR ELPH-MCNC: 3.4 GM/DL
GLUCOSE SERPL-MCNC: 78 MG/DL (ref 65–99)
GLUCOSE UR STRIP-MCNC: NEGATIVE MG/DL
HCG INTACT+B SERPL-ACNC: NORMAL MIU/ML
HCT VFR BLD AUTO: 39.9 % (ref 34–46.6)
HGB BLD-MCNC: 13.3 G/DL (ref 12–15.9)
HGB UR QL STRIP.AUTO: NEGATIVE
HOLD SPECIMEN: NORMAL
HOLD SPECIMEN: NORMAL
IMM GRANULOCYTES # BLD AUTO: 0.07 10*3/MM3 (ref 0–0.05)
IMM GRANULOCYTES NFR BLD AUTO: 0.4 % (ref 0–0.5)
KETONES UR QL STRIP: NEGATIVE
LEUKOCYTE ESTERASE UR QL STRIP.AUTO: NEGATIVE
LIPASE SERPL-CCNC: 18 U/L (ref 13–60)
LYMPHOCYTES # BLD AUTO: 3.24 10*3/MM3 (ref 0.7–3.1)
LYMPHOCYTES NFR BLD AUTO: 19.9 % (ref 19.6–45.3)
MCH RBC QN AUTO: 27.7 PG (ref 26.6–33)
MCHC RBC AUTO-ENTMCNC: 33.3 G/DL (ref 31.5–35.7)
MCV RBC AUTO: 83.1 FL (ref 79–97)
MONOCYTES # BLD AUTO: 0.91 10*3/MM3 (ref 0.1–0.9)
MONOCYTES NFR BLD AUTO: 5.6 % (ref 5–12)
NEUTROPHILS NFR BLD AUTO: 11.53 10*3/MM3 (ref 1.7–7)
NEUTROPHILS NFR BLD AUTO: 70.7 % (ref 42.7–76)
NITRITE UR QL STRIP: NEGATIVE
NRBC BLD AUTO-RTO: 0 /100 WBC (ref 0–0.2)
PH UR STRIP.AUTO: 6 [PH] (ref 5–8)
PLATELET # BLD AUTO: 370 10*3/MM3 (ref 140–450)
PMV BLD AUTO: 9.9 FL (ref 6–12)
POTASSIUM SERPL-SCNC: 3.9 MMOL/L (ref 3.5–5.2)
PROT SERPL-MCNC: 7.7 G/DL (ref 6–8.5)
PROT UR QL STRIP: NEGATIVE
RBC # BLD AUTO: 4.8 10*6/MM3 (ref 3.77–5.28)
RH BLD: POSITIVE
SODIUM SERPL-SCNC: 136 MMOL/L (ref 136–145)
SP GR UR STRIP: 1.01 (ref 1–1.03)
T&S EXPIRATION DATE: NORMAL
UROBILINOGEN UR QL STRIP: NORMAL
WBC # BLD AUTO: 16.31 10*3/MM3 (ref 3.4–10.8)
WHOLE BLOOD HOLD SPECIMEN: NORMAL
WHOLE BLOOD HOLD SPECIMEN: NORMAL

## 2020-09-23 PROCEDURE — 99283 EMERGENCY DEPT VISIT LOW MDM: CPT

## 2020-09-23 PROCEDURE — 80053 COMPREHEN METABOLIC PANEL: CPT | Performed by: EMERGENCY MEDICINE

## 2020-09-23 PROCEDURE — 86900 BLOOD TYPING SEROLOGIC ABO: CPT | Performed by: EMERGENCY MEDICINE

## 2020-09-23 PROCEDURE — 83690 ASSAY OF LIPASE: CPT | Performed by: EMERGENCY MEDICINE

## 2020-09-23 PROCEDURE — 85025 COMPLETE CBC W/AUTO DIFF WBC: CPT | Performed by: EMERGENCY MEDICINE

## 2020-09-23 PROCEDURE — 86901 BLOOD TYPING SEROLOGIC RH(D): CPT | Performed by: EMERGENCY MEDICINE

## 2020-09-23 PROCEDURE — 81003 URINALYSIS AUTO W/O SCOPE: CPT | Performed by: EMERGENCY MEDICINE

## 2020-09-23 PROCEDURE — 96374 THER/PROPH/DIAG INJ IV PUSH: CPT

## 2020-09-23 PROCEDURE — 93976 VASCULAR STUDY: CPT

## 2020-09-23 PROCEDURE — 25010000002 METOCLOPRAMIDE PER 10 MG: Performed by: EMERGENCY MEDICINE

## 2020-09-23 PROCEDURE — 86850 RBC ANTIBODY SCREEN: CPT | Performed by: EMERGENCY MEDICINE

## 2020-09-23 PROCEDURE — 76817 TRANSVAGINAL US OBSTETRIC: CPT

## 2020-09-23 PROCEDURE — 84702 CHORIONIC GONADOTROPIN TEST: CPT | Performed by: EMERGENCY MEDICINE

## 2020-09-23 RX ORDER — SODIUM CHLORIDE 0.9 % (FLUSH) 0.9 %
10 SYRINGE (ML) INJECTION AS NEEDED
Status: DISCONTINUED | OUTPATIENT
Start: 2020-09-23 | End: 2020-09-23 | Stop reason: HOSPADM

## 2020-09-23 RX ORDER — PROMETHAZINE HYDROCHLORIDE 12.5 MG/1
12.5 TABLET ORAL EVERY 6 HOURS PRN
COMMUNITY
End: 2020-12-01

## 2020-09-23 RX ORDER — PRENATAL VIT NO.126/IRON/FOLIC 28MG-0.8MG
TABLET ORAL DAILY
COMMUNITY
End: 2022-06-20

## 2020-09-23 RX ORDER — METOCLOPRAMIDE HYDROCHLORIDE 5 MG/ML
10 INJECTION INTRAMUSCULAR; INTRAVENOUS ONCE
Status: COMPLETED | OUTPATIENT
Start: 2020-09-23 | End: 2020-09-23

## 2020-09-23 RX ADMIN — METOCLOPRAMIDE HYDROCHLORIDE 10 MG: 5 INJECTION INTRAMUSCULAR; INTRAVENOUS at 19:28

## 2020-09-23 RX ADMIN — SODIUM CHLORIDE, POTASSIUM CHLORIDE, SODIUM LACTATE AND CALCIUM CHLORIDE 1000 ML: 600; 310; 30; 20 INJECTION, SOLUTION INTRAVENOUS at 19:28

## 2020-09-23 NOTE — ED TRIAGE NOTES
Pt currently 6-7 weeks pregnant, emesis x 2.5 weeks. Pt states unable to keep anything down.  No cough, SOA, or known covid exposures    Pt ambulated without assistance A&O x 4.     Patient wearing mask during interaction. Universal precautions plus mask, goggles utilized by this RN

## 2020-09-23 NOTE — TELEPHONE ENCOUNTER
Patient called, stated that she sent a message to you through [employer], patient wanted to know if you received the message? I seen that josé manuel routed it to you earlier this morning, I advised the patient that you've been with patient's and will respond at earliest convenience.      [phone]

## 2020-09-23 NOTE — ED PROVIDER NOTES
Subjective   25-year-old female G1, P0 who is pregnant for past 3 weeks here for chief complaint of vomiting and nausea.  History was obtained from the patient.  The patient states that she has been nauseated and vomiting nonbilious nonbloody for the past 2 and half weeks.  She states that she has not been able to keep anything down.  She called her OB, Dr. Garcia, who was concerned who told her to come to the ED.  Patient denies any vaginal bleeding or discharge.  Patient states that she has had some cramping in her abdomen but denies any abdominal pain.  She denies any diarrhea.  She denies any fevers or chills.  Patient denies any headaches, neck pain, sore throat, runny nose, cough, cold, chest pain, urinary symptoms, or any other symptoms.  Patient denies any covert contacts.          Review of Systems   All other systems reviewed and are negative.      Past Medical History:   Diagnosis Date   • Asthma    • Environmental allergies    • GERD (gastroesophageal reflux disease)        Allergies   Allergen Reactions   • Morphine Nausea And Vomiting   • Penicillins Rash       Past Surgical History:   Procedure Laterality Date   • CYST REMOVAL     • NASAL SEPTUM SURGERY         Family History   Problem Relation Age of Onset   • Irritable bowel syndrome Maternal Grandmother    • Crohn's disease Brother    • Prostate cancer Father    • Breast cancer Paternal Aunt    • Prostate cancer Paternal Uncle        Social History     Socioeconomic History   • Marital status:      Spouse name: Not on file   • Number of children: Not on file   • Years of education: Not on file   • Highest education level: Not on file   Tobacco Use   • Smoking status: Former Smoker     Packs/day: 0.25     Types: Cigarettes     Quit date:      Years since quittin.7   • Smokeless tobacco: Never Used   Substance and Sexual Activity   • Alcohol use: Not Currently   • Drug use: No   • Sexual activity: Defer           Objective   Physical  Exam  Vitals signs reviewed.   Constitutional:       General: She is not in acute distress.     Appearance: Normal appearance. She is not ill-appearing, toxic-appearing or diaphoretic.   HENT:      Head: Normocephalic and atraumatic.      Right Ear: External ear normal.      Left Ear: External ear normal.      Nose: Nose normal. No congestion or rhinorrhea.      Mouth/Throat:      Mouth: Mucous membranes are dry.      Pharynx: Oropharynx is clear. No oropharyngeal exudate or posterior oropharyngeal erythema.   Eyes:      General: No scleral icterus.        Right eye: No discharge.         Left eye: No discharge.      Extraocular Movements: Extraocular movements intact.      Conjunctiva/sclera: Conjunctivae normal.      Pupils: Pupils are equal, round, and reactive to light.   Neck:      Musculoskeletal: Normal range of motion and neck supple. No neck rigidity or muscular tenderness.   Cardiovascular:      Rate and Rhythm: Normal rate and regular rhythm.      Pulses: Normal pulses.      Heart sounds: Normal heart sounds. No murmur. No friction rub. No gallop.    Pulmonary:      Effort: Pulmonary effort is normal. No respiratory distress.      Breath sounds: Normal breath sounds. No stridor. No wheezing, rhonchi or rales.   Chest:      Chest wall: No tenderness.   Abdominal:      General: Abdomen is flat. Bowel sounds are normal. There is no distension.      Palpations: There is no mass.      Tenderness: There is no abdominal tenderness. There is no right CVA tenderness, left CVA tenderness, guarding or rebound.      Hernia: No hernia is present.   Musculoskeletal: Normal range of motion.         General: No swelling, tenderness, deformity or signs of injury.      Right lower leg: No edema.      Left lower leg: No edema.   Skin:     General: Skin is warm and dry.      Coloration: Skin is not jaundiced or pale.      Findings: No bruising, erythema, lesion or rash.   Neurological:      General: No focal deficit present.       Mental Status: She is alert and oriented to person, place, and time. Mental status is at baseline.      Cranial Nerves: No cranial nerve deficit.      Sensory: No sensory deficit.      Motor: No weakness.      Coordination: Coordination normal.      Gait: Gait normal.   Psychiatric:         Mood and Affect: Mood normal.         Behavior: Behavior normal.         Procedures           ED Course  ED Course as of Sep 23 2123   Wed Sep 23, 2020   2112 I spoke to OB/GYN on call with Dr. Garcia's practice.  I went over the labs with the OB/GYN.  The OB/GYN is comfortable with the patient going home.  The OB/GYN wanted the patient to call Dr. Garcia's office tomorrow morning to see if Dr. Garcia wanted to see the patient earlier.  This was communicated to the patient.    [KS]   2117 IMPRESSION:     Single live intrauterine pregnancy, as described.    [KS]      ED Course User Index  [KS] Navid Wilcox MD                                           MDM  Number of Diagnoses or Management Options  Less than 8 weeks gestation of pregnancy:   Vomiting, intractability of vomiting not specified, presence of nausea not specified, unspecified vomiting type:      Amount and/or Complexity of Data Reviewed  Clinical lab tests: ordered and reviewed  Tests in the radiology section of CPT®: ordered and reviewed    Risk of Complications, Morbidity, and/or Mortality  General comments: When I first saw the patient, the patient appeared a little dehydrated but it was stable.  Patient's vitals are stable.  Given that the patient was pregnant, I did get an IV and patient was given fluids.  The labs were ordered.  The labs are remarkable for mild elevation in AST and the other labs unremarkable.  Patient had no ketones in the urine.  Therefore, my suspicion for hyperemesis gravidarum is lower.  Patient had no abdominal pain on my exam therefore I think intra-abdominal pathology is less likely.  Given that the patient was complaining of some  crampiness and was pregnant, I did get a transvaginal ultrasound since the patient had not had a formal ultrasound.  This showed that the patient had single intrauterine uterine pregnancy.  Patient did not have an ectopic pregnancy.  Patient had no evidence of vaginal bleeding or vaginal discharge.  I did get a type and screen and patient was Rh+. Pt did have slight elevation in white count, which could be secondary to pregnancy versus the vomiting.  I still think appendicitis is less likely given that the patient had no right lower quadrant abdominal pain.  I think ectopic is unlikely given the ultrasound read.  Once I had all labs and imaging, I did speak to the OB/GYN on call with Dr. Garcia's practice.  They were comfortable with the patient going home.  They wanted the patient to call in the morning to see if Dr. Garcia wanted to see the patient earlier.  This was communicated to the patient.  The pt was given strict return precautions including any nausea, vomiting, abdominal pain, vaginal bleeding, vaginal discharge, chest pain, shortness of breath, or any other concerning symptoms.  All questions were answered.  Patient was told to follow-up with OB/GYN first thing in the morning tomorrow and call them.  Patient already has OB/GYN appointment for this coming Tuesday.  Patient was discharged in good condition.  Patient was p.o. challenged successfully prior to discharge today.  Pt did tell me that she is taking prenatal vitamins.  She was told to continue taking this.    Patient Progress  Patient progress: improved      Final diagnoses:   Less than 8 weeks gestation of pregnancy   Vomiting, intractability of vomiting not specified, presence of nausea not specified, unspecified vomiting type            Navid Wilcox MD  09/23/20 [id number]

## 2020-09-23 NOTE — ED NOTES
Report to Ashley BURTON.  Pt given urine spec. Kit and is in bathroom attempting to provide sample     Evy Garcia RN  09/23/20 6305

## 2020-09-24 RX ORDER — DOXYLAMINE SUCCINATE AND PYRIDOXINE HYDROCHLORIDE, DELAYED RELEASE TABLETS 10 MG/10 MG 10; 10 MG/1; MG/1
2 TABLET, DELAYED RELEASE ORAL NIGHTLY PRN
Qty: 60 TABLET | Refills: 4 | Status: ON HOLD | OUTPATIENT
Start: 2020-09-24 | End: 2021-02-26

## 2020-09-24 RX ORDER — ONDANSETRON 4 MG/1
4 TABLET, FILM COATED ORAL EVERY 12 HOURS PRN
Qty: 30 TABLET | Refills: 3 | Status: SHIPPED | OUTPATIENT
Start: 2020-09-24 | End: 2020-10-24

## 2020-09-24 NOTE — TELEPHONE ENCOUNTER
Patient called again this morning stated that she went to the E.R last night and was advised to call the office this morning to see if she needed to be sooner? Patient has an appointment scheduled with you next Tuesday 09/29/20 for a pregnancy conformation appointment.  Please advise

## 2020-09-24 NOTE — TELEPHONE ENCOUNTER
I am glad patient went to the ER and received IV fluids.  I have reviewed all the lab work.  There was not any significant dehydration and urine was negative for ketones which is very encouraging.  The ultrasound was normal and firmed a healthy viable intrauterine pregnancy.  There is no need for patient to come in again today.  Keep next week's appointment.  I have called in a couple medications for her nausea.  Please pick those up if she does not already have them.  If she needs to hold on the prenatal vitamin to minimize her nausea that is fine until we see her.  Concentrate mostly on fluid intake over the next 4 to 5 days.

## 2020-09-24 NOTE — DISCHARGE INSTRUCTIONS
Call Dr. Ochoa's office first thing in the morning tomorrow to see if he wants to move up your appointment from this coming Tuesday to this week.  Return to the ED if you have any episodes of vomiting, nausea, abdominal pain, chest pain, shortness of breath, or any other concerning symptoms.

## 2020-09-24 NOTE — TELEPHONE ENCOUNTER
Called patient unable to leave vm, mailbox is not set up. (will call patient again in a few hours)

## 2020-09-25 NOTE — TELEPHONE ENCOUNTER
Spoke with pt this morning, she is feeling much better. I gave her all of the information, she is aware of both RX's at the pharmacy. The Diclegis is not covered, I recommended trying a GoodRx card to see if that may offer a discount. She will follow all instructions and plan to see us next week.

## 2020-09-29 ENCOUNTER — INITIAL PRENATAL (OUTPATIENT)
Dept: OBSTETRICS AND GYNECOLOGY | Age: 25
End: 2020-09-29

## 2020-09-29 VITALS — WEIGHT: 210 LBS | DIASTOLIC BLOOD PRESSURE: 74 MMHG | BODY MASS INDEX: 34.95 KG/M2 | SYSTOLIC BLOOD PRESSURE: 124 MMHG

## 2020-09-29 DIAGNOSIS — Z34.01 ENCOUNTER FOR SUPERVISION OF NORMAL FIRST PREGNANCY IN FIRST TRIMESTER: Primary | ICD-10-CM

## 2020-09-29 LAB
GLUCOSE UR STRIP-MCNC: NEGATIVE MG/DL
PROT UR STRIP-MCNC: NEGATIVE MG/DL

## 2020-09-29 PROCEDURE — 0501F PRENATAL FLOW SHEET: CPT | Performed by: OBSTETRICS & GYNECOLOGY

## 2020-09-29 RX ORDER — ALBUTEROL SULFATE 90 UG/1
AEROSOL, METERED RESPIRATORY (INHALATION)
COMMUNITY
Start: 2020-09-22

## 2020-09-29 RX ORDER — ALBUTEROL SULFATE 2.5 MG/3ML
SOLUTION RESPIRATORY (INHALATION)
COMMUNITY
Start: 2020-08-05

## 2020-09-29 RX ORDER — PEAK FLOW METER
EACH MISCELLANEOUS
COMMUNITY
Start: 2020-08-06 | End: 2022-08-31

## 2020-09-29 NOTE — PROGRESS NOTES
Chief Complaint   Patient presents with   • Initial Prenatal Visit     NOB:LMP 8/3/20,u/s     HPI- Pt is 25 y.o.  at 8w1d here for prenatal visit.     ROS-     - No vaginal bleeding    GI- No abdominal pain    /74   Wt 95.3 kg (210 lb)   LMP 2020 (Exact Date)   BMI 34.95 kg/m²   Exam - See flow sheet    Fetal heart rate is normal    Assessment-  Diagnoses and all orders for this visit:    Encounter for supervision of normal first pregnancy in first trimester  -     Urine Culture - Urine, Urine, Clean Catch  -     POC Urinalysis Dipstick    Other orders  -     albuterol (PROVENTIL) (2.5 MG/3ML) 0.083% nebulizer solution; U 1 UNIT VIA NEB QID PRN  -     albuterol sulfate  (90 Base) MCG/ACT inhaler; INHALE 2 PUFFS QID PRF SOB  -     Nebulizers (Vios Aerosol Delivery System) misc; U UTD  -     mometasone-formoterol (DULERA 100) 100-5 MCG/ACT inhaler; Inhale 2 puffs 2 (Two) Times a Day.      The lab work reviewed, ultrasound reassuring, Zofran seems to be working.  Will call with any changes.

## 2020-10-01 LAB
BACTERIA UR CULT: NORMAL
BACTERIA UR CULT: NORMAL

## 2020-11-03 ENCOUNTER — ROUTINE PRENATAL (OUTPATIENT)
Dept: OBSTETRICS AND GYNECOLOGY | Age: 25
End: 2020-11-03

## 2020-11-03 VITALS — SYSTOLIC BLOOD PRESSURE: 122 MMHG | BODY MASS INDEX: 35.61 KG/M2 | WEIGHT: 214 LBS | DIASTOLIC BLOOD PRESSURE: 72 MMHG

## 2020-11-03 DIAGNOSIS — Z34.02 ENCOUNTER FOR SUPERVISION OF NORMAL FIRST PREGNANCY IN SECOND TRIMESTER: Primary | ICD-10-CM

## 2020-11-03 LAB
GLUCOSE UR STRIP-MCNC: NEGATIVE MG/DL
PROT UR STRIP-MCNC: NEGATIVE MG/DL

## 2020-11-03 PROCEDURE — 0502F SUBSEQUENT PRENATAL CARE: CPT | Performed by: OBSTETRICS & GYNECOLOGY

## 2020-11-03 RX ORDER — ONDANSETRON 4 MG/1
TABLET, ORALLY DISINTEGRATING ORAL
COMMUNITY
Start: 2020-10-30 | End: 2020-12-29

## 2020-11-03 NOTE — PROGRESS NOTES
Chief Complaint   Patient presents with   • Routine Prenatal Visit     HPI- Pt is 25 y.o.  at 13w1d here for prenatal visit.     ROS-     - No vaginal bleeding    GI- No abdominal pain    /72   Wt 97.1 kg (214 lb)   LMP 2020 (Exact Date)   BMI 35.61 kg/m²   Exam - See flow sheet    Fetal heart rate is normal    Assessment-  Diagnoses and all orders for this visit:    Encounter for supervision of normal first pregnancy in second trimester  -     POC Urinalysis Dipstick    Other orders  -     ondansetron ODT (ZOFRAN-ODT) 4 MG disintegrating tablet; TK 1 T PO Q 12 H PRF NAUSEA OR VOM    Improved doing well gained some weight urine is clear reviewed lab work.  Patient received flu shot, continue prenatal vitamins.  Ultrasound at 20 weeks

## 2020-12-01 ENCOUNTER — ROUTINE PRENATAL (OUTPATIENT)
Dept: OBSTETRICS AND GYNECOLOGY | Age: 25
End: 2020-12-01

## 2020-12-01 VITALS — WEIGHT: 215 LBS | SYSTOLIC BLOOD PRESSURE: 124 MMHG | DIASTOLIC BLOOD PRESSURE: 72 MMHG | BODY MASS INDEX: 35.78 KG/M2

## 2020-12-01 DIAGNOSIS — Z34.02 ENCOUNTER FOR SUPERVISION OF NORMAL FIRST PREGNANCY IN SECOND TRIMESTER: Primary | ICD-10-CM

## 2020-12-01 LAB
GLUCOSE UR STRIP-MCNC: NEGATIVE MG/DL
PROT UR STRIP-MCNC: NEGATIVE MG/DL

## 2020-12-01 PROCEDURE — 0502F SUBSEQUENT PRENATAL CARE: CPT | Performed by: OBSTETRICS & GYNECOLOGY

## 2020-12-01 RX ORDER — PROMETHAZINE HYDROCHLORIDE 25 MG/1
25 TABLET ORAL EVERY 6 HOURS PRN
Qty: 14 TABLET | Refills: 2 | Status: ON HOLD | OUTPATIENT
Start: 2020-12-01 | End: 2021-02-26

## 2020-12-01 NOTE — PROGRESS NOTES
Patient is doing well, nausea is somewhat improved will restart some Phenergan in addition to her Zofran.  She did gain weight her urine is clear we seen good flow fundal height growth.  We will check anatomy ultrasound in 3 weeks time okay for her  to come in.

## 2020-12-22 ENCOUNTER — ROUTINE PRENATAL (OUTPATIENT)
Dept: OBSTETRICS AND GYNECOLOGY | Age: 25
End: 2020-12-22

## 2020-12-22 VITALS — BODY MASS INDEX: 36.44 KG/M2 | DIASTOLIC BLOOD PRESSURE: 64 MMHG | WEIGHT: 219 LBS | SYSTOLIC BLOOD PRESSURE: 118 MMHG

## 2020-12-22 DIAGNOSIS — Z34.02 ENCOUNTER FOR SUPERVISION OF NORMAL FIRST PREGNANCY IN SECOND TRIMESTER: Primary | ICD-10-CM

## 2020-12-22 DIAGNOSIS — K21.9 GASTROESOPHAGEAL REFLUX DISEASE WITHOUT ESOPHAGITIS: ICD-10-CM

## 2020-12-22 DIAGNOSIS — O44.22 PARTIAL PLACENTA PREVIA NOS OR WITHOUT HEMORRHAGE, SECOND TRIMESTER: ICD-10-CM

## 2020-12-22 LAB
GLUCOSE UR STRIP-MCNC: NEGATIVE MG/DL
PROT UR STRIP-MCNC: NEGATIVE MG/DL

## 2020-12-22 PROCEDURE — 0502F SUBSEQUENT PRENATAL CARE: CPT | Performed by: OBSTETRICS & GYNECOLOGY

## 2020-12-22 RX ORDER — FAMOTIDINE 20 MG/1
20 TABLET, FILM COATED ORAL DAILY
Qty: 30 TABLET | Refills: 3 | Status: ON HOLD | OUTPATIENT
Start: 2020-12-22 | End: 2021-02-26

## 2020-12-22 NOTE — PROGRESS NOTES
Patient notes good fetal movement, she has no complaints except her reflux is starting to worsen.  We are going to send some Pepcid.  Her nausea is somewhat improved  She has gained weight since last visit ultrasounds reassuring but limited and we will need outflow tract reassessed in 2 visits and also recheck placental location

## 2020-12-29 RX ORDER — ONDANSETRON 4 MG/1
TABLET, ORALLY DISINTEGRATING ORAL
Qty: 30 TABLET | Refills: 2 | Status: SHIPPED | OUTPATIENT
Start: 2020-12-29 | End: 2022-06-20

## 2021-01-20 ENCOUNTER — ROUTINE PRENATAL (OUTPATIENT)
Dept: OBSTETRICS AND GYNECOLOGY | Age: 26
End: 2021-01-20

## 2021-01-20 DIAGNOSIS — R63.8 INCREASED BMI: ICD-10-CM

## 2021-01-20 DIAGNOSIS — Z34.02 ENCOUNTER FOR SUPERVISION OF NORMAL FIRST PREGNANCY IN SECOND TRIMESTER: Primary | ICD-10-CM

## 2021-01-20 LAB
GLUCOSE 1H P 50 G GLC PO SERPL-MCNC: 114 MG/DL (ref 65–139)
GLUCOSE UR STRIP-MCNC: NEGATIVE MG/DL
HGB BLD-MCNC: 11 G/DL (ref 12–15.9)
PROT UR STRIP-MCNC: NEGATIVE MG/DL

## 2021-01-20 PROCEDURE — 0502F SUBSEQUENT PRENATAL CARE: CPT | Performed by: OBSTETRICS & GYNECOLOGY

## 2021-01-20 NOTE — PROGRESS NOTES
Chief Complaint   Patient presents with   • Routine Prenatal Visit     1 hr gtt     HPI- Pt is 25 y.o.  at 24w2d here for prenatal visit.     ROS-     - No vaginal bleeding    GI- No abdominal pain    LMP 2020 (Exact Date)   Exam - See flow sheet    Fetal heart rate is normal    Assessment-  Diagnoses and all orders for this visit:    Encounter for supervision of normal first pregnancy in second trimester  -     POC Urinalysis Dipstick  -     Gestational Screen 1 Hr ([facility])  -     Hemoglobin    Increased BMI    Glucose hemoglobin today, repeat ultrasound next visit to look at placental location and get a better outflow of the heart.  Pertussis next visit as well.

## 2021-01-22 ENCOUNTER — TELEPHONE (OUTPATIENT)
Dept: OBSTETRICS AND GYNECOLOGY | Age: 26
End: 2021-01-22

## 2021-02-15 ENCOUNTER — ROUTINE PRENATAL (OUTPATIENT)
Dept: OBSTETRICS AND GYNECOLOGY | Age: 26
End: 2021-02-15

## 2021-02-15 VITALS — BODY MASS INDEX: 38.44 KG/M2 | WEIGHT: 231 LBS | DIASTOLIC BLOOD PRESSURE: 80 MMHG | SYSTOLIC BLOOD PRESSURE: 124 MMHG

## 2021-02-15 DIAGNOSIS — Z34.02 ENCOUNTER FOR SUPERVISION OF NORMAL FIRST PREGNANCY IN SECOND TRIMESTER: Primary | ICD-10-CM

## 2021-02-15 LAB
GLUCOSE UR STRIP-MCNC: NEGATIVE MG/DL
PROT UR STRIP-MCNC: NEGATIVE MG/DL

## 2021-02-15 PROCEDURE — 90471 IMMUNIZATION ADMIN: CPT | Performed by: OBSTETRICS & GYNECOLOGY

## 2021-02-15 PROCEDURE — 0502F SUBSEQUENT PRENATAL CARE: CPT | Performed by: OBSTETRICS & GYNECOLOGY

## 2021-02-15 PROCEDURE — 90715 TDAP VACCINE 7 YRS/> IM: CPT | Performed by: OBSTETRICS & GYNECOLOGY

## 2021-02-15 RX ORDER — FERROUS SULFATE 325(65) MG
TABLET ORAL
COMMUNITY
End: 2022-06-20

## 2021-02-15 NOTE — PROGRESS NOTES
Chief Complaint   Patient presents with   • Pregnancy Ultrasound     Anatomy scan     HPI- Pt is 25 y.o.  at 28w0d here for prenatal visit.     ROS-     - No vaginal bleeding    GI- No abdominal pain    /80   Wt 105 kg (231 lb)   LMP 2020 (Exact Date)   BMI 38.44 kg/m²   Exam - See flow sheet    Fetal heart rate is normal    Assessment-  Diagnoses and all orders for this visit:    Encounter for supervision of normal first pregnancy in second trimester  -     POC Urinalysis Dipstick    Other orders  -     Ferrous Sulfate Dried (Feosol) 200 (65 Fe) MG tablet tablet; Take  by mouth 3 (Three) Times a Day With Meals.      Today's ultrasound reveals normal anatomy scan, previous incomplete cardiac four-chamber view was normal today  Cervix is normal  Reviewed patient's glucose and hemoglobin, continue prenatal vitamins with 1 iron daily.  Tdap given today  Signs symptoms of labor reviewed

## 2021-02-26 ENCOUNTER — TELEPHONE (OUTPATIENT)
Dept: OBSTETRICS AND GYNECOLOGY | Age: 26
End: 2021-02-26

## 2021-02-26 ENCOUNTER — HOSPITAL ENCOUNTER (OUTPATIENT)
Facility: HOSPITAL | Age: 26
End: 2021-02-26
Attending: OBSTETRICS & GYNECOLOGY | Admitting: OBSTETRICS & GYNECOLOGY

## 2021-02-26 ENCOUNTER — HOSPITAL ENCOUNTER (EMERGENCY)
Facility: HOSPITAL | Age: 26
Discharge: HOME | End: 2021-02-26
Attending: OBSTETRICS & GYNECOLOGY | Admitting: OBSTETRICS & GYNECOLOGY

## 2021-02-26 VITALS
TEMPERATURE: 98.7 F | HEIGHT: 65 IN | BODY MASS INDEX: 39.32 KG/M2 | SYSTOLIC BLOOD PRESSURE: 126 MMHG | DIASTOLIC BLOOD PRESSURE: 71 MMHG | HEART RATE: 85 BPM | RESPIRATION RATE: 16 BRPM | WEIGHT: 236 LBS

## 2021-02-26 PROBLEM — R10.9 ABDOMINAL PAIN AFFECTING PREGNANCY, ANTEPARTUM: Status: ACTIVE | Noted: 2021-02-26

## 2021-02-26 PROBLEM — O26.899 ABDOMINAL PAIN AFFECTING PREGNANCY, ANTEPARTUM: Status: ACTIVE | Noted: 2021-02-26

## 2021-02-26 LAB
ALBUMIN SERPL-MCNC: 3.7 G/DL (ref 3.5–5.2)
ALBUMIN/GLOB SERPL: 1.2 G/DL
ALP SERPL-CCNC: 72 U/L (ref 39–117)
ALT SERPL W P-5'-P-CCNC: 10 U/L (ref 1–33)
AMYLASE SERPL-CCNC: 37 U/L (ref 28–100)
ANION GAP SERPL CALCULATED.3IONS-SCNC: 11.7 MMOL/L (ref 5–15)
AST SERPL-CCNC: 17 U/L (ref 1–32)
BASOPHILS # BLD AUTO: 0.08 10*3/MM3 (ref 0–0.2)
BASOPHILS NFR BLD AUTO: 0.7 % (ref 0–1.5)
BILIRUB SERPL-MCNC: <0.2 MG/DL (ref 0–1.2)
BILIRUB UR QL STRIP: NEGATIVE
BLOODY SPECIMEN?: NO
BUN SERPL-MCNC: 5 MG/DL (ref 6–20)
BUN/CREAT SERPL: 10.9 (ref 7–25)
CALCIUM SPEC-SCNC: 8.8 MG/DL (ref 8.6–10.5)
CHLORIDE SERPL-SCNC: 106 MMOL/L (ref 98–107)
CLARITY UR: CLEAR
CO2 SERPL-SCNC: 19.3 MMOL/L (ref 22–29)
COLOR UR: YELLOW
CREAT SERPL-MCNC: 0.46 MG/DL (ref 0.57–1)
DEPRECATED RDW RBC AUTO: 41.4 FL (ref 37–54)
EOSINOPHIL # BLD AUTO: 0.41 10*3/MM3 (ref 0–0.4)
EOSINOPHIL NFR BLD AUTO: 3.6 % (ref 0.3–6.2)
ERYTHROCYTE [DISTWIDTH] IN BLOOD BY AUTOMATED COUNT: 13.4 % (ref 12.3–15.4)
FIBRONECTIN FETAL VAG QL: NEGATIVE
GFR SERPL CREATININE-BSD FRML MDRD: >150 ML/MIN/1.73
GGT SERPL-CCNC: 13 U/L (ref 5–36)
GLOBULIN UR ELPH-MCNC: 3.2 GM/DL
GLUCOSE SERPL-MCNC: 83 MG/DL (ref 65–99)
GLUCOSE UR STRIP-MCNC: NEGATIVE MG/DL
HCT VFR BLD AUTO: 33.7 % (ref 34–46.6)
HGB BLD-MCNC: 11 G/DL (ref 12–15.9)
HGB UR QL STRIP.AUTO: NEGATIVE
IMM GRANULOCYTES # BLD AUTO: 0.19 10*3/MM3 (ref 0–0.05)
IMM GRANULOCYTES NFR BLD AUTO: 1.7 % (ref 0–0.5)
KETONES UR QL STRIP: NEGATIVE
LEUKOCYTE ESTERASE UR QL STRIP.AUTO: NEGATIVE
LIPASE SERPL-CCNC: 16 U/L (ref 13–60)
LYMPHOCYTES # BLD AUTO: 1.75 10*3/MM3 (ref 0.7–3.1)
LYMPHOCYTES NFR BLD AUTO: 15.2 % (ref 19.6–45.3)
MCH RBC QN AUTO: 27.4 PG (ref 26.6–33)
MCHC RBC AUTO-ENTMCNC: 32.6 G/DL (ref 31.5–35.7)
MCV RBC AUTO: 83.8 FL (ref 79–97)
MONOCYTES # BLD AUTO: 0.81 10*3/MM3 (ref 0.1–0.9)
MONOCYTES NFR BLD AUTO: 7 % (ref 5–12)
NEUTROPHILS NFR BLD AUTO: 71.8 % (ref 42.7–76)
NEUTROPHILS NFR BLD AUTO: 8.25 10*3/MM3 (ref 1.7–7)
NITRITE UR QL STRIP: NEGATIVE
NRBC BLD AUTO-RTO: 0 /100 WBC (ref 0–0.2)
PH UR STRIP.AUTO: 7 [PH] (ref 5–8)
PLATELET # BLD AUTO: 362 10*3/MM3 (ref 140–450)
PMV BLD AUTO: 9.7 FL (ref 6–12)
POTASSIUM SERPL-SCNC: 3.8 MMOL/L (ref 3.5–5.2)
PROT SERPL-MCNC: 6.9 G/DL (ref 6–8.5)
PROT UR QL STRIP: NEGATIVE
RBC # BLD AUTO: 4.02 10*6/MM3 (ref 3.77–5.28)
SODIUM SERPL-SCNC: 137 MMOL/L (ref 136–145)
SP GR UR STRIP: 1.01 (ref 1–1.03)
UROBILINOGEN UR QL STRIP: NORMAL
WBC # BLD AUTO: 11.49 10*3/MM3 (ref 3.4–10.8)

## 2021-02-26 PROCEDURE — 81003 URINALYSIS AUTO W/O SCOPE: CPT | Performed by: OBSTETRICS & GYNECOLOGY

## 2021-02-26 PROCEDURE — 83690 ASSAY OF LIPASE: CPT | Performed by: OBSTETRICS & GYNECOLOGY

## 2021-02-26 PROCEDURE — 87086 URINE CULTURE/COLONY COUNT: CPT | Performed by: OBSTETRICS & GYNECOLOGY

## 2021-02-26 PROCEDURE — 82150 ASSAY OF AMYLASE: CPT | Performed by: OBSTETRICS & GYNECOLOGY

## 2021-02-26 PROCEDURE — 80053 COMPREHEN METABOLIC PANEL: CPT | Performed by: OBSTETRICS & GYNECOLOGY

## 2021-02-26 PROCEDURE — 59025 FETAL NON-STRESS TEST: CPT

## 2021-02-26 PROCEDURE — 85025 COMPLETE CBC W/AUTO DIFF WBC: CPT | Performed by: OBSTETRICS & GYNECOLOGY

## 2021-02-26 PROCEDURE — 82977 ASSAY OF GGT: CPT | Performed by: OBSTETRICS & GYNECOLOGY

## 2021-02-26 PROCEDURE — 82731 ASSAY OF FETAL FIBRONECTIN: CPT | Performed by: OBSTETRICS & GYNECOLOGY

## 2021-02-26 PROCEDURE — 99284 EMERGENCY DEPT VISIT MOD MDM: CPT | Performed by: OBSTETRICS & GYNECOLOGY

## 2021-02-26 NOTE — TELEPHONE ENCOUNTER
Dr. Garcia pt calling OB, 29 weeks, is having abdominal pain, 3 days ago and seems to be getting worse, pt is having nausea and vomiting. Yes, fetal movement, no fever no chills.  No bleeding, spotting or discharge.

## 2021-02-27 LAB — BACTERIA SPEC AEROBE CULT: NORMAL

## 2021-03-08 ENCOUNTER — ROUTINE PRENATAL (OUTPATIENT)
Dept: OBSTETRICS AND GYNECOLOGY | Age: 26
End: 2021-03-08

## 2021-03-08 VITALS — SYSTOLIC BLOOD PRESSURE: 120 MMHG | WEIGHT: 235 LBS | BODY MASS INDEX: 39.11 KG/M2 | DIASTOLIC BLOOD PRESSURE: 64 MMHG

## 2021-03-08 DIAGNOSIS — Z13.89 SCREENING FOR BLOOD OR PROTEIN IN URINE: ICD-10-CM

## 2021-03-08 DIAGNOSIS — Z3A.31 31 WEEKS GESTATION OF PREGNANCY: Primary | ICD-10-CM

## 2021-03-08 DIAGNOSIS — R63.8 INCREASED BMI: ICD-10-CM

## 2021-03-08 DIAGNOSIS — J45.909 ASTHMA, UNSPECIFIED ASTHMA SEVERITY, UNSPECIFIED WHETHER COMPLICATED, UNSPECIFIED WHETHER PERSISTENT: ICD-10-CM

## 2021-03-08 LAB
GLUCOSE UR STRIP-MCNC: NEGATIVE MG/DL
PROT UR STRIP-MCNC: NEGATIVE MG/DL

## 2021-03-08 PROCEDURE — 0502F SUBSEQUENT PRENATAL CARE: CPT | Performed by: NURSE PRACTITIONER

## 2021-03-08 NOTE — PROGRESS NOTES
Chief Complaint   Patient presents with   • Routine Prenatal Visit       HPI: 25 y.o.  at 31w0d     Pt presents today for routine prenatal follow up  Reports good fetal movements, denies LOF, bleeding or ctx's  She was seen in L&D on  for N/V and abdominal pain. Labs and NST were normal. Was told to see if symptoms were associated with food she was eating. Has since decreased fatty/greasy foods and has helped with her abdominal pain. Still having nausea and sometimes vomiting. Taking zofran as needed.  Recently saw asthma and allergy   Using Albuterol every 4-6 hours  Doing daily dulera   PFT's were normal at recent visit  Using nebulizer nightly   Pt of Dr. Garcia       Vitals:    21 0904   BP: 120/64   Weight: 107 kg (235 lb)       ROS:  GI:  Negative  : Negative  Pulmonary: Negative     A/P  1. Intrauterine pregnancy at 31w0d   2. Pregnancy Risk:  NORMAL    Diagnoses and all orders for this visit:    1. 31 weeks gestation of pregnancy (Primary)    2. Screening for blood or protein in urine  -     POC Urinalysis Dipstick    3. Increased BMI    4. Asthma, unspecified asthma severity, unspecified whether complicated, unspecified whether persistent        -----------------------  PLAN:   Rev'd s/s PTL, FKC, warning signs  Message to Dr. Garcia regarding asthma and possible adjustment of maintenance inhaler.  F/u 2 weeks with [facility]  3/8/2021 09:25 EST

## 2021-03-22 ENCOUNTER — ROUTINE PRENATAL (OUTPATIENT)
Dept: OBSTETRICS AND GYNECOLOGY | Age: 26
End: 2021-03-22

## 2021-03-22 DIAGNOSIS — Z34.03 ENCOUNTER FOR SUPERVISION OF NORMAL FIRST PREGNANCY IN THIRD TRIMESTER: Primary | ICD-10-CM

## 2021-03-22 DIAGNOSIS — J45.40 MODERATE PERSISTENT ASTHMA, UNSPECIFIED WHETHER COMPLICATED: ICD-10-CM

## 2021-03-22 PROBLEM — O44.22 PARTIAL PLACENTA PREVIA NOS OR WITHOUT HEMORRHAGE, SECOND TRIMESTER: Status: RESOLVED | Noted: 2020-12-22 | Resolved: 2021-03-22

## 2021-03-22 LAB
GLUCOSE UR STRIP-MCNC: NEGATIVE MG/DL
PROT UR STRIP-MCNC: NEGATIVE MG/DL

## 2021-03-22 PROCEDURE — 99213 OFFICE O/P EST LOW 20 MIN: CPT | Performed by: OBSTETRICS & GYNECOLOGY

## 2021-03-22 RX ORDER — MOMETASONE FUROATE AND FORMOTEROL FUMARATE DIHYDRATE 200; 5 UG/1; UG/1
AEROSOL RESPIRATORY (INHALATION)
COMMUNITY
Start: 2021-03-05 | End: 2022-11-26 | Stop reason: ALTCHOICE

## 2021-03-22 RX ORDER — MONTELUKAST SODIUM 10 MG/1
10 TABLET ORAL
COMMUNITY
Start: 2021-03-08 | End: 2022-06-20

## 2021-03-22 NOTE — PROGRESS NOTES
Chief Complaint   Patient presents with   • Routine Prenatal Visit     HPI- Pt is 25 y.o.  at 33w0d here for prenatal visit.     ROS-     - No vaginal bleeding    GI- No abdominal pain    LMP 2020 (Exact Date)   Exam - See flow sheet    Fetal heart rate is normal    Assessment-  Diagnoses and all orders for this visit:    Encounter for supervision of normal first pregnancy in third trimester  -     POC Urinalysis Dipstick    Moderate persistent asthma, unspecified whether complicated    Other orders  -     montelukast (SINGULAIR) 10 MG tablet; Take 10 mg by mouth every night at bedtime.  -     Dulera 200-5 MCG/ACT inhaler    I recommend Covid vaccination if okayed by her allergist  Continue with medications for her asthma and follow-up visits with Dr. Zayas  Discussion whether or not given her medical background she could tolerate a vaginal delivery, we will recheck ultrasound couple visits look at estimated fetal weight and placental location, I have asked her to discuss that with Dr. Zayas as well

## 2021-03-23 ENCOUNTER — PREP FOR SURGERY (OUTPATIENT)
Dept: OTHER | Facility: HOSPITAL | Age: 26
End: 2021-03-23

## 2021-03-23 DIAGNOSIS — J45.50 SEVERE PERSISTENT ASTHMA, UNSPECIFIED WHETHER COMPLICATED: Primary | ICD-10-CM

## 2021-03-23 RX ORDER — SODIUM CHLORIDE 0.9 % (FLUSH) 0.9 %
1-10 SYRINGE (ML) INJECTION AS NEEDED
Status: CANCELLED | OUTPATIENT
Start: 2021-03-23

## 2021-03-23 RX ORDER — MORPHINE SULFATE 2 MG/ML
2 INJECTION, SOLUTION INTRAMUSCULAR; INTRAVENOUS
Status: CANCELLED | OUTPATIENT
Start: 2021-03-23

## 2021-03-23 RX ORDER — OXYTOCIN-SODIUM CHLORIDE 0.9% IV SOLN 30 UNIT/500ML 30-0.9/5 UT/ML-%
250 SOLUTION INTRAVENOUS CONTINUOUS PRN
Status: CANCELLED | OUTPATIENT
Start: 2021-03-23 | End: 2021-03-23

## 2021-03-23 RX ORDER — ONDANSETRON 4 MG/1
4 TABLET, FILM COATED ORAL EVERY 6 HOURS PRN
Status: CANCELLED | OUTPATIENT
Start: 2021-03-23

## 2021-03-23 RX ORDER — SODIUM CHLORIDE, SODIUM LACTATE, POTASSIUM CHLORIDE, CALCIUM CHLORIDE 600; 310; 30; 20 MG/100ML; MG/100ML; MG/100ML; MG/100ML
125 INJECTION, SOLUTION INTRAVENOUS CONTINUOUS
Status: CANCELLED | OUTPATIENT
Start: 2021-03-23

## 2021-03-23 RX ORDER — OXYTOCIN-SODIUM CHLORIDE 0.9% IV SOLN 30 UNIT/500ML 30-0.9/5 UT/ML-%
999 SOLUTION INTRAVENOUS ONCE
Status: CANCELLED | OUTPATIENT
Start: 2021-03-23 | End: 2021-03-23

## 2021-03-23 RX ORDER — LIDOCAINE HYDROCHLORIDE 10 MG/ML
5 INJECTION, SOLUTION EPIDURAL; INFILTRATION; INTRACAUDAL; PERINEURAL AS NEEDED
Status: CANCELLED | OUTPATIENT
Start: 2021-03-23

## 2021-03-23 RX ORDER — CARBOPROST TROMETHAMINE 250 UG/ML
250 INJECTION, SOLUTION INTRAMUSCULAR AS NEEDED
Status: CANCELLED | OUTPATIENT
Start: 2021-03-23

## 2021-03-23 RX ORDER — SODIUM CHLORIDE 0.9 % (FLUSH) 0.9 %
3 SYRINGE (ML) INJECTION EVERY 12 HOURS SCHEDULED
Status: CANCELLED | OUTPATIENT
Start: 2021-03-23

## 2021-03-23 RX ORDER — ONDANSETRON 2 MG/ML
4 INJECTION INTRAMUSCULAR; INTRAVENOUS EVERY 6 HOURS PRN
Status: CANCELLED | OUTPATIENT
Start: 2021-03-23

## 2021-03-23 RX ORDER — OXYTOCIN-SODIUM CHLORIDE 0.9% IV SOLN 30 UNIT/500ML 30-0.9/5 UT/ML-%
125 SOLUTION INTRAVENOUS CONTINUOUS PRN
Status: CANCELLED | OUTPATIENT
Start: 2021-03-23

## 2021-03-23 RX ORDER — METHYLERGONOVINE MALEATE 0.2 MG/ML
200 INJECTION INTRAVENOUS AS NEEDED
Status: CANCELLED | OUTPATIENT
Start: 2021-03-23

## 2021-03-30 ENCOUNTER — IMMUNIZATION (OUTPATIENT)
Dept: VACCINE CLINIC | Facility: HOSPITAL | Age: 26
End: 2021-03-30

## 2021-03-30 PROCEDURE — 0001A: CPT | Performed by: OBSTETRICS & GYNECOLOGY

## 2021-03-30 PROCEDURE — 91300 HC SARSCOV02 VAC 30MCG/0.3ML IM: CPT | Performed by: OBSTETRICS & GYNECOLOGY

## 2021-04-05 ENCOUNTER — ROUTINE PRENATAL (OUTPATIENT)
Dept: OBSTETRICS AND GYNECOLOGY | Age: 26
End: 2021-04-05

## 2021-04-05 VITALS — SYSTOLIC BLOOD PRESSURE: 122 MMHG | DIASTOLIC BLOOD PRESSURE: 84 MMHG | BODY MASS INDEX: 39.94 KG/M2 | WEIGHT: 240 LBS

## 2021-04-05 DIAGNOSIS — Z13.89 SCREENING FOR BLOOD OR PROTEIN IN URINE: ICD-10-CM

## 2021-04-05 DIAGNOSIS — Z3A.35 35 WEEKS GESTATION OF PREGNANCY: Primary | ICD-10-CM

## 2021-04-05 DIAGNOSIS — J45.40 MODERATE PERSISTENT ASTHMA, UNSPECIFIED WHETHER COMPLICATED: ICD-10-CM

## 2021-04-05 DIAGNOSIS — R63.8 INCREASED BMI: ICD-10-CM

## 2021-04-05 LAB
GLUCOSE UR STRIP-MCNC: NEGATIVE MG/DL
PROT UR STRIP-MCNC: NEGATIVE MG/DL

## 2021-04-05 PROCEDURE — 0502F SUBSEQUENT PRENATAL CARE: CPT | Performed by: NURSE PRACTITIONER

## 2021-04-12 ENCOUNTER — ROUTINE PRENATAL (OUTPATIENT)
Dept: OBSTETRICS AND GYNECOLOGY | Age: 26
End: 2021-04-12

## 2021-04-12 VITALS — SYSTOLIC BLOOD PRESSURE: 136 MMHG | WEIGHT: 244 LBS | BODY MASS INDEX: 40.6 KG/M2 | DIASTOLIC BLOOD PRESSURE: 76 MMHG

## 2021-04-12 DIAGNOSIS — Z34.03 ENCOUNTER FOR SUPERVISION OF NORMAL FIRST PREGNANCY IN THIRD TRIMESTER: Primary | ICD-10-CM

## 2021-04-12 DIAGNOSIS — R10.9 ABDOMINAL PAIN AFFECTING PREGNANCY, ANTEPARTUM: ICD-10-CM

## 2021-04-12 DIAGNOSIS — O26.899 ABDOMINAL PAIN AFFECTING PREGNANCY, ANTEPARTUM: ICD-10-CM

## 2021-04-12 LAB
GLUCOSE UR STRIP-MCNC: NEGATIVE MG/DL
PROT UR STRIP-MCNC: NEGATIVE MG/DL

## 2021-04-12 PROCEDURE — 0502F SUBSEQUENT PRENATAL CARE: CPT | Performed by: OBSTETRICS & GYNECOLOGY

## 2021-04-14 LAB — GP B STREP DNA SPEC QL NAA+PROBE: POSITIVE

## 2021-04-20 ENCOUNTER — IMMUNIZATION (OUTPATIENT)
Dept: VACCINE CLINIC | Facility: HOSPITAL | Age: 26
End: 2021-04-20

## 2021-04-20 ENCOUNTER — ROUTINE PRENATAL (OUTPATIENT)
Dept: OBSTETRICS AND GYNECOLOGY | Age: 26
End: 2021-04-20

## 2021-04-20 VITALS — DIASTOLIC BLOOD PRESSURE: 80 MMHG | SYSTOLIC BLOOD PRESSURE: 136 MMHG | BODY MASS INDEX: 40.77 KG/M2 | WEIGHT: 245 LBS

## 2021-04-20 DIAGNOSIS — R10.9 ABDOMINAL PAIN AFFECTING PREGNANCY, ANTEPARTUM: ICD-10-CM

## 2021-04-20 DIAGNOSIS — O26.899 ABDOMINAL PAIN AFFECTING PREGNANCY, ANTEPARTUM: ICD-10-CM

## 2021-04-20 DIAGNOSIS — Z34.03 ENCOUNTER FOR SUPERVISION OF NORMAL FIRST PREGNANCY IN THIRD TRIMESTER: Primary | ICD-10-CM

## 2021-04-20 LAB
GLUCOSE UR STRIP-MCNC: NEGATIVE MG/DL
PROT UR STRIP-MCNC: NEGATIVE MG/DL

## 2021-04-20 PROCEDURE — 0002A: CPT | Performed by: OBSTETRICS & GYNECOLOGY

## 2021-04-20 PROCEDURE — 0502F SUBSEQUENT PRENATAL CARE: CPT | Performed by: OBSTETRICS & GYNECOLOGY

## 2021-04-20 PROCEDURE — 91300 HC SARSCOV02 VAC 30MCG/0.3ML IM: CPT | Performed by: OBSTETRICS & GYNECOLOGY

## 2021-04-20 NOTE — PROGRESS NOTES
Chief Complaint   Patient presents with   • Routine Prenatal Visit     HPI- Pt is 25 y.o.  at 37w1d here for prenatal visit.     ROS-     - No vaginal bleeding    GI- No abdominal pain    /80   Wt 111 kg (245 lb)   LMP 2020 (Exact Date)   BMI 40.77 kg/m²   Exam - See flow sheet    Fetal heart rate is normal    Assessment-  Diagnoses and all orders for this visit:    Encounter for supervision of normal first pregnancy in third trimester  -     POC Urinalysis Dipstick    Abdominal pain affecting pregnancy, antepartum  -     POC Urinalysis Dipstick    Abdominal pain much improved  Lungs have mild wheezing but she is moving air nicely and reports she feels stable we will continue current asthma medications  GBS positive  Patient still wants to proceed with elective primary  on 4 May

## 2021-04-26 ENCOUNTER — ROUTINE PRENATAL (OUTPATIENT)
Dept: OBSTETRICS AND GYNECOLOGY | Age: 26
End: 2021-04-26

## 2021-04-26 VITALS — WEIGHT: 247.8 LBS | DIASTOLIC BLOOD PRESSURE: 66 MMHG | SYSTOLIC BLOOD PRESSURE: 110 MMHG | BODY MASS INDEX: 41.24 KG/M2

## 2021-04-26 DIAGNOSIS — R10.9 ABDOMINAL PAIN AFFECTING PREGNANCY, ANTEPARTUM: ICD-10-CM

## 2021-04-26 DIAGNOSIS — Z3A.38 38 WEEKS GESTATION OF PREGNANCY: Primary | ICD-10-CM

## 2021-04-26 DIAGNOSIS — O26.899 ABDOMINAL PAIN AFFECTING PREGNANCY, ANTEPARTUM: ICD-10-CM

## 2021-04-26 LAB
CLARITY, POC: CLEAR
COLOR UR: YELLOW
GLUCOSE UR STRIP-MCNC: NEGATIVE MG/DL
PROT UR STRIP-MCNC: NEGATIVE MG/DL

## 2021-04-26 PROCEDURE — 0502F SUBSEQUENT PRENATAL CARE: CPT | Performed by: OBSTETRICS & GYNECOLOGY

## 2021-04-26 NOTE — PROGRESS NOTES
Size greater than dates  Fetal heart tones normal  Signs symptoms of labor reviewed cervix is unchanged, GBS positive

## 2021-04-29 ENCOUNTER — TELEPHONE (OUTPATIENT)
Dept: OBSTETRICS AND GYNECOLOGY | Age: 26
End: 2021-04-29

## 2021-04-29 ENCOUNTER — HOSPITAL ENCOUNTER (OUTPATIENT)
Facility: HOSPITAL | Age: 26
Setting detail: OBSERVATION
Discharge: HOME | End: 2021-05-01
Attending: OBSTETRICS & GYNECOLOGY | Admitting: OBSTETRICS & GYNECOLOGY

## 2021-04-29 ENCOUNTER — ROUTINE PRENATAL (OUTPATIENT)
Dept: OBSTETRICS AND GYNECOLOGY | Age: 26
End: 2021-04-29

## 2021-04-29 VITALS — SYSTOLIC BLOOD PRESSURE: 130 MMHG | BODY MASS INDEX: 40.94 KG/M2 | DIASTOLIC BLOOD PRESSURE: 82 MMHG | WEIGHT: 246 LBS

## 2021-04-29 DIAGNOSIS — Z13.89 SCREENING FOR BLOOD OR PROTEIN IN URINE: ICD-10-CM

## 2021-04-29 DIAGNOSIS — Z3A.38 38 WEEKS GESTATION OF PREGNANCY: Primary | ICD-10-CM

## 2021-04-29 DIAGNOSIS — R60.0 BILATERAL LOWER EXTREMITY EDEMA: ICD-10-CM

## 2021-04-29 LAB
ALBUMIN SERPL-MCNC: 3.2 G/DL (ref 3.5–5.2)
ALBUMIN/GLOB SERPL: 1 G/DL
ALP SERPL-CCNC: 127 U/L (ref 39–117)
ALT SERPL W P-5'-P-CCNC: 19 U/L (ref 1–33)
ANION GAP SERPL CALCULATED.3IONS-SCNC: 11.2 MMOL/L (ref 5–15)
AST SERPL-CCNC: 16 U/L (ref 1–32)
BASOPHILS # BLD AUTO: 0.06 10*3/MM3 (ref 0–0.2)
BASOPHILS NFR BLD AUTO: 0.6 % (ref 0–1.5)
BILIRUB SERPL-MCNC: 0.2 MG/DL (ref 0–1.2)
BUN SERPL-MCNC: 4 MG/DL (ref 6–20)
BUN/CREAT SERPL: 6.3 (ref 7–25)
CALCIUM SPEC-SCNC: 9 MG/DL (ref 8.6–10.5)
CHLORIDE SERPL-SCNC: 105 MMOL/L (ref 98–107)
CO2 SERPL-SCNC: 20.8 MMOL/L (ref 22–29)
CREAT SERPL-MCNC: 0.63 MG/DL (ref 0.57–1)
CREAT UR-MCNC: 34.9 MG/DL
DEPRECATED RDW RBC AUTO: 39.4 FL (ref 37–54)
EOSINOPHIL # BLD AUTO: 0.32 10*3/MM3 (ref 0–0.4)
EOSINOPHIL NFR BLD AUTO: 3.1 % (ref 0.3–6.2)
ERYTHROCYTE [DISTWIDTH] IN BLOOD BY AUTOMATED COUNT: 13.7 % (ref 12.3–15.4)
GFR SERPL CREATININE-BSD FRML MDRD: 114 ML/MIN/1.73
GLOBULIN UR ELPH-MCNC: 3.1 GM/DL
GLUCOSE SERPL-MCNC: 97 MG/DL (ref 65–99)
GLUCOSE UR STRIP-MCNC: NEGATIVE MG/DL
HCT VFR BLD AUTO: 33.7 % (ref 34–46.6)
HGB BLD-MCNC: 11.3 G/DL (ref 12–15.9)
IMM GRANULOCYTES # BLD AUTO: 0.14 10*3/MM3 (ref 0–0.05)
IMM GRANULOCYTES NFR BLD AUTO: 1.3 % (ref 0–0.5)
LYMPHOCYTES # BLD AUTO: 2.05 10*3/MM3 (ref 0.7–3.1)
LYMPHOCYTES NFR BLD AUTO: 19.6 % (ref 19.6–45.3)
MCH RBC QN AUTO: 27.2 PG (ref 26.6–33)
MCHC RBC AUTO-ENTMCNC: 33.5 G/DL (ref 31.5–35.7)
MCV RBC AUTO: 81 FL (ref 79–97)
MONOCYTES # BLD AUTO: 0.84 10*3/MM3 (ref 0.1–0.9)
MONOCYTES NFR BLD AUTO: 8 % (ref 5–12)
NEUTROPHILS NFR BLD AUTO: 67.4 % (ref 42.7–76)
NEUTROPHILS NFR BLD AUTO: 7.06 10*3/MM3 (ref 1.7–7)
NRBC BLD AUTO-RTO: 0 /100 WBC (ref 0–0.2)
PLATELET # BLD AUTO: 303 10*3/MM3 (ref 140–450)
PMV BLD AUTO: 10.5 FL (ref 6–12)
POTASSIUM SERPL-SCNC: 3.7 MMOL/L (ref 3.5–5.2)
PROT SERPL-MCNC: 6.3 G/DL (ref 6–8.5)
PROT UR STRIP-MCNC: NEGATIVE MG/DL
PROT UR-MCNC: 4 MG/DL
PROT/CREAT UR: 114.6 MG/G CREA (ref 0–200)
RBC # BLD AUTO: 4.16 10*6/MM3 (ref 3.77–5.28)
SARS-COV-2 RNA RESP QL NAA+PROBE: NOT DETECTED
SODIUM SERPL-SCNC: 137 MMOL/L (ref 136–145)
WBC # BLD AUTO: 10.47 10*3/MM3 (ref 3.4–10.8)

## 2021-04-29 PROCEDURE — 85025 COMPLETE CBC W/AUTO DIFF WBC: CPT | Performed by: OBSTETRICS & GYNECOLOGY

## 2021-04-29 PROCEDURE — 84156 ASSAY OF PROTEIN URINE: CPT | Performed by: OBSTETRICS & GYNECOLOGY

## 2021-04-29 PROCEDURE — 99283 EMERGENCY DEPT VISIT LOW MDM: CPT | Performed by: OBSTETRICS & GYNECOLOGY

## 2021-04-29 PROCEDURE — C9803 HOPD COVID-19 SPEC COLLECT: HCPCS

## 2021-04-29 PROCEDURE — U0003 INFECTIOUS AGENT DETECTION BY NUCLEIC ACID (DNA OR RNA); SEVERE ACUTE RESPIRATORY SYNDROME CORONAVIRUS 2 (SARS-COV-2) (CORONAVIRUS DISEASE [COVID-19]), AMPLIFIED PROBE TECHNIQUE, MAKING USE OF HIGH THROUGHPUT TECHNOLOGIES AS DESCRIBED BY CMS-2020-01-R: HCPCS | Performed by: OBSTETRICS & GYNECOLOGY

## 2021-04-29 PROCEDURE — 99283 EMERGENCY DEPT VISIT LOW MDM: CPT

## 2021-04-29 PROCEDURE — 82570 ASSAY OF URINE CREATININE: CPT | Performed by: OBSTETRICS & GYNECOLOGY

## 2021-04-29 PROCEDURE — 0502F SUBSEQUENT PRENATAL CARE: CPT | Performed by: NURSE PRACTITIONER

## 2021-04-29 PROCEDURE — 59025 FETAL NON-STRESS TEST: CPT

## 2021-04-29 PROCEDURE — 80053 COMPREHEN METABOLIC PANEL: CPT | Performed by: OBSTETRICS & GYNECOLOGY

## 2021-04-29 RX ORDER — SODIUM CHLORIDE 0.9 % (FLUSH) 0.9 %
10 SYRINGE (ML) INJECTION EVERY 12 HOURS SCHEDULED
Status: DISCONTINUED | OUTPATIENT
Start: 2021-04-29 | End: 2021-05-01 | Stop reason: HOSPADM

## 2021-04-29 RX ORDER — SODIUM CHLORIDE 0.9 % (FLUSH) 0.9 %
10 SYRINGE (ML) INJECTION AS NEEDED
Status: DISCONTINUED | OUTPATIENT
Start: 2021-04-29 | End: 2021-05-01 | Stop reason: HOSPADM

## 2021-04-29 RX ORDER — SODIUM CHLORIDE, SODIUM LACTATE, POTASSIUM CHLORIDE, CALCIUM CHLORIDE 600; 310; 30; 20 MG/100ML; MG/100ML; MG/100ML; MG/100ML
50 INJECTION, SOLUTION INTRAVENOUS CONTINUOUS
Status: DISCONTINUED | OUTPATIENT
Start: 2021-04-29 | End: 2021-04-30

## 2021-04-29 RX ORDER — ACETAMINOPHEN 500 MG
1000 TABLET ORAL ONCE
Status: COMPLETED | OUTPATIENT
Start: 2021-04-29 | End: 2021-04-29

## 2021-04-29 RX ADMIN — ACETAMINOPHEN 1000 MG: 500 TABLET, FILM COATED ORAL at 20:22

## 2021-04-29 NOTE — TELEPHONE ENCOUNTER
I s/w pt and offered eval at L&D given her gestation and her last 2 BP's that appeared to be trending up. She would prefer w/u in office instead. Transferred to [facility] to schedule with me or Amira

## 2021-04-29 NOTE — TELEPHONE ENCOUNTER
Link pt    Pt called stating that she will have her baby on Tuesday. Pt stated that her feet and ankles are swollen she stated that they have been swollen since this past Tuesday. Please advise    [phone]

## 2021-04-29 NOTE — PROGRESS NOTES
Pt here today for add on chief complaint of LE edema x 4 days  Systolic blood pressure today slightly elevated. Repeat 124/80. Urine protein negative.  She denies any HA, visual changes, or RUQ pain.  She reports very good fetal movement, denies LOF, bleeding or ctx's  She does not have a BP cuff but states she will obtain one on the way home to monitor her blood pressure at home.  Planned repeat C/S on 5/4  I reviewed s/s pre-eclampsia, FKC, warning signs. If any occur or swelling worsens she should go to L&D, she v/u.  Pt of Dr. Garcia

## 2021-04-29 NOTE — TELEPHONE ENCOUNTER
Link pt      fyi  Pt called back after appointment and stated that her BP was 143/89 then 155/89. Pt was already advised to go to L&D

## 2021-04-30 PROBLEM — Z34.90 PREGNANCY: Status: ACTIVE | Noted: 2021-04-30

## 2021-04-30 LAB
ABO GROUP BLD: NORMAL
ALBUMIN SERPL-MCNC: 3.2 G/DL (ref 3.5–5.2)
ALBUMIN/GLOB SERPL: 1.1 G/DL
ALP SERPL-CCNC: 129 U/L (ref 39–117)
ALT SERPL W P-5'-P-CCNC: 16 U/L (ref 1–33)
ANION GAP SERPL CALCULATED.3IONS-SCNC: 10.6 MMOL/L (ref 5–15)
AST SERPL-CCNC: 20 U/L (ref 1–32)
BASOPHILS # BLD AUTO: 0.05 10*3/MM3 (ref 0–0.2)
BASOPHILS NFR BLD AUTO: 0.6 % (ref 0–1.5)
BILIRUB SERPL-MCNC: 0.2 MG/DL (ref 0–1.2)
BLD GP AB SCN SERPL QL: NEGATIVE
BUN SERPL-MCNC: 4 MG/DL (ref 6–20)
BUN/CREAT SERPL: 7.4 (ref 7–25)
CALCIUM SPEC-SCNC: 8.9 MG/DL (ref 8.6–10.5)
CHLORIDE SERPL-SCNC: 107 MMOL/L (ref 98–107)
CO2 SERPL-SCNC: 19.4 MMOL/L (ref 22–29)
CREAT SERPL-MCNC: 0.54 MG/DL (ref 0.57–1)
DEPRECATED RDW RBC AUTO: 39.9 FL (ref 37–54)
EOSINOPHIL # BLD AUTO: 0.33 10*3/MM3 (ref 0–0.4)
EOSINOPHIL NFR BLD AUTO: 3.8 % (ref 0.3–6.2)
ERYTHROCYTE [DISTWIDTH] IN BLOOD BY AUTOMATED COUNT: 13.6 % (ref 12.3–15.4)
GFR SERPL CREATININE-BSD FRML MDRD: 136 ML/MIN/1.73
GLOBULIN UR ELPH-MCNC: 3 GM/DL
GLUCOSE SERPL-MCNC: 76 MG/DL (ref 65–99)
HCT VFR BLD AUTO: 34 % (ref 34–46.6)
HGB BLD-MCNC: 11.5 G/DL (ref 12–15.9)
IMM GRANULOCYTES # BLD AUTO: 0.08 10*3/MM3 (ref 0–0.05)
IMM GRANULOCYTES NFR BLD AUTO: 0.9 % (ref 0–0.5)
LYMPHOCYTES # BLD AUTO: 1.58 10*3/MM3 (ref 0.7–3.1)
LYMPHOCYTES NFR BLD AUTO: 18.2 % (ref 19.6–45.3)
MCH RBC QN AUTO: 27.6 PG (ref 26.6–33)
MCHC RBC AUTO-ENTMCNC: 33.8 G/DL (ref 31.5–35.7)
MCV RBC AUTO: 81.5 FL (ref 79–97)
MONOCYTES # BLD AUTO: 0.59 10*3/MM3 (ref 0.1–0.9)
MONOCYTES NFR BLD AUTO: 6.8 % (ref 5–12)
NEUTROPHILS NFR BLD AUTO: 6.07 10*3/MM3 (ref 1.7–7)
NEUTROPHILS NFR BLD AUTO: 69.7 % (ref 42.7–76)
NRBC BLD AUTO-RTO: 0 /100 WBC (ref 0–0.2)
PLATELET # BLD AUTO: 301 10*3/MM3 (ref 140–450)
PMV BLD AUTO: 10.6 FL (ref 6–12)
POTASSIUM SERPL-SCNC: 3.9 MMOL/L (ref 3.5–5.2)
PROT 24H UR-MRATE: 216 MG/24HOURS (ref 0–150)
PROT SERPL-MCNC: 6.2 G/DL (ref 6–8.5)
RBC # BLD AUTO: 4.17 10*6/MM3 (ref 3.77–5.28)
RH BLD: POSITIVE
SODIUM SERPL-SCNC: 137 MMOL/L (ref 136–145)
T&S EXPIRATION DATE: NORMAL
WBC # BLD AUTO: 8.7 10*3/MM3 (ref 3.4–10.8)

## 2021-04-30 PROCEDURE — G0378 HOSPITAL OBSERVATION PER HR: HCPCS

## 2021-04-30 PROCEDURE — 86850 RBC ANTIBODY SCREEN: CPT | Performed by: OBSTETRICS & GYNECOLOGY

## 2021-04-30 PROCEDURE — 99224 PR SBSQ OBSERVATION CARE/DAY 15 MINUTES: CPT | Performed by: OBSTETRICS & GYNECOLOGY

## 2021-04-30 PROCEDURE — 85025 COMPLETE CBC W/AUTO DIFF WBC: CPT | Performed by: OBSTETRICS & GYNECOLOGY

## 2021-04-30 PROCEDURE — 59025 FETAL NON-STRESS TEST: CPT

## 2021-04-30 PROCEDURE — 59025 FETAL NON-STRESS TEST: CPT | Performed by: OBSTETRICS & GYNECOLOGY

## 2021-04-30 PROCEDURE — 84156 ASSAY OF PROTEIN URINE: CPT | Performed by: OBSTETRICS & GYNECOLOGY

## 2021-04-30 PROCEDURE — 81050 URINALYSIS VOLUME MEASURE: CPT | Performed by: OBSTETRICS & GYNECOLOGY

## 2021-04-30 PROCEDURE — 80053 COMPREHEN METABOLIC PANEL: CPT | Performed by: OBSTETRICS & GYNECOLOGY

## 2021-04-30 PROCEDURE — 86900 BLOOD TYPING SEROLOGIC ABO: CPT | Performed by: OBSTETRICS & GYNECOLOGY

## 2021-04-30 PROCEDURE — 86901 BLOOD TYPING SEROLOGIC RH(D): CPT | Performed by: OBSTETRICS & GYNECOLOGY

## 2021-04-30 RX ORDER — ACETAMINOPHEN 325 MG/1
325 TABLET ORAL ONCE
Status: COMPLETED | OUTPATIENT
Start: 2021-04-30 | End: 2021-04-30

## 2021-04-30 RX ADMIN — ACETAMINOPHEN 325 MG: 325 TABLET ORAL at 10:01

## 2021-04-30 RX ADMIN — SODIUM CHLORIDE, PRESERVATIVE FREE 10 ML: 5 INJECTION INTRAVENOUS at 23:01

## 2021-04-30 RX ADMIN — SODIUM CHLORIDE, PRESERVATIVE FREE 10 ML: 5 INJECTION INTRAVENOUS at 01:16

## 2021-04-30 RX ADMIN — SODIUM CHLORIDE, PRESERVATIVE FREE 10 ML: 5 INJECTION INTRAVENOUS at 09:29

## 2021-05-01 VITALS
OXYGEN SATURATION: 99 % | TEMPERATURE: 98.1 F | DIASTOLIC BLOOD PRESSURE: 87 MMHG | RESPIRATION RATE: 16 BRPM | WEIGHT: 249.2 LBS | SYSTOLIC BLOOD PRESSURE: 127 MMHG | HEIGHT: 65 IN | BODY MASS INDEX: 41.52 KG/M2 | HEART RATE: 89 BPM

## 2021-05-01 PROBLEM — O16.9 ELEVATED BLOOD PRESSURE AFFECTING PREGNANCY, ANTEPARTUM: Status: ACTIVE | Noted: 2021-05-01

## 2021-05-01 PROCEDURE — G0378 HOSPITAL OBSERVATION PER HR: HCPCS

## 2021-05-01 PROCEDURE — 59025 FETAL NON-STRESS TEST: CPT | Performed by: OBSTETRICS & GYNECOLOGY

## 2021-05-01 PROCEDURE — 59025 FETAL NON-STRESS TEST: CPT

## 2021-05-01 PROCEDURE — 99217 PR OBSERVATION CARE DISCHARGE MANAGEMENT: CPT | Performed by: OBSTETRICS & GYNECOLOGY

## 2021-05-01 NOTE — DISCHARGE SUMMARY
ANTEPARTUM discharge note    Admission date 2021     Patient: Cesia Ambrsoe MRN: [id number]   YOB: 1995 Age: 25 y.o. Sex: female     Chief Complaint   Patient presents with   • Elevated Blood Pressure     Pt to [facility] with c/o elevated BP at home of 155/93.  Pt states has had swelling in lower extremities and is c/o HA       HPI:    Cesia Ambrose is a 25 y.o.,  AT 38w5d admitted for evaluation of possible gestational hypertension.  Patient called with reports of some increase in swelling and elevated blood pressures..  Blood pressures overall have been mostly reassuring since admission to the hospital.  Headache has improved, 24-hour urine for total protein did not indicate preeclampsia .  Platelet count was normal at 303.  Liver enzymes were normal. denies vaginal bleeding, leakage of fluid, or contractions. Admits to good fetal movement.  Fetal heart tones have been reassuring.  Patient feels well and is requesting discharge.    Patient Active Problem List   Diagnosis   • Migraine without aura and with status migrainosus, not intractable   • Pre-conception counseling   • Family history of breast cancer   • BRCA negative   • Gastroesophageal reflux disease without esophagitis   • Increased BMI   • Abdominal pain affecting pregnancy, antepartum   • Asthma   • Severe persistent asthma, unspecified whether complicated   • Pregnancy   • Elevated blood pressure affecting pregnancy, antepartum     OB History    Para Term  AB Living   1 0 0 0 0 0   SAB TAB Ectopic Molar Multiple Live Births   0 0 0 0 0 0      # Outcome Date GA Lbr [place]/2nd Weight Sex Delivery Anes PTL Lv   1 Current              Past Medical History:   Diagnosis Date   • Anemia    • Anxiety    • Asthma    • Environmental allergies    • GERD (gastroesophageal reflux disease)    • Migraine    • OCD (obsessive compulsive disorder)      Past Surgical History:   Procedure Laterality Date   • CYST REMOVAL     • NASAL SEPTUM  SURGERY     • WISDOM TOOTH EXTRACTION       No current facility-administered medications on file prior to encounter.     Current Outpatient Medications on File Prior to Encounter   Medication Sig Dispense Refill   • albuterol (PROVENTIL) (2.5 MG/3ML) 0.083% nebulizer solution U 1 UNIT VIA NEB QID PRN     • albuterol sulfate  (90 Base) MCG/ACT inhaler INHALE 2 PUFFS QID PRF SOB     • cetirizine (zyrTEC) 10 MG tablet Take 10 mg by mouth Daily.     • Dulera 200-5 MCG/ACT inhaler      • Ferrous Sulfate Dried (Feosol) 200 (65 Fe) MG tablet tablet Take  by mouth 3 (Three) Times a Day With Meals.     • mometasone-formoterol (DULERA 100) 100-5 MCG/ACT inhaler Inhale 2 puffs 2 (Two) Times a Day.     • montelukast (SINGULAIR) 10 MG tablet Take 10 mg by mouth every night at bedtime.     • ondansetron ODT (ZOFRAN-ODT) 4 MG disintegrating tablet TAKE 1 TABLET BY MOUTH EVERY 12 HOURS AS NEEDED FOR NAUSEA OR VOMITING 30 tablet 2   • prenatal vitamin (prenatal, CLASSIC, vitamin) tablet Take  by mouth Daily.     • Nebulizers (Vios Aerosol Delivery System) misc U UTD         ROS:      Except as outlined in history of physical illness, patient denies any changes in her GYN, , GI systems. All other systems reviewed are negative.      OBJECTIVE:     Vitals:   Vitals:    04/30/21 1955 04/30/21 2300 05/01/21 0300 05/01/21 1035   BP:  131/82 127/80 127/87   BP Location:  Right arm Right arm Right arm   Patient Position:  Lying Sitting Sitting   Pulse: 95 90 94 89   Resp:    16   Temp:    98.1 °F (36.7 °C)   TempSrc:    Oral   SpO2: 99%   99%   Weight:       Height:             Appearance/Psychiatric: In no distress   Constitutional: The patient is well nourished   Cardiovascular: She does not have edema. Heart RRR  Respiratory: Respiratory effort is normal. CTAB   Abdomen: Soft, gravid.  Ext: nontender, no edema. +2/4 bilateral patellar reflexes   Cx; deferred       LOS: 0 days    Chavez Garcia MD   May 1, 2021    Assessment and  Plan:   Severe persistent asthma, unspecified whether complicated [J45.50]  Pregnancy [Z34.90]  38-week 5-day intrauterine pregnancy  Was admitted to rule out preeclampsia and as outlined above, testing was reassuring.  Physical exam today was reassuring, fetal heart tones reassuring, patient requesting discharge we will allow her to go home, blood pressure parameters discussed, have asked patient to call with any change in her condition if she stays stable we will see her back in hospital on Tuesday as scheduled  History of persistent severe asthma

## 2021-05-01 NOTE — NON STRESS TEST
Cesia Ambrose, a  at 38w4d with an RUTHY of 5/10/2021, by Last Menstrual Period, was seen at [facility] ANTEPARTUM UNIT for a nonstress test.    Chief Complaint   Patient presents with   • Elevated Blood Pressure     Pt to [facility] with c/o elevated BP at home of 155/93.  Pt states has had swelling in lower extremities and is c/o HA       Patient Active Problem List   Diagnosis   • Migraine without aura and with status migrainosus, not intractable   • Pre-conception counseling   • Family history of breast cancer   • BRCA negative   • Gastroesophageal reflux disease without esophagitis   • Increased BMI   • Abdominal pain affecting pregnancy, antepartum   • Asthma   • Severe persistent asthma, unspecified whether complicated   • Pregnancy       Start Time:   Stop Time:     Interpretation A  Nonstress Test Interpretation A: Reactive (21 : Soumya Blas, RN)

## 2021-05-01 NOTE — PLAN OF CARE
Goal Outcome Evaluation:        Outcome Summary: RNST; no VB, no LOF, rare UCs, +FM; pt. does have a dull HA that gets better with Tylenol; does have n/v as a normal occurence for her this pregnancy, but has no epigastric pain; 24 hour urine below 300; Dr. Garcia in to see her today, and has d/c her to home to rest and wait for  scheduled on .

## 2021-05-01 NOTE — PLAN OF CARE
Problem: Adult Inpatient Plan of Care  Goal: Plan of Care Review  Outcome: Ongoing, Progressing  Flowsheets (Taken 5/1/2021 5197)  Progress: no change  Plan of Care Reviewed With:   patient   spouse  Outcome Summary:   RNST   +FM. Denies VB, LOF. Irregular ctx. Dull [place]LL[place]. Denies epigastric pain. Spots seen while in shower, otherwise no vision issues. 24 urine completed. NPO after midnight if c/s 5/1.   Goal Outcome Evaluation:  Plan of Care Reviewed With: patient, spouse  Progress: no change  Outcome Summary: RNST; +FM. Denies VB, LOF. Irregular ctx. Dull ELL[place]. Denies epigastric pain. Spots seen while in shower, otherwise no vision issues. 24 urine completed. NPO after midnight if c/s 5/1.

## 2021-05-03 ENCOUNTER — ROUTINE PRENATAL (OUTPATIENT)
Dept: OBSTETRICS AND GYNECOLOGY | Age: 26
End: 2021-05-03

## 2021-05-03 VITALS — BODY MASS INDEX: 40.27 KG/M2 | DIASTOLIC BLOOD PRESSURE: 88 MMHG | WEIGHT: 242 LBS | SYSTOLIC BLOOD PRESSURE: 150 MMHG

## 2021-05-03 DIAGNOSIS — Z34.03 ENCOUNTER FOR SUPERVISION OF NORMAL FIRST PREGNANCY IN THIRD TRIMESTER: Primary | ICD-10-CM

## 2021-05-03 DIAGNOSIS — Z3A.35 35 WEEKS GESTATION OF PREGNANCY: ICD-10-CM

## 2021-05-03 DIAGNOSIS — R10.9 ABDOMINAL PAIN AFFECTING PREGNANCY, ANTEPARTUM: ICD-10-CM

## 2021-05-03 DIAGNOSIS — Z3A.35 35 WEEKS GESTATION OF PREGNANCY: Primary | ICD-10-CM

## 2021-05-03 DIAGNOSIS — O26.899 ABDOMINAL PAIN AFFECTING PREGNANCY, ANTEPARTUM: ICD-10-CM

## 2021-05-03 LAB
GLUCOSE UR STRIP-MCNC: NEGATIVE MG/DL
PROT UR STRIP-MCNC: NEGATIVE MG/DL

## 2021-05-03 PROCEDURE — 0502F SUBSEQUENT PRENATAL CARE: CPT | Performed by: OBSTETRICS & GYNECOLOGY

## 2021-05-04 ENCOUNTER — ANESTHESIA EVENT (OUTPATIENT)
Dept: LABOR AND DELIVERY | Facility: HOSPITAL | Age: 26
End: 2021-05-04

## 2021-05-04 ENCOUNTER — ANESTHESIA (OUTPATIENT)
Dept: LABOR AND DELIVERY | Facility: HOSPITAL | Age: 26
End: 2021-05-04

## 2021-05-04 ENCOUNTER — HOSPITAL ENCOUNTER (INPATIENT)
Facility: HOSPITAL | Age: 26
LOS: 2 days | Discharge: HOME | End: 2021-05-06
Attending: OBSTETRICS & GYNECOLOGY | Admitting: OBSTETRICS & GYNECOLOGY

## 2021-05-04 DIAGNOSIS — J45.50 SEVERE PERSISTENT ASTHMA, UNSPECIFIED WHETHER COMPLICATED: ICD-10-CM

## 2021-05-04 PROBLEM — O99.519 ASTHMA COMPLICATING PREGNANCY, ANTEPARTUM: Status: ACTIVE | Noted: 2021-05-04

## 2021-05-04 PROBLEM — J45.909 ASTHMA COMPLICATING PREGNANCY, ANTEPARTUM: Status: ACTIVE | Noted: 2021-05-04

## 2021-05-04 LAB
ABO GROUP BLD: NORMAL
BASOPHILS # BLD AUTO: 0.09 10*3/MM3 (ref 0–0.2)
BASOPHILS NFR BLD AUTO: 0.9 % (ref 0–1.5)
BLD GP AB SCN SERPL QL: NEGATIVE
BLD GP AB SCN SERPL QL: NEGATIVE
DEPRECATED RDW RBC AUTO: 39.2 FL (ref 37–54)
EOSINOPHIL # BLD AUTO: 0.39 10*3/MM3 (ref 0–0.4)
EOSINOPHIL NFR BLD AUTO: 3.7 % (ref 0.3–6.2)
ERYTHROCYTE [DISTWIDTH] IN BLOOD BY AUTOMATED COUNT: 13.5 % (ref 12.3–15.4)
HBV SURFACE AG SERPL QL IA: NEGATIVE
HCT VFR BLD AUTO: 34.9 % (ref 34–46.6)
HGB BLD-MCNC: 11.8 G/DL (ref 12–15.9)
HIV 1+2 AB+HIV1 P24 AG SERPL QL IA: NON REACTIVE
IMM GRANULOCYTES # BLD AUTO: 0.11 10*3/MM3 (ref 0–0.05)
IMM GRANULOCYTES NFR BLD AUTO: 1 % (ref 0–0.5)
LYMPHOCYTES # BLD AUTO: 2.06 10*3/MM3 (ref 0.7–3.1)
LYMPHOCYTES NFR BLD AUTO: 19.5 % (ref 19.6–45.3)
MCH RBC QN AUTO: 27.3 PG (ref 26.6–33)
MCHC RBC AUTO-ENTMCNC: 33.8 G/DL (ref 31.5–35.7)
MCV RBC AUTO: 80.8 FL (ref 79–97)
MONOCYTES # BLD AUTO: 0.83 10*3/MM3 (ref 0.1–0.9)
MONOCYTES NFR BLD AUTO: 7.9 % (ref 5–12)
NEUTROPHILS NFR BLD AUTO: 67 % (ref 42.7–76)
NEUTROPHILS NFR BLD AUTO: 7.08 10*3/MM3 (ref 1.7–7)
NRBC BLD AUTO-RTO: 0 /100 WBC (ref 0–0.2)
PLATELET # BLD AUTO: 296 10*3/MM3 (ref 140–450)
PMV BLD AUTO: 10.8 FL (ref 6–12)
RBC # BLD AUTO: 4.32 10*6/MM3 (ref 3.77–5.28)
RH BLD: POSITIVE
RPR SER QL: NORMAL
RUBV IGG SERPL IA-ACNC: POSITIVE
SARS-COV-2 RNA RESP QL NAA+PROBE: NOT DETECTED
T&S EXPIRATION DATE: NORMAL
WBC # BLD AUTO: 10.56 10*3/MM3 (ref 3.4–10.8)

## 2021-05-04 PROCEDURE — 86762 RUBELLA ANTIBODY: CPT | Performed by: OBSTETRICS & GYNECOLOGY

## 2021-05-04 PROCEDURE — 86850 RBC ANTIBODY SCREEN: CPT | Performed by: OBSTETRICS & GYNECOLOGY

## 2021-05-04 PROCEDURE — 25010000002 GENTAMICIN PER 80 MG: Performed by: OBSTETRICS & GYNECOLOGY

## 2021-05-04 PROCEDURE — 25010000002 TERBUTALINE PER 1 MG

## 2021-05-04 PROCEDURE — 59510 CESAREAN DELIVERY: CPT | Performed by: OBSTETRICS & GYNECOLOGY

## 2021-05-04 PROCEDURE — 25010000002 MORPHINE PER 10 MG: Performed by: ANESTHESIOLOGY

## 2021-05-04 PROCEDURE — 86900 BLOOD TYPING SEROLOGIC ABO: CPT | Performed by: OBSTETRICS & GYNECOLOGY

## 2021-05-04 PROCEDURE — U0003 INFECTIOUS AGENT DETECTION BY NUCLEIC ACID (DNA OR RNA); SEVERE ACUTE RESPIRATORY SYNDROME CORONAVIRUS 2 (SARS-COV-2) (CORONAVIRUS DISEASE [COVID-19]), AMPLIFIED PROBE TECHNIQUE, MAKING USE OF HIGH THROUGHPUT TECHNOLOGIES AS DESCRIBED BY CMS-2020-01-R: HCPCS | Performed by: OBSTETRICS & GYNECOLOGY

## 2021-05-04 PROCEDURE — 25010000002 PHENYLEPHRINE PER 1 ML: Performed by: NURSE ANESTHETIST, CERTIFIED REGISTERED

## 2021-05-04 PROCEDURE — 85025 COMPLETE CBC W/AUTO DIFF WBC: CPT | Performed by: OBSTETRICS & GYNECOLOGY

## 2021-05-04 PROCEDURE — S0260 H&P FOR SURGERY: HCPCS | Performed by: OBSTETRICS & GYNECOLOGY

## 2021-05-04 PROCEDURE — 25010000002 ONDANSETRON PER 1 MG: Performed by: ANESTHESIOLOGY

## 2021-05-04 PROCEDURE — 86901 BLOOD TYPING SEROLOGIC RH(D): CPT | Performed by: OBSTETRICS & GYNECOLOGY

## 2021-05-04 RX ORDER — CLINDAMYCIN PHOSPHATE 900 MG/50ML
900 INJECTION INTRAVENOUS ONCE
Status: COMPLETED | OUTPATIENT
Start: 2021-05-04 | End: 2021-05-04

## 2021-05-04 RX ORDER — IBUPROFEN 600 MG/1
600 TABLET ORAL EVERY 6 HOURS SCHEDULED
Status: DISCONTINUED | OUTPATIENT
Start: 2021-05-04 | End: 2021-05-06 | Stop reason: HOSPADM

## 2021-05-04 RX ORDER — ONDANSETRON 4 MG/1
4 TABLET, FILM COATED ORAL EVERY 6 HOURS PRN
Status: DISCONTINUED | OUTPATIENT
Start: 2021-05-04 | End: 2021-05-04 | Stop reason: HOSPADM

## 2021-05-04 RX ORDER — SODIUM CHLORIDE, SODIUM LACTATE, POTASSIUM CHLORIDE, CALCIUM CHLORIDE 600; 310; 30; 20 MG/100ML; MG/100ML; MG/100ML; MG/100ML
125 INJECTION, SOLUTION INTRAVENOUS CONTINUOUS
Status: DISCONTINUED | OUTPATIENT
Start: 2021-05-04 | End: 2021-05-04

## 2021-05-04 RX ORDER — METHYLERGONOVINE MALEATE 0.2 MG/ML
200 INJECTION INTRAVENOUS AS NEEDED
Status: DISCONTINUED | OUTPATIENT
Start: 2021-05-04 | End: 2021-05-04 | Stop reason: HOSPADM

## 2021-05-04 RX ORDER — CARBOPROST TROMETHAMINE 250 UG/ML
250 INJECTION, SOLUTION INTRAMUSCULAR AS NEEDED
Status: DISCONTINUED | OUTPATIENT
Start: 2021-05-04 | End: 2021-05-04 | Stop reason: HOSPADM

## 2021-05-04 RX ORDER — SODIUM CHLORIDE 0.9 % (FLUSH) 0.9 %
3 SYRINGE (ML) INJECTION EVERY 12 HOURS SCHEDULED
Status: DISCONTINUED | OUTPATIENT
Start: 2021-05-04 | End: 2021-05-04 | Stop reason: HOSPADM

## 2021-05-04 RX ORDER — MORPHINE SULFATE 1 MG/ML
INJECTION, SOLUTION EPIDURAL; INTRATHECAL; INTRAVENOUS
Status: COMPLETED | OUTPATIENT
Start: 2021-05-04 | End: 2021-05-04

## 2021-05-04 RX ORDER — TRISODIUM CITRATE DIHYDRATE AND CITRIC ACID MONOHYDRATE 500; 334 MG/5ML; MG/5ML
30 SOLUTION ORAL ONCE
Status: COMPLETED | OUTPATIENT
Start: 2021-05-04 | End: 2021-05-04

## 2021-05-04 RX ORDER — ONDANSETRON 4 MG/1
4 TABLET, FILM COATED ORAL EVERY 8 HOURS PRN
Status: DISCONTINUED | OUTPATIENT
Start: 2021-05-04 | End: 2021-05-06 | Stop reason: HOSPADM

## 2021-05-04 RX ORDER — METOCLOPRAMIDE 10 MG/1
10 TABLET ORAL ONCE
Status: DISCONTINUED | OUTPATIENT
Start: 2021-05-04 | End: 2021-05-06 | Stop reason: HOSPADM

## 2021-05-04 RX ORDER — ONDANSETRON 2 MG/ML
4 INJECTION INTRAMUSCULAR; INTRAVENOUS ONCE AS NEEDED
Status: ACTIVE | OUTPATIENT
Start: 2021-05-04 | End: 2021-05-05

## 2021-05-04 RX ORDER — SIMETHICONE 80 MG
80 TABLET,CHEWABLE ORAL 4 TIMES DAILY PRN
Status: DISCONTINUED | OUTPATIENT
Start: 2021-05-04 | End: 2021-05-06 | Stop reason: HOSPADM

## 2021-05-04 RX ORDER — SODIUM CHLORIDE, SODIUM LACTATE, POTASSIUM CHLORIDE, CALCIUM CHLORIDE 600; 310; 30; 20 MG/100ML; MG/100ML; MG/100ML; MG/100ML
125 INJECTION, SOLUTION INTRAVENOUS CONTINUOUS
Status: DISCONTINUED | OUTPATIENT
Start: 2021-05-04 | End: 2021-05-06 | Stop reason: HOSPADM

## 2021-05-04 RX ORDER — MORPHINE SULFATE 2 MG/ML
2 INJECTION, SOLUTION INTRAMUSCULAR; INTRAVENOUS
Status: DISCONTINUED | OUTPATIENT
Start: 2021-05-04 | End: 2021-05-04 | Stop reason: HOSPADM

## 2021-05-04 RX ORDER — HYDROMORPHONE HYDROCHLORIDE 1 MG/ML
0.5 INJECTION, SOLUTION INTRAMUSCULAR; INTRAVENOUS; SUBCUTANEOUS
Status: DISCONTINUED | OUTPATIENT
Start: 2021-05-04 | End: 2021-05-06 | Stop reason: HOSPADM

## 2021-05-04 RX ORDER — ACETAMINOPHEN 500 MG
1000 TABLET ORAL ONCE
Status: COMPLETED | OUTPATIENT
Start: 2021-05-04 | End: 2021-05-04

## 2021-05-04 RX ORDER — OXYTOCIN-SODIUM CHLORIDE 0.9% IV SOLN 30 UNIT/500ML 30-0.9/5 UT/ML-%
125 SOLUTION INTRAVENOUS CONTINUOUS PRN
Status: COMPLETED | OUTPATIENT
Start: 2021-05-04 | End: 2021-05-04

## 2021-05-04 RX ORDER — TERBUTALINE SULFATE 1 MG/ML
INJECTION, SOLUTION SUBCUTANEOUS
Status: COMPLETED
Start: 2021-05-04 | End: 2021-05-04

## 2021-05-04 RX ORDER — TERBUTALINE SULFATE 1 MG/ML
0.25 INJECTION, SOLUTION SUBCUTANEOUS ONCE
Status: COMPLETED | OUTPATIENT
Start: 2021-05-04 | End: 2021-05-04

## 2021-05-04 RX ORDER — BUPIVACAINE HYDROCHLORIDE 7.5 MG/ML
INJECTION, SOLUTION EPIDURAL; RETROBULBAR
Status: COMPLETED | OUTPATIENT
Start: 2021-05-04 | End: 2021-05-04

## 2021-05-04 RX ORDER — DIPHENHYDRAMINE HCL 25 MG
25 CAPSULE ORAL EVERY 4 HOURS PRN
Status: DISCONTINUED | OUTPATIENT
Start: 2021-05-04 | End: 2021-05-06 | Stop reason: HOSPADM

## 2021-05-04 RX ORDER — ONDANSETRON 2 MG/ML
4 INJECTION INTRAMUSCULAR; INTRAVENOUS EVERY 6 HOURS PRN
Status: DISCONTINUED | OUTPATIENT
Start: 2021-05-04 | End: 2021-05-06 | Stop reason: HOSPADM

## 2021-05-04 RX ORDER — PHYTONADIONE 1 MG/.5ML
INJECTION, EMULSION INTRAMUSCULAR; INTRAVENOUS; SUBCUTANEOUS
Status: DISPENSED
Start: 2021-05-04 | End: 2021-05-04

## 2021-05-04 RX ORDER — NALOXONE HCL 0.4 MG/ML
0.1 VIAL (ML) INJECTION
Status: DISCONTINUED | OUTPATIENT
Start: 2021-05-04 | End: 2021-05-06 | Stop reason: HOSPADM

## 2021-05-04 RX ORDER — OXYTOCIN-SODIUM CHLORIDE 0.9% IV SOLN 30 UNIT/500ML 30-0.9/5 UT/ML-%
999 SOLUTION INTRAVENOUS ONCE
Status: COMPLETED | OUTPATIENT
Start: 2021-05-04 | End: 2021-05-04

## 2021-05-04 RX ORDER — PROMETHAZINE HYDROCHLORIDE 25 MG/1
25 TABLET ORAL EVERY 6 HOURS PRN
Status: DISCONTINUED | OUTPATIENT
Start: 2021-05-04 | End: 2021-05-06 | Stop reason: HOSPADM

## 2021-05-04 RX ORDER — FAMOTIDINE 10 MG/ML
20 INJECTION, SOLUTION INTRAVENOUS ONCE AS NEEDED
Status: COMPLETED | OUTPATIENT
Start: 2021-05-04 | End: 2021-05-04

## 2021-05-04 RX ORDER — EPHEDRINE SULFATE 50 MG/ML
INJECTION, SOLUTION INTRAVENOUS AS NEEDED
Status: DISCONTINUED | OUTPATIENT
Start: 2021-05-04 | End: 2021-05-04 | Stop reason: SURG

## 2021-05-04 RX ORDER — PROMETHAZINE HYDROCHLORIDE 12.5 MG/1
12.5 TABLET ORAL EVERY 4 HOURS PRN
Status: DISCONTINUED | OUTPATIENT
Start: 2021-05-04 | End: 2021-05-06 | Stop reason: HOSPADM

## 2021-05-04 RX ORDER — NALBUPHINE HCL 10 MG/ML
2.5 AMPUL (ML) INJECTION EVERY 4 HOURS PRN
Status: ACTIVE | OUTPATIENT
Start: 2021-05-04 | End: 2021-05-05

## 2021-05-04 RX ORDER — HYDROCORTISONE 25 MG/G
CREAM TOPICAL 3 TIMES DAILY PRN
Status: DISCONTINUED | OUTPATIENT
Start: 2021-05-04 | End: 2021-05-06 | Stop reason: HOSPADM

## 2021-05-04 RX ORDER — HYDROMORPHONE HYDROCHLORIDE 1 MG/ML
0.5 INJECTION, SOLUTION INTRAMUSCULAR; INTRAVENOUS; SUBCUTANEOUS
Status: ACTIVE | OUTPATIENT
Start: 2021-05-04 | End: 2021-05-05

## 2021-05-04 RX ORDER — LIDOCAINE HYDROCHLORIDE 10 MG/ML
5 INJECTION, SOLUTION EPIDURAL; INFILTRATION; INTRACAUDAL; PERINEURAL AS NEEDED
Status: DISCONTINUED | OUTPATIENT
Start: 2021-05-04 | End: 2021-05-04 | Stop reason: HOSPADM

## 2021-05-04 RX ORDER — LANOLIN
CREAM (ML) TOPICAL
Status: DISCONTINUED | OUTPATIENT
Start: 2021-05-04 | End: 2021-05-06 | Stop reason: HOSPADM

## 2021-05-04 RX ORDER — HYDROCODONE BITARTRATE AND ACETAMINOPHEN 7.5; 325 MG/1; MG/1
1 TABLET ORAL EVERY 4 HOURS PRN
Status: DISCONTINUED | OUTPATIENT
Start: 2021-05-04 | End: 2021-05-06 | Stop reason: HOSPADM

## 2021-05-04 RX ORDER — OXYCODONE HYDROCHLORIDE AND ACETAMINOPHEN 5; 325 MG/1; MG/1
1 TABLET ORAL EVERY 4 HOURS PRN
Status: DISCONTINUED | OUTPATIENT
Start: 2021-05-04 | End: 2021-05-06 | Stop reason: HOSPADM

## 2021-05-04 RX ORDER — ONDANSETRON 2 MG/ML
4 INJECTION INTRAMUSCULAR; INTRAVENOUS ONCE AS NEEDED
Status: COMPLETED | OUTPATIENT
Start: 2021-05-04 | End: 2021-05-04

## 2021-05-04 RX ORDER — OXYTOCIN-SODIUM CHLORIDE 0.9% IV SOLN 30 UNIT/500ML 30-0.9/5 UT/ML-%
250 SOLUTION INTRAVENOUS CONTINUOUS PRN
Status: ACTIVE | OUTPATIENT
Start: 2021-05-04 | End: 2021-05-04

## 2021-05-04 RX ORDER — ONDANSETRON 2 MG/ML
4 INJECTION INTRAMUSCULAR; INTRAVENOUS EVERY 6 HOURS PRN
Status: DISCONTINUED | OUTPATIENT
Start: 2021-05-04 | End: 2021-05-04 | Stop reason: HOSPADM

## 2021-05-04 RX ORDER — ERYTHROMYCIN 5 MG/G
OINTMENT OPHTHALMIC
Status: DISPENSED
Start: 2021-05-04 | End: 2021-05-04

## 2021-05-04 RX ORDER — PROMETHAZINE HYDROCHLORIDE 12.5 MG/1
12.5 SUPPOSITORY RECTAL EVERY 6 HOURS PRN
Status: DISCONTINUED | OUTPATIENT
Start: 2021-05-04 | End: 2021-05-06 | Stop reason: HOSPADM

## 2021-05-04 RX ORDER — HYDROXYZINE 50 MG/1
50 TABLET, FILM COATED ORAL EVERY 6 HOURS PRN
Status: DISCONTINUED | OUTPATIENT
Start: 2021-05-04 | End: 2021-05-06 | Stop reason: HOSPADM

## 2021-05-04 RX ORDER — SODIUM CHLORIDE 0.9 % (FLUSH) 0.9 %
1-10 SYRINGE (ML) INJECTION AS NEEDED
Status: DISCONTINUED | OUTPATIENT
Start: 2021-05-04 | End: 2021-05-04 | Stop reason: HOSPADM

## 2021-05-04 RX ADMIN — BUPIVACAINE HYDROCHLORIDE 1.8 ML: 7.5 INJECTION, SOLUTION EPIDURAL; RETROBULBAR at 07:43

## 2021-05-04 RX ADMIN — PHENYLEPHRINE HYDROCHLORIDE 200 MCG: 10 INJECTION INTRAVENOUS at 08:07

## 2021-05-04 RX ADMIN — ONDANSETRON 4 MG: 2 INJECTION INTRAMUSCULAR; INTRAVENOUS at 07:14

## 2021-05-04 RX ADMIN — IBUPROFEN 600 MG: 600 TABLET ORAL at 11:37

## 2021-05-04 RX ADMIN — IBUPROFEN 600 MG: 600 TABLET ORAL at 18:00

## 2021-05-04 RX ADMIN — PHENYLEPHRINE HYDROCHLORIDE 100 MCG: 10 INJECTION INTRAVENOUS at 07:54

## 2021-05-04 RX ADMIN — MORPHINE SULFATE 200 MCG: 1 INJECTION, SOLUTION EPIDURAL; INTRATHECAL; INTRAVENOUS at 07:43

## 2021-05-04 RX ADMIN — EPHEDRINE SULFATE 10 MG: 50 INJECTION INTRAVENOUS at 07:59

## 2021-05-04 RX ADMIN — EPHEDRINE SULFATE 5 MG: 50 INJECTION INTRAVENOUS at 08:22

## 2021-05-04 RX ADMIN — EPHEDRINE SULFATE 10 MG: 50 INJECTION INTRAVENOUS at 07:54

## 2021-05-04 RX ADMIN — FAMOTIDINE 20 MG: 10 INJECTION INTRAVENOUS at 07:14

## 2021-05-04 RX ADMIN — ACETAMINOPHEN 1000 MG: 500 TABLET, FILM COATED ORAL at 07:14

## 2021-05-04 RX ADMIN — TERBUTALINE SULFATE 0.25 MG: 1 INJECTION, SOLUTION SUBCUTANEOUS at 06:24

## 2021-05-04 RX ADMIN — OXYTOCIN 125 ML/HR: 10 INJECTION INTRAVENOUS at 09:38

## 2021-05-04 RX ADMIN — PHENYLEPHRINE HYDROCHLORIDE 200 MCG: 10 INJECTION INTRAVENOUS at 08:22

## 2021-05-04 RX ADMIN — TERBUTALINE SULFATE 0.25 MG: 1 INJECTION SUBCUTANEOUS at 06:24

## 2021-05-04 RX ADMIN — PHENYLEPHRINE HYDROCHLORIDE 200 MCG: 10 INJECTION INTRAVENOUS at 08:31

## 2021-05-04 RX ADMIN — SODIUM CHLORIDE, POTASSIUM CHLORIDE, SODIUM LACTATE AND CALCIUM CHLORIDE 125 ML/HR: 600; 310; 30; 20 INJECTION, SOLUTION INTRAVENOUS at 06:13

## 2021-05-04 RX ADMIN — CLINDAMYCIN PHOSPHATE 900 MG: 900 INJECTION, SOLUTION INTRAVENOUS at 07:27

## 2021-05-04 RX ADMIN — EPHEDRINE SULFATE 10 MG: 50 INJECTION INTRAVENOUS at 07:44

## 2021-05-04 RX ADMIN — SODIUM CITRATE AND CITRIC ACID MONOHYDRATE 30 ML: 500; 334 SOLUTION ORAL at 07:27

## 2021-05-04 RX ADMIN — PHENYLEPHRINE HYDROCHLORIDE 100 MCG: 10 INJECTION INTRAVENOUS at 07:59

## 2021-05-04 RX ADMIN — SODIUM CHLORIDE, POTASSIUM CHLORIDE, SODIUM LACTATE AND CALCIUM CHLORIDE 1000 ML: 600; 310; 30; 20 INJECTION, SOLUTION INTRAVENOUS at 05:15

## 2021-05-04 RX ADMIN — HYDROCODONE BITARTRATE AND ACETAMINOPHEN 1 TABLET: 7.5; 325 TABLET ORAL at 18:00

## 2021-05-04 RX ADMIN — PHENYLEPHRINE HYDROCHLORIDE 200 MCG: 10 INJECTION INTRAVENOUS at 07:49

## 2021-05-04 RX ADMIN — OXYTOCIN 999 ML/HR: 10 INJECTION INTRAVENOUS at 08:05

## 2021-05-04 RX ADMIN — EPHEDRINE SULFATE 5 MG: 50 INJECTION INTRAVENOUS at 08:14

## 2021-05-04 RX ADMIN — GENTAMICIN SULFATE 390 MG: 40 INJECTION, SOLUTION INTRAMUSCULAR; INTRAVENOUS at 06:46

## 2021-05-04 NOTE — ANESTHESIA PREPROCEDURE EVALUATION
Anesthesia Evaluation                  Airway   Mallampati: II  Neck ROM: full  No difficulty expected  Dental      Pulmonary    (+) asthma,  Cardiovascular     Rhythm: regular        Neuro/Psych  GI/Hepatic/Renal/Endo    (+) obesity,       Musculoskeletal     Abdominal    Substance History      OB/GYN          Other                        Anesthesia Plan    ASA 2     spinal       Anesthetic plan, all risks, benefits, and alternatives have been provided, discussed and informed consent has been obtained with: patient.

## 2021-05-04 NOTE — ANESTHESIA POSTPROCEDURE EVALUATION
Patient: Cesia Ambrose    Procedure Summary     Date: 21 Room / Location:  [facility]    Anesthesia Start: 730 Anesthesia Stop: 849    Procedure:  SECTION PRIMARY (N/A Abdomen) Diagnosis:       Severe persistent asthma, unspecified whether complicated      (Severe persistent asthma, unspecified whether complicated [J45.50])    Surgeons: Chavez Garcia MD Provider: Gonzalo Vila MD    Anesthesia Type: spinal ASA Status: 2          Anesthesia Type: spinal    Vitals  Vitals Value Taken Time   /61 21 1450   Temp 36 °C (96.8 °F) 21 1350   Pulse 87 21 1801   Resp 20 21 1745   SpO2 98 % 21 1801           Post Anesthesia Care and Evaluation    No anesthesia care post op

## 2021-05-04 NOTE — ANESTHESIA PROCEDURE NOTES
Spinal Block      Patient reassessed immediately prior to procedure    Patient location during procedure: OB  Start Time: 5/4/2021 7:38 AM  Stop Time: 5/4/2021 7:43 AM  Indication:at surgeon's request  Performed By  Anesthesiologist: Gonzalo Vila MD  CRNA: Michelle Gallagher CRNA  Preanesthetic Checklist  Completed: patient identified, IV checked, site marked, risks and benefits discussed, surgical consent, monitors and equipment checked, pre-op evaluation and timeout performed  Spinal Block Prep:  Patient Position:sitting  Sterile Tech:gloves, cap, mask and sterile barriers  Prep:Chloraprep  Patient Monitoring:blood pressure monitoring, continuous pulse oximetry and EKG  Spinal Block Procedure  Approach:midline  Guidance:landmark technique and palpation technique  Location:L4-L5  Needle Type:Pencan  Needle Gauge:24 G  Placement of Spinal needle event:cerebrospinal fluid aspirated  Paresthesia: no  Fluid Appearance:clear  Medications: bupivacaine PF (MARCAINE) 0.75 % injection, 1.8 mL  Morphine PF injection, 200 mcg  Med Administered at 5/4/2021 7:43 AM   Post Assessment  Patient Tolerance:patient tolerated the procedure well with no apparent complications  Complications no

## 2021-05-05 LAB
BASOPHILS # BLD AUTO: 0.08 10*3/MM3 (ref 0–0.2)
BASOPHILS NFR BLD AUTO: 0.7 % (ref 0–1.5)
DEPRECATED RDW RBC AUTO: 42.4 FL (ref 37–54)
EOSINOPHIL # BLD AUTO: 0.29 10*3/MM3 (ref 0–0.4)
EOSINOPHIL NFR BLD AUTO: 2.6 % (ref 0.3–6.2)
ERYTHROCYTE [DISTWIDTH] IN BLOOD BY AUTOMATED COUNT: 13.8 % (ref 12.3–15.4)
HCT VFR BLD AUTO: 31 % (ref 34–46.6)
HGB BLD-MCNC: 10.2 G/DL (ref 12–15.9)
IMM GRANULOCYTES # BLD AUTO: 0.07 10*3/MM3 (ref 0–0.05)
IMM GRANULOCYTES NFR BLD AUTO: 0.6 % (ref 0–0.5)
LYMPHOCYTES # BLD AUTO: 1.52 10*3/MM3 (ref 0.7–3.1)
LYMPHOCYTES NFR BLD AUTO: 13.9 % (ref 19.6–45.3)
MCH RBC QN AUTO: 27.5 PG (ref 26.6–33)
MCHC RBC AUTO-ENTMCNC: 32.9 G/DL (ref 31.5–35.7)
MCV RBC AUTO: 83.6 FL (ref 79–97)
MONOCYTES # BLD AUTO: 0.92 10*3/MM3 (ref 0.1–0.9)
MONOCYTES NFR BLD AUTO: 8.4 % (ref 5–12)
NEUTROPHILS NFR BLD AUTO: 73.8 % (ref 42.7–76)
NEUTROPHILS NFR BLD AUTO: 8.09 10*3/MM3 (ref 1.7–7)
NRBC BLD AUTO-RTO: 0 /100 WBC (ref 0–0.2)
PLATELET # BLD AUTO: 261 10*3/MM3 (ref 140–450)
PMV BLD AUTO: 10.7 FL (ref 6–12)
RBC # BLD AUTO: 3.71 10*6/MM3 (ref 3.77–5.28)
WBC # BLD AUTO: 10.97 10*3/MM3 (ref 3.4–10.8)

## 2021-05-05 PROCEDURE — 0503F POSTPARTUM CARE VISIT: CPT | Performed by: OBSTETRICS & GYNECOLOGY

## 2021-05-05 PROCEDURE — 85025 COMPLETE CBC W/AUTO DIFF WBC: CPT | Performed by: OBSTETRICS & GYNECOLOGY

## 2021-05-05 RX ORDER — DOCUSATE SODIUM 100 MG/1
100 CAPSULE, LIQUID FILLED ORAL 2 TIMES DAILY PRN
Status: DISCONTINUED | OUTPATIENT
Start: 2021-05-05 | End: 2021-05-06 | Stop reason: HOSPADM

## 2021-05-05 RX ADMIN — IBUPROFEN 600 MG: 600 TABLET ORAL at 12:05

## 2021-05-05 RX ADMIN — DOCUSATE SODIUM 100 MG: 100 CAPSULE, LIQUID FILLED ORAL at 09:21

## 2021-05-05 RX ADMIN — HYDROCODONE BITARTRATE AND ACETAMINOPHEN 1 TABLET: 7.5; 325 TABLET ORAL at 14:15

## 2021-05-05 RX ADMIN — IBUPROFEN 600 MG: 600 TABLET ORAL at 00:04

## 2021-05-05 RX ADMIN — DOCUSATE SODIUM 100 MG: 100 CAPSULE, LIQUID FILLED ORAL at 22:43

## 2021-05-05 RX ADMIN — IBUPROFEN 600 MG: 600 TABLET ORAL at 18:52

## 2021-05-05 RX ADMIN — IBUPROFEN 600 MG: 600 TABLET ORAL at 06:47

## 2021-05-05 RX ADMIN — HYDROCODONE BITARTRATE AND ACETAMINOPHEN 1 TABLET: 7.5; 325 TABLET ORAL at 00:04

## 2021-05-05 RX ADMIN — HYDROCODONE BITARTRATE AND ACETAMINOPHEN 1 TABLET: 7.5; 325 TABLET ORAL at 18:52

## 2021-05-05 RX ADMIN — HYDROCODONE BITARTRATE AND ACETAMINOPHEN 1 TABLET: 7.5; 325 TABLET ORAL at 09:21

## 2021-05-06 VITALS
OXYGEN SATURATION: 98 % | WEIGHT: 243 LBS | SYSTOLIC BLOOD PRESSURE: 129 MMHG | BODY MASS INDEX: 40.48 KG/M2 | TEMPERATURE: 97.8 F | RESPIRATION RATE: 18 BRPM | DIASTOLIC BLOOD PRESSURE: 81 MMHG | HEIGHT: 65 IN | HEART RATE: 99 BPM

## 2021-05-06 PROCEDURE — 0503F POSTPARTUM CARE VISIT: CPT | Performed by: OBSTETRICS & GYNECOLOGY

## 2021-05-06 RX ORDER — IBUPROFEN 600 MG/1
600 TABLET ORAL EVERY 6 HOURS SCHEDULED
Qty: 50 TABLET | Refills: 1 | Status: SHIPPED | OUTPATIENT
Start: 2021-05-06 | End: 2022-06-20

## 2021-05-06 RX ORDER — OXYCODONE HYDROCHLORIDE AND ACETAMINOPHEN 5; 325 MG/1; MG/1
1 TABLET ORAL EVERY 4 HOURS PRN
Qty: 20 TABLET | Refills: 0 | Status: SHIPPED | OUTPATIENT
Start: 2021-05-06 | End: 2021-05-11

## 2021-05-06 RX ORDER — PSEUDOEPHEDRINE HCL 30 MG
100 TABLET ORAL 2 TIMES DAILY PRN
Qty: 28 CAPSULE | Refills: 0 | Status: SHIPPED | OUTPATIENT
Start: 2021-05-06 | End: 2022-06-20

## 2021-05-06 RX ADMIN — DOCUSATE SODIUM 100 MG: 100 CAPSULE, LIQUID FILLED ORAL at 08:33

## 2021-05-06 RX ADMIN — IBUPROFEN 600 MG: 600 TABLET ORAL at 12:17

## 2021-05-06 RX ADMIN — HYDROCODONE BITARTRATE AND ACETAMINOPHEN 1 TABLET: 7.5; 325 TABLET ORAL at 08:32

## 2021-05-06 RX ADMIN — IBUPROFEN 600 MG: 600 TABLET ORAL at 00:02

## 2021-05-06 RX ADMIN — Medication: at 08:33

## 2021-05-06 RX ADMIN — OXYCODONE HYDROCHLORIDE AND ACETAMINOPHEN 1 TABLET: 5; 325 TABLET ORAL at 03:39

## 2021-05-06 RX ADMIN — IBUPROFEN 600 MG: 600 TABLET ORAL at 06:29

## 2021-05-06 RX ADMIN — HYDROCODONE BITARTRATE AND ACETAMINOPHEN 1 TABLET: 7.5; 325 TABLET ORAL at 12:17

## 2021-05-10 ENCOUNTER — TELEPHONE (OUTPATIENT)
Dept: LACTATION | Facility: HOSPITAL | Age: 26
End: 2021-05-10

## 2021-05-10 ENCOUNTER — DOCUMENTATION (OUTPATIENT)
Dept: OBSTETRICS AND GYNECOLOGY | Age: 26
End: 2021-05-10

## 2021-05-10 NOTE — PROGRESS NOTES
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 Nothing new to report. m    GOALS/ TREATMENT SESSION:   1. Patient/Caregiver will be independent with home exercise program--ongoing. 2. Pt will improve dexterity to complete outward translation with stabilization of 2, 1/2\" objects x80% trials. -- completed manipulation task with predominately R hand without stabilization without compensations/drops x3/10 trials. 3. Pt will improve FM endurance to hold utensil with age appropriate grasp t/o entire task (utensil during meal, crayon during coloring picture), 3/5 trials. -- given elbow stability and use of shortened crayon to encourage tripod . Parent edu to continue to practice at home with slantboard and broken crayons. Completed FM strength via etching task req min A.   4. Provide education on how to increase variety of foods in patients diet. --  5. Pt will improve sensory processing/modulation to improve body awareness to navigate treatment room without bumping into/tripping 80% of the time. -able to propel scooterboard in sitting and prone given mod vc's for visual attention to surroundings. 6. Pt will imitate simple geometric shapes given min A, 3/5 trials.  --    EDUCATION  Education provided to patient/family/caregiver:      [x]Yes/New education    [x]Yes/Continued Review of prior Repeat blood pressure 134/82  DTRs normal trace edema, urine negative for protein and sugar  Patient feels well good fetal activity  Recheck some labs as ordered   education   __No  If yes Education Provided: reviewed-see goal 3    Method of Education:     [x]Discussion     []Demonstration    [] Written     []Other  Evaluation of Patients Response to Education:         [x]Patient and or caregiver verbalized understanding  []Patient and or Caregiver Demonstrated without assistance   []Patient and or Caregiver Demonstrated with assistance  []Needs additional instruction to demonstrate understanding of education  ASSESSMENT  Patient tolerated todays treatment session:    [x] Good   []  Fair   []  Poor  Limitations/difficulties with treatment session due to:   []Pain     []Fatigue     []Other medical complications     []Other  Goal Assessment: [] No Change    [x]Improved  Comments:  PLAN  [x]Continue with current plan of care  [][facility]  []IHold per patient request  [] Change Treatment plan:  [] Insurance hold  __ Other     TIME   Time Treatment session was INITIATED 11:15   Time Treatment session was STOPPED 12:00       Total TIMED minutes 45   Total UNTIMED minutes 0   Total TREATMENT minutes 45     Charges: TA3  Electronically signed by:   IVA Leon/NANCY             Date:5/10/2021

## 2021-05-10 NOTE — TELEPHONE ENCOUNTER
D/c lactation call. Cesia reports baby is nursing well and she denies discomfort. Discussed OPLC, gave phone number to call for any concerns.

## 2021-05-11 NOTE — PROGRESS NOTES
5/10/2021      Patient:  Cesia Ambrose   MR#:[id number]    Chart note    Received after hours call with c/o nausea, HA and small of blood from corner of the CS inc while cleaning it and generalized soreness in the incision.    Problem list noted some elevated BPs in labor.    Suggest ER evaluation to exclude PP PreE.    Patient wanted to rest a bit and will go in if symptoms don't improve.      Denies incisional redness.    BP normal at discharge.         Wesly Mcgarry MD   5/10/2021 20:25 EDT

## 2021-05-17 ENCOUNTER — POSTPARTUM VISIT (OUTPATIENT)
Dept: OBSTETRICS AND GYNECOLOGY | Age: 26
End: 2021-05-17

## 2021-05-17 VITALS
WEIGHT: 217 LBS | DIASTOLIC BLOOD PRESSURE: 80 MMHG | BODY MASS INDEX: 36.15 KG/M2 | HEIGHT: 65 IN | SYSTOLIC BLOOD PRESSURE: 128 MMHG

## 2021-05-17 DIAGNOSIS — Z98.891 S/P CESAREAN SECTION: Primary | ICD-10-CM

## 2021-05-17 PROCEDURE — 0503F POSTPARTUM CARE VISIT: CPT | Performed by: PHYSICIAN ASSISTANT

## 2021-05-17 NOTE — PROGRESS NOTES
"    Patient presents for a postpartum visit. She is 2 weeks postpartum following a low cervical transverse  section. I have fully reviewed the prenatal and intrapartum course. The delivery was at 39 gestational weeks. Outcome: primary  section, low transverse incision. Anesthesia: spinal. Postpartum course has been good. Baby's course has been good. Baby is feeding by breast. Bleeding staining only. Bowel function is normal. Bladder function is normal. Patient is not sexually active. Contraception method is abstinence. Postpartum depression screening: negative.    She is doing well  Feels better today then she had been  \"clearer\"  Pain is improving  Noted small amount of blood and d/c this morning  No fever    Denies PPD  Has good support at home  Baby doing well and took right to BF    The following portions of the patient's history were reviewed and updated as appropriate: allergies, current medications, past family history, past medical history, past social history, past surgical history and problem list.    Review of Systems  incision check        Vitals:    21 0942   BP: 128/80       General:  alert, appears stated age and cooperative    Breasts:  na   Lungs: na   Heart:  na   Abdomen: soft, non-tender; bowel sounds normal; no masses,  no organomegaly and incision well approximated, no d/c noted, odor detected and scant granulation tissue noted. silver nitrate applied , no induration or erythema noted    Vulva:  not evaluated   Vagina: not evaluated   Cervix:  na   Corpus: na   Adnexa:  not evaluated   Rectal Exam: Not performed.         2 wk postpartum exam. Pap smear not done at today's visit.    1. Contraception: abstinence  2. HO given on BC options. C/w abstinence in meantime.  3. Follow up in: 4 weeks or as needed.    Granulation tissue noted and sliver nitrate applied. Call for any change or increased d/c, odor.    "

## 2021-05-20 ENCOUNTER — POSTPARTUM VISIT (OUTPATIENT)
Dept: OBSTETRICS AND GYNECOLOGY | Age: 26
End: 2021-05-20

## 2021-05-20 VITALS
HEIGHT: 65 IN | BODY MASS INDEX: 35.32 KG/M2 | DIASTOLIC BLOOD PRESSURE: 80 MMHG | WEIGHT: 212 LBS | SYSTOLIC BLOOD PRESSURE: 128 MMHG

## 2021-05-20 DIAGNOSIS — L03.818 CELLULITIS OF OTHER SPECIFIED SITE: Primary | ICD-10-CM

## 2021-05-20 PROCEDURE — 0503F POSTPARTUM CARE VISIT: CPT | Performed by: PHYSICIAN ASSISTANT

## 2021-05-20 RX ORDER — CEPHALEXIN 500 MG/1
500 CAPSULE ORAL 4 TIMES DAILY
Qty: 40 CAPSULE | Refills: 0 | Status: SHIPPED | OUTPATIENT
Start: 2021-05-20 | End: 2021-05-30

## 2021-05-20 NOTE — PROGRESS NOTES
Patient presents for a postpartum visit. She is 16 days postpartum following a low cervical transverse  section. I have fully reviewed the prenatal and intrapartum course. The delivery was at 39 gestational weeks. Outcome: primary  section, low transverse incision. Anesthesia: spinal. Postpartum course has been good. Baby's course has been good. Baby is feeding by breast. Bleeding thin lochia. Bowel function is normal. Bladder function is normal. Patient is not sexually active. Contraception method is abstinence. Postpartum depression screening: negative.    The following portions of the patient's history were reviewed and updated as appropriate: allergies, current medications, past family history, past medical history, past social history, past surgical history and problem list.    Review of Systems  Integument/breast: positive for incisional irritation and pain        Vitals:    21 1052   BP: 128/80       General:  alert, appears stated age and cooperative    Breasts:  na   Lungs: na   Heart:  na   Abdomen: incision appears well healed and edges are well approximated. slight odor noted but no purulen d/c or bleeding noted. slightly tender above incision, no erythema, mild induration appreciated    Vulva:  not evaluated   Vagina: not evaluated   Cervix:  na   Corpus: not examined   Adnexa:  not evaluated   Rectal Exam: Not performed.         16 days postpartum exam. Pap smear not done at today's visit.    1. Contraception: abstinence  2. Keflex started for pt. She is PCN allergic but has taken keflex in past with no issues. Advised to d/c for rash, etc. Enc to monitor sx's. would advise ER for acutely worsening pain, drainage or fever  3. Follow up in: 7 days or as needed.

## 2021-06-14 ENCOUNTER — POSTPARTUM VISIT (OUTPATIENT)
Dept: OBSTETRICS AND GYNECOLOGY | Age: 26
End: 2021-06-14

## 2021-06-14 VITALS
DIASTOLIC BLOOD PRESSURE: 76 MMHG | SYSTOLIC BLOOD PRESSURE: 128 MMHG | HEIGHT: 65 IN | WEIGHT: 204 LBS | BODY MASS INDEX: 33.99 KG/M2

## 2021-06-14 PROCEDURE — 0503F POSTPARTUM CARE VISIT: CPT | Performed by: PHYSICIAN ASSISTANT

## 2021-06-14 NOTE — PROGRESS NOTES
"    Patient presents for a postpartum visit. She is 6 weeks postpartum following a low cervical transverse  section. I have fully reviewed the prenatal and intrapartum course. The delivery was at 39 gestational weeks. Outcome: primary  section, low transverse incision. Anesthesia: spinal. Postpartum course has been good. Baby's course has been good. Baby is feeding by breast. Bleeding no bleeding. Bowel function is normal. Bladder function is normal. Patient is not sexually active. Contraception method is condoms. Postpartum depression screening: negative.    Has son with her today  He is doing well  Sleeping well and BF like a \"champ\"    The following portions of the patient's history were reviewed and updated as appropriate: allergies, current medications, past family history, past medical history, past social history, past surgical history and problem list.    Review of Systems  Genitourinary:positive for pp exam        Vitals:    21 1002   BP: 128/76       General:  alert, appears stated age and cooperative    Breasts:  deferred   Lungs: na   Heart:  na   Abdomen: normal findings: soft, non-tender and incision well healed    Vulva:  normal   Vagina: vagina positive for n   Cervix:  no lesions   Corpus: normal size, contour, position, consistency, mobility, non-tender   Adnexa:  no mass, fullness, tenderness   Rectal Exam: Not performed.         6 wk postpartum exam. Pap smear not done at today's visit.    1. Contraception: condoms  2. Call if opts for different BC. C/w PNV. Call for any issue  3. Follow up in: 1 year or as needed.  "

## 2021-07-23 ENCOUNTER — TELEPHONE (OUTPATIENT)
Dept: OBSTETRICS AND GYNECOLOGY | Age: 26
End: 2021-07-23

## 2021-07-23 NOTE — TELEPHONE ENCOUNTER
Dr Garcia pt    PT calls stating she began bleeding and has seen small clots on 07/06 and was scheduled with US on 07/15 for evaluation. Pt cancelled that appointment because she stopped bleeding but states this morning she started again also seeing clots. Please advise if ok to schedule pt at 1:45 with US at 2pm today

## 2021-07-23 NOTE — TELEPHONE ENCOUNTER
Since she did stop bleeding and canceled the previous appointment I don't think this needs to be seen today.  Next week would be fine with ultrasound unless she is bleeding heavily (more than a pad and hour).

## 2021-07-27 ENCOUNTER — OFFICE VISIT (OUTPATIENT)
Dept: OBSTETRICS AND GYNECOLOGY | Age: 26
End: 2021-07-27

## 2021-07-27 VITALS
HEIGHT: 65 IN | BODY MASS INDEX: 33.66 KG/M2 | SYSTOLIC BLOOD PRESSURE: 120 MMHG | DIASTOLIC BLOOD PRESSURE: 74 MMHG | WEIGHT: 202 LBS

## 2021-07-27 DIAGNOSIS — N92.6 IRREGULAR MENSES: ICD-10-CM

## 2021-07-27 PROBLEM — R10.9 ABDOMINAL PAIN AFFECTING PREGNANCY, ANTEPARTUM: Status: RESOLVED | Noted: 2021-02-26 | Resolved: 2021-07-27

## 2021-07-27 PROBLEM — O26.899 ABDOMINAL PAIN AFFECTING PREGNANCY, ANTEPARTUM: Status: RESOLVED | Noted: 2021-02-26 | Resolved: 2021-07-27

## 2021-07-27 PROBLEM — O16.9 ELEVATED BLOOD PRESSURE AFFECTING PREGNANCY, ANTEPARTUM: Status: RESOLVED | Noted: 2021-05-01 | Resolved: 2021-07-27

## 2021-07-27 PROBLEM — O99.519 ASTHMA COMPLICATING PREGNANCY, ANTEPARTUM: Status: RESOLVED | Noted: 2021-05-04 | Resolved: 2021-07-27

## 2021-07-27 PROBLEM — J45.909 ASTHMA COMPLICATING PREGNANCY, ANTEPARTUM: Status: RESOLVED | Noted: 2021-05-04 | Resolved: 2021-07-27

## 2021-07-27 PROBLEM — Z34.90 PREGNANCY: Status: RESOLVED | Noted: 2021-04-30 | Resolved: 2021-07-27

## 2021-07-27 PROCEDURE — 99213 OFFICE O/P EST LOW 20 MIN: CPT | Performed by: OBSTETRICS & GYNECOLOGY

## 2021-07-27 NOTE — PROGRESS NOTES
Subjective       History of Present Illness  Cesia Ambrose is a 25 y.o. female is being seen today for history of perceived increased vaginal bleeding after her delivery.  Patient had an elective  back in May of this year.  Felt like she was passing more clots than normal and wanted to be evaluated.  She still breast-feeding.  Her son Alf who weighed 8 pounds 5 ounces at birth is doing well.  Patient has no fever no chills no changes in voiding function or bowel movements.  Physical activity is normal.  Subsequently bleeding seems to have improved since making her appointment  Chief Complaint   Patient presents with   • Gynecologic Exam     Gyn problem: u/s , 20,passing clots   .        The following portions of the patient's history were reviewed and updated as appropriate: allergies, current medications, past family history, past medical history, past social history, past surgical history and problem list.    PAST MEDICAL HISTORY  Past Medical History:   Diagnosis Date   • Anemia    • Anxiety    • Asthma    • Environmental allergies    • GERD (gastroesophageal reflux disease)    • Migraine    • OCD (obsessive compulsive disorder)      OB History    Para Term  AB Living   1 1 1 0 0 1   SAB TAB Ectopic Molar Multiple Live Births   0 0 0 0 0 1      # Outcome Date GA Lbr Pedro Pablo/2nd Weight Sex Delivery Anes PTL Lv   1 Term 21 39w1d  3825 g (8 lb 6.9 oz) M CS-LTranv Spinal N ERICK      Birth Comments: panda 1      Past Surgical History:   Procedure Laterality Date   •  SECTION N/A 2021    Procedure:  SECTION PRIMARY;  Surgeon: Chavez Garcia MD;  Location: [facility] LABOR DELIVERY;  Service: Obstetrics/Gynecology;  Laterality: N/A;   • CYST REMOVAL     • NASAL SEPTUM SURGERY     • WISDOM TOOTH EXTRACTION       Family History   Problem Relation Age of Onset   • Irritable bowel syndrome Maternal Grandmother    • Crohn's disease Brother    • Prostate cancer Father     • Breast cancer Paternal Aunt    • Prostate cancer Paternal Uncle      Social History     Tobacco Use   Smoking Status Former Smoker   • Packs/day: 0.25   • Types: Cigarettes   • Quit date:    • Years since quittin.5   Smokeless Tobacco Never Used       Current Outpatient Medications:   •  albuterol (PROVENTIL) (2.5 MG/3ML) 0.083% nebulizer solution, U 1 UNIT VIA NEB QID PRN, Disp: , Rfl:   •  albuterol sulfate  (90 Base) MCG/ACT inhaler, INHALE 2 PUFFS QID PRF SOB, Disp: , Rfl:   •  cetirizine (zyrTEC) 10 MG tablet, Take 10 mg by mouth Daily., Disp: , Rfl:   •  docusate sodium 100 MG capsule, Take 1 capsule by mouth 2 (Two) Times a Day As Needed for Constipation., Disp: 28 capsule, Rfl: 0  •  Dulera 200-5 MCG/ACT inhaler, , Disp: , Rfl:   •  ibuprofen (ADVIL,MOTRIN) 600 MG tablet, Take 1 tablet by mouth Every 6 (Six) Hours., Disp: 50 tablet, Rfl: 1  •  mometasone-formoterol (DULERA 100) 100-5 MCG/ACT inhaler, Inhale 2 puffs 2 (Two) Times a Day., Disp: , Rfl:   •  montelukast (SINGULAIR) 10 MG tablet, Take 10 mg by mouth every night at bedtime., Disp: , Rfl:   •  Nebulizers (Vios Aerosol Delivery System) misc, U UTD, Disp: , Rfl:   •  prenatal vitamin (prenatal, CLASSIC, vitamin) tablet, Take  by mouth Daily., Disp: , Rfl:   •  Ferrous Sulfate Dried (Feosol) 200 (65 Fe) MG tablet tablet, Take  by mouth 3 (Three) Times a Day With Meals., Disp: , Rfl:   •  ondansetron ODT (ZOFRAN-ODT) 4 MG disintegrating tablet, TAKE 1 TABLET BY MOUTH EVERY 12 HOURS AS NEEDED FOR NAUSEA OR VOMITING, Disp: 30 tablet, Rfl: 2  Immunization History   Administered Date(s) Administered   • COVID-19 (PFIZER) 2021, 2021   • Tdap 02/15/2021       Review of Systems       Except as outlined in history of physical illness, patient denies any changes in her GYN, , GI systems. All other systems reviewed are negative.    Objective   Physical Exam   Alert and oriented, respirations unlabored, heart regular rate and  rhythm   Pelvic external genitalia normal vagina clean dry intact cervix uterus adnexa nonenlarged non tender        Assessment/Plan   Diagnoses and all orders for this visit:    1.  delivery delivered (Primary)    2. Irregular menses    As outlined above patient is doing well, fortunately ultrasound shows thin endometrial stripe otherwise normal gynecological ultrasound with no comparative data.  Patient declines and likely does not need any therapy at this stage we will continue breast-feeding call us if anything changes otherwise we will see her for regular visit.  If bleeding continues to be a problem we can regulate her cycles and patient will let us know she wants to do that    More than 20 minutes was utilized in evaluating, examining, reviewing information and counseling patient           No orders of the defined types were placed in this encounter.          EMR Dragon/ Transcription disclaimer:  Much of the encounter note is an electronic transcription/translation of spoken language to printed text. The electronic translation of spoken language may permit erroneous, or at times, nonessential words or phrases to be inadvertently transcribes; Although i have reviewed the note for such errors, some may still exist.

## 2021-09-09 DIAGNOSIS — N92.6 IRREGULAR MENSES: Primary | ICD-10-CM

## 2021-09-14 ENCOUNTER — TELEPHONE (OUTPATIENT)
Dept: OBSTETRICS AND GYNECOLOGY | Age: 26
End: 2021-09-14

## 2021-09-14 LAB
ERYTHROCYTE [DISTWIDTH] IN BLOOD BY AUTOMATED COUNT: 14.5 % (ref 11.7–15.4)
FSH SERPL-ACNC: 5.5 MIU/ML
HCT VFR BLD AUTO: 40.7 % (ref 34–46.6)
HGB BLD-MCNC: 13 G/DL (ref 11.1–15.9)
LH SERPL-ACNC: 5.3 MIU/ML
MCH RBC QN AUTO: 25.8 PG (ref 26.6–33)
MCHC RBC AUTO-ENTMCNC: 31.9 G/DL (ref 31.5–35.7)
MCV RBC AUTO: 81 FL (ref 79–97)
PLATELET # BLD AUTO: 347 X10E3/UL (ref 150–450)
RBC # BLD AUTO: 5.03 X10E6/UL (ref 3.77–5.28)
TSH SERPL DL<=0.005 MIU/L-ACNC: 1.56 UIU/ML (ref 0.45–4.5)
WBC # BLD AUTO: 8.6 X10E3/UL (ref 3.4–10.8)

## 2021-09-14 NOTE — TELEPHONE ENCOUNTER
----- Message from Chavez Garcia MD sent at 9/14/2021 10:36 AM EDT -----   Please notify pt. That labs are with in normal limits, ok to observe and givre cycles more time to regulate themselves in this post delivery time period

## 2021-09-14 NOTE — PROGRESS NOTES
Please notify pt. That labs are with in normal limits, ok to observe and givre cycles more time to regulate themselves in this post delivery time period

## 2021-10-19 ENCOUNTER — TELEPHONE (OUTPATIENT)
Dept: OBSTETRICS AND GYNECOLOGY | Age: 26
End: 2021-10-19

## 2021-10-19 NOTE — TELEPHONE ENCOUNTER
----- Message from Chavez Garcia MD sent at 10/19/2021  4:29 PM EDT -----  Regarding: FW: Non-Urgent Medical Question  Contact: [phone]      ----- Message -----  From: Shanon Pena MA  Sent: 10/19/2021   3:30 PM EDT  To: Chavez Garcia MD  Subject: FW: Non-Urgent Medical Question                      ----- Message -----  From: Cesia Ambrose  Sent: 10/19/2021   3:22 PM EDT  To: Cassy Hannon [facility]  Subject: Non-Urgent Medical Question                      Good afternoon ,  I have to have surgery to remove my gallbladder and I was told to ask you what the rules are for breast feeding. How long do I need dump the milk before I can start giving it to the baby again?     Thanks!

## 2022-06-20 ENCOUNTER — OFFICE VISIT (OUTPATIENT)
Dept: OBSTETRICS AND GYNECOLOGY | Age: 27
End: 2022-06-20

## 2022-06-20 VITALS
BODY MASS INDEX: 34.49 KG/M2 | HEIGHT: 65 IN | SYSTOLIC BLOOD PRESSURE: 122 MMHG | DIASTOLIC BLOOD PRESSURE: 80 MMHG | WEIGHT: 207 LBS

## 2022-06-20 DIAGNOSIS — N92.6 IRREGULAR MENSES: ICD-10-CM

## 2022-06-20 DIAGNOSIS — Z80.3 FAMILY HISTORY OF BREAST CANCER: ICD-10-CM

## 2022-06-20 DIAGNOSIS — Z31.69 PRE-CONCEPTION COUNSELING: ICD-10-CM

## 2022-06-20 DIAGNOSIS — Z12.4 SCREENING FOR CERVICAL CANCER: ICD-10-CM

## 2022-06-20 DIAGNOSIS — Z01.419 ENCOUNTER FOR GYNECOLOGICAL EXAMINATION: ICD-10-CM

## 2022-06-20 DIAGNOSIS — Z13.71 BRCA NEGATIVE: ICD-10-CM

## 2022-06-20 DIAGNOSIS — R63.8 INCREASED BMI: Primary | ICD-10-CM

## 2022-06-20 PROCEDURE — 99395 PREV VISIT EST AGE 18-39: CPT | Performed by: OBSTETRICS & GYNECOLOGY

## 2022-06-20 NOTE — PROGRESS NOTES
Subjective     Chief Complaint   Patient presents with   • Gynecologic Exam     Annual:last pap ,discuss irregular cycles         History of Present Illness      Cesia Ambrose is a very pleasant  26 y.o. female who presents for annual exam.  Mammo Exam none, Contraception none, Exercise 5 times a week walking    Patient just finished breast-feeding a month ago and her cycles are still little bit irregular.  She has no hirsutism abnormal medicine prescription or supplements.  .      Obstetric History:  OB History        1    Para   1    Term   1       0    AB   0    Living   1       SAB   0    IAB   0    Ectopic   0    Molar   0    Multiple   0    Live Births   1               Menstrual History:     No LMP recorded.       Sexual History:       Past Medical History:   Diagnosis Date   • Anemia    • Anxiety    • Asthma    • Environmental allergies    • GERD (gastroesophageal reflux disease)    • Migraine    • OCD (obsessive compulsive disorder)      Past Surgical History:   Procedure Laterality Date   •  SECTION N/A 2021    Procedure:  SECTION PRIMARY;  Surgeon: Chavez Garcia MD;  Location: [facility] DELIVERY;  Service: Obstetrics/Gynecology;  Laterality: N/A;   • CYST REMOVAL     • NASAL SEPTUM SURGERY     • WISDOM TOOTH EXTRACTION         SOCIAL Hx:      The following portions of the patient's history were reviewed and updated as appropriate: allergies, current medications, past family history, past medical history, past social history, past surgical history and problem list.    Review of Systems        Except as outlined in history of physical illness, patient denies any changes in her GYN, , GI systems.  All other systems reviewed were negative.         Current Outpatient Medications:   •  albuterol (PROVENTIL) (2.5 MG/3ML) 0.083% nebulizer solution, U 1 UNIT VIA NEB QID PRN, Disp: , Rfl:   •  albuterol sulfate  (90 Base) MCG/ACT inhaler, INHALE 2 PUFFS QID PRF SOB,  "Disp: , Rfl:   •  Dulera 200-5 MCG/ACT inhaler, , Disp: , Rfl:   •  Nebulizers (Vios Aerosol Delivery System) misc, U UTD, Disp: , Rfl:    Objective   Physical Exam    /80   Ht 165.1 cm (65\")   Wt 93.9 kg (207 lb)   Breastfeeding No   BMI 34.45 kg/m²     General: Patient is alert and oriented and appears overall healthy  Neck: Is supple without thyromegaly, no carotid bruits and no lymphadenopathy  Lungs: Clear bilaterally, no wheezing, rhonchi, or rales.  Respiratory rate is normal  Breast: Even, symmetrical, no lymphadenopathy, no retraction, no masses or cysts  Heart: Regular rate and rhythm are appreciated, no murmurs or rubs are heard  Abdomen: Is soft, without organomegaly, bowel sounds are positive, there is no rebound or guarding and palpation does not produce any discomfort  Back: Nontender without CVA tenderness  Pelvic: External genitalia appear normal and consistent with mature female.  BUS normal                            Vagina is clean dry without discharge and appears adequately estrogenized, no lesions or masses are present                         Cervix is noninflamed without discharge or lesions.  There is no cervical motion tenderness.                Uterus is nonenlarged, without tenderness, and no masses or abnormalities are  present               Adnexa are non-enlarged, non tender               Rectal exam reveals adequate sphincter tone and no masses or lesions are appreciated on digital rectal examination.      Annual Well Woman Exam  Patient Active Problem List   Diagnosis   • Migraine without aura and with status migrainosus, not intractable   • Pre-conception counseling   • Family history of breast cancer   • BRCA negative   • Gastroesophageal reflux disease without esophagitis   • Increased BMI   • Asthma   • Severe persistent asthma, unspecified whether complicated   •  delivery delivered                 Assessment & Plan   Diagnoses and all orders for this " visit:    1. Increased BMI (Primary)    2. BRCA negative    3. Family history of breast cancer    4. Pre-conception counseling    5. Screening for cervical cancer    6. Encounter for gynecological examination    7. Irregular menses  -     TSH  -     T4, Free  -     Follicle Stimulating Hormone    Suspect her irregular cycles are due to the fact she is just now finishing breast-feeding.  They are open to pregnancy at any time do not use any contraception I have encouraged her to take prenatal vitamin with folic acid.    Discussed today's findings and concerns with patient.  Continue to recommend regular exercise including cardiovascular and resistance training as well as  breast self-exam. Wellness lab, mammography, & pap smear, in accordance with age guidelines.    I have encouraged her to call for today's test results if she has not received them within 10 days.  Patient is advised to call with any change in her condition or with any other questions, otherwise return  for annual examination.

## 2022-06-21 ENCOUNTER — TELEPHONE (OUTPATIENT)
Dept: OBSTETRICS AND GYNECOLOGY | Age: 27
End: 2022-06-21

## 2022-06-21 LAB
FSH SERPL-ACNC: 2.3 MIU/ML
T4 FREE SERPL-MCNC: 1.42 NG/DL (ref 0.82–1.77)
TSH SERPL DL<=0.005 MIU/L-ACNC: 1.51 UIU/ML (ref 0.45–4.5)

## 2022-06-22 LAB
CYTOLOGIST CVX/VAG CYTO: NORMAL
CYTOLOGY CVX/VAG DOC CYTO: NORMAL
CYTOLOGY CVX/VAG DOC THIN PREP: NORMAL
DX ICD CODE: NORMAL
HIV 1 & 2 AB SER-IMP: NORMAL
HPV I/H RISK 4 DNA CVX QL PROBE+SIG AMP: NEGATIVE
OTHER STN SPEC: NORMAL
STAT OF ADQ CVX/VAG CYTO-IMP: NORMAL

## 2022-08-31 ENCOUNTER — OFFICE VISIT (OUTPATIENT)
Dept: OBSTETRICS AND GYNECOLOGY | Age: 27
End: 2022-08-31

## 2022-08-31 VITALS
HEIGHT: 65 IN | WEIGHT: 210 LBS | BODY MASS INDEX: 34.99 KG/M2 | SYSTOLIC BLOOD PRESSURE: 122 MMHG | DIASTOLIC BLOOD PRESSURE: 74 MMHG

## 2022-08-31 DIAGNOSIS — Z13.89 SCREENING FOR BLOOD OR PROTEIN IN URINE: ICD-10-CM

## 2022-08-31 DIAGNOSIS — N89.8 VAGINAL DISCHARGE: Primary | ICD-10-CM

## 2022-08-31 DIAGNOSIS — R30.0 DYSURIA: ICD-10-CM

## 2022-08-31 LAB
BILIRUB BLD-MCNC: NEGATIVE MG/DL
CLARITY, POC: CLEAR
COLOR UR: YELLOW
GLUCOSE UR STRIP-MCNC: NEGATIVE MG/DL
KETONES UR QL: NEGATIVE
LEUKOCYTE EST, POC: ABNORMAL
NITRITE UR-MCNC: NEGATIVE MG/ML
PH UR: 6.5 [PH] (ref 5–8)
PROT UR STRIP-MCNC: NEGATIVE MG/DL
RBC # UR STRIP: ABNORMAL /UL
SP GR UR: 1.01 (ref 1–1.03)
UROBILINOGEN UR QL: ABNORMAL

## 2022-08-31 PROCEDURE — 81002 URINALYSIS NONAUTO W/O SCOPE: CPT | Performed by: NURSE PRACTITIONER

## 2022-08-31 PROCEDURE — 99213 OFFICE O/P EST LOW 20 MIN: CPT | Performed by: NURSE PRACTITIONER

## 2022-08-31 RX ORDER — FLUCONAZOLE 150 MG/1
150 TABLET ORAL ONCE
Qty: 1 TABLET | Refills: 0 | Status: SHIPPED | OUTPATIENT
Start: 2022-08-31 | End: 2022-08-31

## 2022-08-31 RX ORDER — METRONIDAZOLE 500 MG/1
500 TABLET ORAL 2 TIMES DAILY
Qty: 14 TABLET | Refills: 0 | Status: SHIPPED | OUTPATIENT
Start: 2022-08-31 | End: 2022-09-07

## 2022-08-31 NOTE — PROGRESS NOTES
"Subjective     Chief Complaint   Patient presents with   • Gynecologic Exam     Discharge, itching, pain when urinating       Cesia Ambrose is a 26 y.o.  whose LMP is Patient's last menstrual period was 2022.     Pt presents today with chief complaint of vaginal discharge, itching and dysuria  She states the symptoms come and go  This episode started Saturday  She is   She would still like to be screened for everything today  She does not some dysuria and frequency but states this has been going on since having the baby      No Additional Complaints Reported    The following portions of the patient's history were reviewed and updated as appropriate:vital signs, allergies, current medications, past medical history, past social history, past surgical history and problem list      Review of Systems   Pertinent items are noted in HPI.     Objective      /74   Ht 165.1 cm (65\")   Wt 95.3 kg (210 lb)   LMP 2022   BMI 34.95 kg/m²     Physical Exam    General:   alert, no distress and mildly obese   Heart: Not performed today   Lungs: Not performed today.   Breast: Not performed today   Neck: na   Abdomen: {Not performed today   CVA: Not performed today   Pelvis: External genitalia: normal general appearance  Urinary system: urethral meatus normal  Vaginal: inflamed mucosa, small amount of white discharge  Cervix: normal appearance   Extremities: Not performed today   Neurologic: negative   Psychiatric: Normal affect, judgement, and mood       Lab Review   Labs: U/a     Imaging   No data reviewed    Assessment & Plan     ASSESSMENT  1. Vaginal discharge    2. Screening for blood or protein in urine    3. Dysuria        PLAN  1.   Orders Placed This Encounter   Procedures   • NuSwab VG+ - Swab, Vagina   • POC Urinalysis Dipstick       2. Medications prescribed this encounter:        New Medications Ordered This Visit   Medications   • metroNIDAZOLE (Flagyl) 500 MG tablet     Sig: Take 1 " tablet by mouth 2 (Two) Times a Day for 7 days.     Dispense:  14 tablet     Refill:  0   • fluconazole (Diflucan) 150 MG tablet     Sig: Take 1 tablet by mouth 1 (One) Time for 1 dose.     Dispense:  1 tablet     Refill:  0       3. Treat for BV and yeast. Urine culture sent. Vaginal swab for std's, BV and yeast. Will call with results.     Follow up: LUSI Gleason, [place]  8/31/2022

## 2022-09-02 LAB
A VAGINAE DNA VAG QL NAA+PROBE: ABNORMAL SCORE
BACTERIA UR CULT: NORMAL
BACTERIA UR CULT: NORMAL
BVAB2 DNA VAG QL NAA+PROBE: ABNORMAL SCORE
C ALBICANS DNA VAG QL NAA+PROBE: POSITIVE
C GLABRATA DNA VAG QL NAA+PROBE: NEGATIVE
C TRACH DNA VAG QL NAA+PROBE: NEGATIVE
MEGA1 DNA VAG QL NAA+PROBE: ABNORMAL SCORE
N GONORRHOEA DNA VAG QL NAA+PROBE: NEGATIVE
T VAGINALIS DNA VAG QL NAA+PROBE: NEGATIVE

## 2022-12-16 ENCOUNTER — OFFICE VISIT (OUTPATIENT)
Dept: OBSTETRICS AND GYNECOLOGY | Age: 27
End: 2022-12-16

## 2022-12-16 VITALS
BODY MASS INDEX: 34.66 KG/M2 | WEIGHT: 208 LBS | HEIGHT: 65 IN | SYSTOLIC BLOOD PRESSURE: 122 MMHG | DIASTOLIC BLOOD PRESSURE: 78 MMHG

## 2022-12-16 DIAGNOSIS — J45.40 MODERATE PERSISTENT ASTHMA, UNSPECIFIED WHETHER COMPLICATED: ICD-10-CM

## 2022-12-16 DIAGNOSIS — O36.80X0 ENCOUNTER TO DETERMINE FETAL VIABILITY OF PREGNANCY, SINGLE OR UNSPECIFIED FETUS: Primary | ICD-10-CM

## 2022-12-16 DIAGNOSIS — O21.9 NAUSEA/VOMITING IN PREGNANCY: ICD-10-CM

## 2022-12-16 PROBLEM — K80.10 CALCULUS OF GALLBLADDER WITH CHRONIC CHOLECYSTITIS WITHOUT OBSTRUCTION: Status: ACTIVE | Noted: 2021-10-18

## 2022-12-16 PROBLEM — K80.20 GALLSTONES: Status: ACTIVE | Noted: 2021-10-19

## 2022-12-16 PROCEDURE — 99213 OFFICE O/P EST LOW 20 MIN: CPT | Performed by: NURSE PRACTITIONER

## 2022-12-16 RX ORDER — ONDANSETRON 8 MG/1
8 TABLET, ORALLY DISINTEGRATING ORAL EVERY 8 HOURS PRN
Qty: 90 TABLET | Refills: 2 | Status: SHIPPED | OUTPATIENT
Start: 2022-12-16 | End: 2023-01-15

## 2022-12-16 RX ORDER — DIPHENHYDRAMINE HYDROCHLORIDE 25 MG/1
25 CAPSULE ORAL 3 TIMES DAILY
Qty: 270 TABLET | Refills: 0 | Status: SHIPPED | OUTPATIENT
Start: 2022-12-16 | End: 2023-03-16

## 2022-12-16 RX ORDER — FAMOTIDINE 20 MG/1
20 TABLET, FILM COATED ORAL DAILY
Qty: 30 TABLET | Refills: 1 | Status: SHIPPED | OUTPATIENT
Start: 2022-12-16 | End: 2023-02-14 | Stop reason: SDUPTHER

## 2022-12-16 NOTE — PROGRESS NOTES
[facility]   Obstetrics and Gynecology       2022    Patient: Cesia Ambrose          MR#:[id number]    History of Present Illness    Chief Complaint   Patient presents with   • Possible Pregnancy     pregnancy confirmation, LMP 10/18/22, Link pt       27 y.o. female  who presents for pregnancy confirmation.  She has been feeling very nauseated with some vomiting and breast tenderness.  She and her  are very excited.    Studies reviewed:  U/S shows viable first trimester IUP with FHR: 167    Patient's last menstrual period was 2022.  Obstetric History:  OB History        2    Para   1    Term   1       0    AB   0    Living   1       SAB   0    IAB   0    Ectopic   0    Molar   0    Multiple   0    Live Births   1               Menstrual History:     Patient's last menstrual period was 2022.       Sexual History:       Brief Urine Lab Results  (Last result in the past 365 days)      Color   Clarity   Blood   Leuk Est   Nitrite   Protein   CREAT   Urine HCG        22 0844 Yellow   Clear   Trace   Small (1+)   Negative   Negative                ________________________________________  Patient Active Problem List   Diagnosis   • Migraine without aura and with status migrainosus, not intractable   • Pre-conception counseling   • Family history of breast cancer   • BRCA negative   • Gastroesophageal reflux disease without esophagitis   • Increased BMI   • Asthma   • Severe persistent asthma, unspecified whether complicated   •  delivery delivered   • Gallstones   • Calculus of gallbladder with chronic cholecystitis without obstruction   • Nausea/vomiting in pregnancy     Past Medical History:   Diagnosis Date   • Anemia    • Anxiety    • Asthma    • Environmental allergies    • GERD (gastroesophageal reflux disease)    • Migraine    • OCD (obsessive compulsive disorder)      Past Surgical History:   Procedure Laterality Date   •  SECTION N/A  2021    Procedure:  SECTION PRIMARY;  Surgeon: Chavez Garcia MD;  Location: [facility] LABOR DELIVERY;  Service: Obstetrics/Gynecology;  Laterality: N/A;   • CHOLECYSTECTOMY     • CYST REMOVAL     • LAPAROSCOPIC CHOLECYSTECTOMY  1030   • NASAL SEPTUM SURGERY     • WISDOM TOOTH EXTRACTION       Social History     Tobacco Use   Smoking Status Former   • Packs/day: 0.25   • Years: 10.00   • Pack years: 2.50   • Types: Cigarettes, Electronic Cigarette   • Quit date: 2016   • Years since quittin.9   Smokeless Tobacco Never     Family History   Problem Relation Age of Onset   • Irritable bowel syndrome Maternal Grandmother    • Crohn's disease Brother    • Prostate cancer Father    • Breast cancer Paternal Aunt    • Prostate cancer Paternal Uncle    • Prostate cancer Paternal Grandfather      Prior to Admission medications    Medication Sig Start Date End Date Taking? Authorizing Provider   albuterol (PROVENTIL) (2.5 MG/3ML) 0.083% nebulizer solution U 1 UNIT VIA NEB QID PRN 20   ProviderStefanie MD   albuterol sulfate  (90 Base) MCG/ACT inhaler INHALE 2 PUFFS QID PRF SOB 20   ProviderStefaine MD   azithromycin (ZITHROMAX) 250 MG tablet Take 2 tablets the first day, then 1 tablet daily for 4 days. 22   Merlyn Billy APRN     ________________________________________    The following portions of the patient's history were reviewed and updated as appropriate: allergies, current medications, past family history, past medical history, past social history, past surgical history, and problem list.    Review of Systems   Constitutional: Negative for activity change, appetite change, chills, fatigue and fever.   Respiratory: Negative for cough and shortness of breath.    Cardiovascular: Negative for chest pain.   Gastrointestinal: Positive for nausea and vomiting. Negative for constipation and diarrhea.   Genitourinary: Negative for dysuria, flank pain, genital sores,  "hematuria, menstrual problem and vaginal bleeding.           Objective   Physical Exam  Vitals reviewed.   Constitutional:       Appearance: Normal appearance. She is normal weight.   HENT:      Head: Normocephalic and atraumatic.      Nose: Nose normal.      Mouth/Throat:      Mouth: Mucous membranes are moist.   Eyes:      Pupils: Pupils are equal, round, and reactive to light.   Pulmonary:      Effort: Pulmonary effort is normal.   Abdominal:      General: Abdomen is flat.      Palpations: Abdomen is soft.   Musculoskeletal:         General: Normal range of motion.      Cervical back: Normal range of motion and neck supple.   Skin:     General: Skin is warm and dry.   Neurological:      Mental Status: She is alert and oriented to person, place, and time.         /78   Ht 165.1 cm (65\")   Wt 94.3 kg (208 lb)   LMP 08/08/2022   BMI 34.61 kg/m²    BP Readings from Last 3 Encounters:   12/16/22 122/78   11/26/22 118/83   08/31/22 122/74      Wt Readings from Last 3 Encounters:   12/16/22 94.3 kg (208 lb)   11/26/22 93 kg (205 lb)   08/31/22 95.3 kg (210 lb)        BMI: Estimated body mass index is 34.61 kg/m² as calculated from the following:    Height as of this encounter: 165.1 cm (65\").    Weight as of this encounter: 94.3 kg (208 lb).    Reviewed routine prenatal care with the office to include but not limited to expected weight gain during pregnancy, Tylenol products are fine, avoid aspirin and ibuprofen; not to change cat litter; food restrictions; avoidance of alcohol, tobacco, drugs and saunas/hot tubs, discussed that the COVID vaccine is safe and recommended in pregnancy.            Assessment:  Diagnoses and all orders for this visit:    1. Encounter to determine fetal viability of pregnancy, single or unspecified fetus (Primary)  -     Urine Culture - Urine, Urine, Clean Catch  -     OB Panel With HIV  -     Chlamydia trachomatis, Neisseria gonorrhoeae, Trichomonas vaginalis, PCR - Urine, Urine, " Clean Catch    2. Nausea/vomiting in pregnancy  -     famotidine (Pepcid) 20 MG tablet; Take 1 tablet by mouth Daily.  Dispense: 30 tablet; Refill: 1  -     vitamin B-6 (PYRIDOXINE) 25 MG tablet; Take 1 tablet by mouth 3 (Three) Times a Day for 90 days.  Dispense: 270 tablet; Refill: 0  -     doxylamine (UNISOM) 25 MG tablet; Take 1 tablet by mouth every night at bedtime for 90 days.  Dispense: 90 tablet; Refill: 0  -     ondansetron ODT (ZOFRAN-ODT) 8 MG disintegrating tablet; Place 1 tablet on the tongue Every 8 (Eight) Hours As Needed for Nausea or Vomiting for up to 30 days.  Dispense: 90 tablet; Refill: 2    3. Moderate persistent asthma, unspecified whether complicated        Plan:  Return in about 4 weeks (around 1/13/2023) for ob intake with Dr. Garcia.    Gris Ramirez, APRN  12/16/2022 16:31 EST

## 2022-12-17 LAB
ABO GROUP BLD: ABNORMAL
BASOPHILS # BLD AUTO: 0.1 X10E3/UL (ref 0–0.2)
BASOPHILS NFR BLD AUTO: 1 %
BLD GP AB SCN SERPL QL: NEGATIVE
EOSINOPHIL # BLD AUTO: 0.5 X10E3/UL (ref 0–0.4)
EOSINOPHIL NFR BLD AUTO: 4 %
ERYTHROCYTE [DISTWIDTH] IN BLOOD BY AUTOMATED COUNT: 13.3 % (ref 11.7–15.4)
HBV SURFACE AG SERPL QL IA: NEGATIVE
HCT VFR BLD AUTO: 41.3 % (ref 34–46.6)
HCV AB S/CO SERPL IA: <0.1 S/CO RATIO (ref 0–0.9)
HGB BLD-MCNC: 13.8 G/DL (ref 11.1–15.9)
HIV 1+2 AB+HIV1 P24 AG SERPL QL IA: NON REACTIVE
IMM GRANULOCYTES # BLD AUTO: 0 X10E3/UL (ref 0–0.1)
IMM GRANULOCYTES NFR BLD AUTO: 0 %
LYMPHOCYTES # BLD AUTO: 2.5 X10E3/UL (ref 0.7–3.1)
LYMPHOCYTES NFR BLD AUTO: 22 %
MCH RBC QN AUTO: 27.8 PG (ref 26.6–33)
MCHC RBC AUTO-ENTMCNC: 33.4 G/DL (ref 31.5–35.7)
MCV RBC AUTO: 83 FL (ref 79–97)
MONOCYTES # BLD AUTO: 0.7 X10E3/UL (ref 0.1–0.9)
MONOCYTES NFR BLD AUTO: 6 %
NEUTROPHILS # BLD AUTO: 7.5 X10E3/UL (ref 1.4–7)
NEUTROPHILS NFR BLD AUTO: 67 %
PLATELET # BLD AUTO: 406 X10E3/UL (ref 150–450)
RBC # BLD AUTO: 4.97 X10E6/UL (ref 3.77–5.28)
RH BLD: POSITIVE
RPR SER QL: NON REACTIVE
RUBV IGG SERPL IA-ACNC: 3.48 INDEX
WBC # BLD AUTO: 11.4 X10E3/UL (ref 3.4–10.8)

## 2022-12-18 LAB
BACTERIA UR CULT: NORMAL
BACTERIA UR CULT: NORMAL

## 2022-12-19 LAB
C TRACH RRNA SPEC QL NAA+PROBE: NEGATIVE
N GONORRHOEA RRNA SPEC QL NAA+PROBE: NEGATIVE
T VAGINALIS RRNA SPEC QL NAA+PROBE: NEGATIVE

## 2023-01-11 ENCOUNTER — OFFICE VISIT (OUTPATIENT)
Dept: OBSTETRICS AND GYNECOLOGY | Age: 28
End: 2023-01-11
Payer: COMMERCIAL

## 2023-01-11 ENCOUNTER — TELEPHONE (OUTPATIENT)
Dept: OBSTETRICS AND GYNECOLOGY | Age: 28
End: 2023-01-11
Payer: COMMERCIAL

## 2023-01-11 VITALS
HEIGHT: 65 IN | DIASTOLIC BLOOD PRESSURE: 60 MMHG | BODY MASS INDEX: 35.49 KG/M2 | WEIGHT: 213 LBS | SYSTOLIC BLOOD PRESSURE: 126 MMHG

## 2023-01-11 DIAGNOSIS — R10.9 ABDOMINAL PAIN AFFECTING PREGNANCY: Primary | ICD-10-CM

## 2023-01-11 DIAGNOSIS — O20.0 THREATENED MISCARRIAGE IN EARLY PREGNANCY: ICD-10-CM

## 2023-01-11 DIAGNOSIS — Z3A.12 12 WEEKS GESTATION OF PREGNANCY: ICD-10-CM

## 2023-01-11 DIAGNOSIS — O26.899 ABDOMINAL PAIN AFFECTING PREGNANCY: Primary | ICD-10-CM

## 2023-01-11 PROCEDURE — 99213 OFFICE O/P EST LOW 20 MIN: CPT | Performed by: OBSTETRICS & GYNECOLOGY

## 2023-01-11 RX ORDER — LEVALBUTEROL TARTRATE 45 UG/1
AEROSOL, METERED ORAL
COMMUNITY
Start: 2023-01-05

## 2023-01-11 NOTE — PROGRESS NOTES
Subjective       History of Present Illness  Cesia Ambrose is a 27 y.o. female is being seen today for patient called for some vaginal spotting and left lower quadrant discomfort earlier this morning it has significantly improved.  She is currently 12 weeks pregnant  Chief Complaint   Patient presents with   • Vaginal Bleeding     Bleeding in early pregnancy.    .        The following portions of the patient's history were reviewed and updated as appropriate: allergies, current medications, past family history, past medical history, past social history, past surgical history and problem list.    PAST MEDICAL HISTORY  Past Medical History:   Diagnosis Date   • Anemia    • Anxiety    • Asthma    • Environmental allergies    • GERD (gastroesophageal reflux disease)    • Migraine    • OCD (obsessive compulsive disorder)      OB History    Para Term  AB Living   2 1 1 0 0 1   SAB IAB Ectopic Molar Multiple Live Births   0 0 0 0 0 1      # Outcome Date GA Lbr Pedro Pablo/2nd Weight Sex Delivery Anes PTL Lv   2 Current            1 Term 21 39w1d  3825 g (8 lb 6.9 oz) M CS-LTranv Spinal N ERICK      Birth Comments: panda 1      Past Surgical History:   Procedure Laterality Date   •  SECTION N/A 2021    Procedure:  SECTION PRIMARY;  Surgeon: Chavez Garcia MD;  Location: [facility] LABOR DELIVERY;  Service: Obstetrics/Gynecology;  Laterality: N/A;   • CHOLECYSTECTOMY     • CYST REMOVAL     • LAPAROSCOPIC CHOLECYSTECTOMY  1030   • NASAL SEPTUM SURGERY     • WISDOM TOOTH EXTRACTION       Family History   Problem Relation Age of Onset   • Irritable bowel syndrome Maternal Grandmother    • Crohn's disease Brother    • Prostate cancer Father    • Breast cancer Paternal Aunt    • Prostate cancer Paternal Uncle    • Prostate cancer Paternal Grandfather      Social History     Tobacco Use   Smoking Status Former   • Packs/day: 0.25   • Years: 10.00   • Pack years: 2.50   • Types: Cigarettes, Electronic  Cigarette   • Quit date: 2016   • Years since quittin.0   Smokeless Tobacco Never       Current Outpatient Medications:   •  albuterol (PROVENTIL) (2.5 MG/3ML) 0.083% nebulizer solution, U 1 UNIT VIA NEB QID PRN, Disp: , Rfl:   •  albuterol sulfate  (90 Base) MCG/ACT inhaler, INHALE 2 PUFFS QID PRF SOB, Disp: , Rfl:   •  doxylamine (UNISOM) 25 MG tablet, Take 1 tablet by mouth every night at bedtime for 90 days., Disp: 90 tablet, Rfl: 0  •  famotidine (Pepcid) 20 MG tablet, Take 1 tablet by mouth Daily., Disp: 30 tablet, Rfl: 1  •  levalbuterol (XOPENEX HFA) 45 MCG/ACT inhaler, , Disp: , Rfl:   •  ondansetron ODT (ZOFRAN-ODT) 8 MG disintegrating tablet, Place 1 tablet on the tongue Every 8 (Eight) Hours As Needed for Nausea or Vomiting for up to 30 days., Disp: 90 tablet, Rfl: 2  •  vitamin B-6 (PYRIDOXINE) 25 MG tablet, Take 1 tablet by mouth 3 (Three) Times a Day for 90 days., Disp: 270 tablet, Rfl: 0  Immunization History   Administered Date(s) Administered   • COVID-19 (PFIZER) PURPLE CAP 2021, 2021   • Tdap 02/15/2021       Review of Systems       Except as outlined in history of physical illness, patient denies any changes in her GYN, , GI systems. All other systems reviewed are negative.    Objective   Physical Exam   Alert and oriented, respirations unlabored, heart regular rate and rhythm  Abdomen is soft nontender positive bowel sounds no rebound no guarding   Pelvic external genitalia normal female vagina clean dry intact cervix long thick and closed uterus is enlarged approximately 13 weeks size adnexa are nonenlarged non tender  Hand-held ultrasound shows approximately 12 to 13 weeks size fetus with positive cardiac activity.  We could see some fetal movement.      Assessment & Plan   Diagnoses and all orders for this visit:    1. Abdominal pain affecting pregnancy (Primary)    2. 12 weeks gestation of pregnancy    3. Threatened miscarriage in early pregnancy    Physical  exam is reassuring, hand-held ultrasound shows good cardiac activity.  Cervix is long thick and closed physical exam benign.  Will stay at pelvic rest for 7 days, as symptoms seem to have lessened or improved will observe but if anything changes she will call us back.  She has a follow-up appointment scheduled with us next week             No orders of the defined types were placed in this encounter.          EMR Dragon/ Transcription disclaimer:  Much of the encounter note is an electronic transcription/translation of spoken language to printed text. The electronic translation of spoken language may permit erroneous, or at times, nonessential words or phrases to be inadvertently transcribes; Although i have reviewed the note for such errors, some may still exist.

## 2023-01-11 NOTE — TELEPHONE ENCOUNTER
Pt calling with c/o red bleeding when wipes and LLQ pain, no us available today.RUTHY 7/25/23 the patient is 12 2/7

## 2023-01-13 ENCOUNTER — PATIENT MESSAGE (OUTPATIENT)
Dept: OBSTETRICS AND GYNECOLOGY | Age: 28
End: 2023-01-13
Payer: COMMERCIAL

## 2023-01-17 ENCOUNTER — INITIAL PRENATAL (OUTPATIENT)
Dept: OBSTETRICS AND GYNECOLOGY | Age: 28
End: 2023-01-17
Payer: COMMERCIAL

## 2023-01-17 VITALS — WEIGHT: 209.8 LBS | DIASTOLIC BLOOD PRESSURE: 64 MMHG | BODY MASS INDEX: 34.91 KG/M2 | SYSTOLIC BLOOD PRESSURE: 120 MMHG

## 2023-01-17 DIAGNOSIS — Z3A.13 13 WEEKS GESTATION OF PREGNANCY: Primary | ICD-10-CM

## 2023-01-17 DIAGNOSIS — J45.40 MODERATE PERSISTENT ASTHMA, UNSPECIFIED WHETHER COMPLICATED: ICD-10-CM

## 2023-01-17 DIAGNOSIS — Z13.89 SCREENING FOR BLOOD OR PROTEIN IN URINE: ICD-10-CM

## 2023-01-17 DIAGNOSIS — R63.8 INCREASED BMI: ICD-10-CM

## 2023-01-17 DIAGNOSIS — O21.9 NAUSEA/VOMITING IN PREGNANCY: ICD-10-CM

## 2023-01-17 PROBLEM — J45.50 SEVERE PERSISTENT ASTHMA, UNSPECIFIED WHETHER COMPLICATED: Status: RESOLVED | Noted: 2021-03-23 | Resolved: 2023-01-17

## 2023-01-17 PROBLEM — Z31.69 PRE-CONCEPTION COUNSELING: Status: RESOLVED | Noted: 2020-02-26 | Resolved: 2023-01-17

## 2023-01-17 PROCEDURE — 0502F SUBSEQUENT PRENATAL CARE: CPT | Performed by: OBSTETRICS & GYNECOLOGY

## 2023-01-17 RX ORDER — PRENATAL VIT NO.126/IRON/FOLIC 28MG-0.8MG
1 TABLET ORAL DAILY
COMMUNITY

## 2023-01-17 NOTE — PROGRESS NOTES
Chief Complaint   Patient presents with   • Follow-up     13w0d, No problems today     HPI- Pt is 27 y.o.  at 13w0d here for prenatal visit.     ROS-     - No vaginal bleeding    GI- No abdominal pain    /64   Wt 95.2 kg (209 lb 12.8 oz)   LMP 2022   BMI 34.91 kg/m²   Exam - See flow sheet    Fetal heart rate is normal    Assessment-  Diagnoses and all orders for this visit:    13 weeks gestation of pregnancy  -     POC Urinalysis Dipstick    Screening for blood or protein in urine  -     POC Urinalysis Dipstick    Nausea/vomiting in pregnancy    Increased BMI    Moderate persistent asthma, unspecified whether complicated    Other orders  -     prenatal vitamin (prenatal, CLASSIC, vitamin) tablet; Take 1 tablet by mouth Daily.  -     Prenatal MV & Min w/FA-DHA (PRENATAL ADULT GUMMY/DHA/FA PO); Take 1 tablet by mouth Daily.    Patient feeling better keeping things down Unisom B6 not needed is much, positive fetal heart tones today, much improved since last week, declined cell free DNA, ultrasound in 8 weeks

## 2023-02-14 ENCOUNTER — ROUTINE PRENATAL (OUTPATIENT)
Dept: OBSTETRICS AND GYNECOLOGY | Age: 28
End: 2023-02-14
Payer: COMMERCIAL

## 2023-02-14 VITALS — SYSTOLIC BLOOD PRESSURE: 124 MMHG | DIASTOLIC BLOOD PRESSURE: 68 MMHG | BODY MASS INDEX: 34.85 KG/M2 | WEIGHT: 209.4 LBS

## 2023-02-14 DIAGNOSIS — Z13.89 SCREENING FOR BLOOD OR PROTEIN IN URINE: Primary | ICD-10-CM

## 2023-02-14 DIAGNOSIS — O21.9 NAUSEA/VOMITING IN PREGNANCY: ICD-10-CM

## 2023-02-14 LAB
GLUCOSE UR STRIP-MCNC: NEGATIVE MG/DL
PROT UR STRIP-MCNC: NEGATIVE MG/DL

## 2023-02-14 PROCEDURE — 0502F SUBSEQUENT PRENATAL CARE: CPT | Performed by: OBSTETRICS & GYNECOLOGY

## 2023-02-14 RX ORDER — FAMOTIDINE 20 MG/1
20 TABLET, FILM COATED ORAL DAILY
Qty: 30 TABLET | Refills: 1 | Status: SHIPPED | OUTPATIENT
Start: 2023-02-14 | End: 2024-02-14

## 2023-02-14 NOTE — PROGRESS NOTES
Chief Complaint   Patient presents with   • Routine Prenatal Visit     17w0d OB Check      HPI- Pt is 27 y.o.  at 17w0d here for prenatal visit.     ROS-     - No vaginal bleeding    GI- No abdominal pain    /68   Wt 95 kg (209 lb 6.4 oz)   LMP 2022   BMI 34.85 kg/m²   Exam - See flow sheet    Fetal heart rate is normal    Assessment-  Diagnoses and all orders for this visit:    Screening for blood or protein in urine  -     POC Urinalysis Dipstick    Nausea/vomiting in pregnancy  -     famotidine (Pepcid) 20 MG tablet; Take 1 tablet by mouth Daily.    Anatomy ultrasound next visit, patient doing better with nausea      
color consistent with ethnicity/race

## 2023-02-20 NOTE — PROGRESS NOTES
Pt doing well  Good fetal movement, denies LOF, bleeding, contractions  Planned C/S   GBS next visit  STD papers given to Isadora Campoverde s/s labor, [facility]  F/u 1 week with Dr. Garcia   no

## 2023-03-14 ENCOUNTER — ROUTINE PRENATAL (OUTPATIENT)
Dept: OBSTETRICS AND GYNECOLOGY | Age: 28
End: 2023-03-14
Payer: COMMERCIAL

## 2023-03-14 ENCOUNTER — PREP FOR SURGERY (OUTPATIENT)
Dept: OTHER | Facility: HOSPITAL | Age: 28
End: 2023-03-14
Payer: COMMERCIAL

## 2023-03-14 VITALS — DIASTOLIC BLOOD PRESSURE: 74 MMHG | SYSTOLIC BLOOD PRESSURE: 120 MMHG | BODY MASS INDEX: 34.78 KG/M2 | WEIGHT: 209 LBS

## 2023-03-14 DIAGNOSIS — Z34.82 PRENATAL CARE, SUBSEQUENT PREGNANCY, SECOND TRIMESTER: Primary | ICD-10-CM

## 2023-03-14 PROCEDURE — 0502F SUBSEQUENT PRENATAL CARE: CPT | Performed by: OBSTETRICS & GYNECOLOGY

## 2023-03-14 RX ORDER — SODIUM CHLORIDE 0.9 % (FLUSH) 0.9 %
10 SYRINGE (ML) INJECTION EVERY 12 HOURS SCHEDULED
OUTPATIENT
Start: 2023-03-14

## 2023-03-14 RX ORDER — MORPHINE SULFATE 2 MG/ML
2 INJECTION, SOLUTION INTRAMUSCULAR; INTRAVENOUS
OUTPATIENT
Start: 2023-03-14

## 2023-03-14 RX ORDER — ONDANSETRON 4 MG/1
4 TABLET, FILM COATED ORAL EVERY 6 HOURS PRN
OUTPATIENT
Start: 2023-03-14

## 2023-03-14 RX ORDER — METHYLERGONOVINE MALEATE 0.2 MG/ML
200 INJECTION INTRAVENOUS AS NEEDED
OUTPATIENT
Start: 2023-03-14

## 2023-03-14 RX ORDER — CARBOPROST TROMETHAMINE 250 UG/ML
250 INJECTION, SOLUTION INTRAMUSCULAR AS NEEDED
OUTPATIENT
Start: 2023-03-14

## 2023-03-14 RX ORDER — KETOROLAC TROMETHAMINE 30 MG/ML
30 INJECTION, SOLUTION INTRAMUSCULAR; INTRAVENOUS ONCE
OUTPATIENT
Start: 2023-03-14 | End: 2023-03-14

## 2023-03-14 RX ORDER — LIDOCAINE HYDROCHLORIDE 10 MG/ML
5 INJECTION, SOLUTION EPIDURAL; INFILTRATION; INTRACAUDAL; PERINEURAL AS NEEDED
OUTPATIENT
Start: 2023-03-14

## 2023-03-14 RX ORDER — OXYTOCIN/0.9 % SODIUM CHLORIDE 30/500 ML
999 PLASTIC BAG, INJECTION (ML) INTRAVENOUS ONCE
OUTPATIENT
Start: 2023-03-14 | End: 2023-03-14

## 2023-03-14 RX ORDER — SODIUM CHLORIDE 0.9 % (FLUSH) 0.9 %
1-10 SYRINGE (ML) INJECTION AS NEEDED
OUTPATIENT
Start: 2023-03-14

## 2023-03-14 RX ORDER — SODIUM CHLORIDE, SODIUM LACTATE, POTASSIUM CHLORIDE, CALCIUM CHLORIDE 600; 310; 30; 20 MG/100ML; MG/100ML; MG/100ML; MG/100ML
125 INJECTION, SOLUTION INTRAVENOUS CONTINUOUS
OUTPATIENT
Start: 2023-03-14

## 2023-03-14 RX ORDER — ONDANSETRON 2 MG/ML
4 INJECTION INTRAMUSCULAR; INTRAVENOUS EVERY 6 HOURS PRN
OUTPATIENT
Start: 2023-03-14

## 2023-03-14 RX ORDER — OXYTOCIN/0.9 % SODIUM CHLORIDE 30/500 ML
250 PLASTIC BAG, INJECTION (ML) INTRAVENOUS CONTINUOUS
OUTPATIENT
Start: 2023-03-14 | End: 2023-03-14

## 2023-03-14 RX ORDER — ACETAMINOPHEN 500 MG
1000 TABLET ORAL ONCE
OUTPATIENT
Start: 2023-03-14 | End: 2023-03-14

## 2023-03-14 NOTE — PROGRESS NOTES
Chief Complaint   Patient presents with   • Pregnancy Ultrasound     HPI- Pt is 27 y.o.  at 21w0d here for prenatal visit.     ROS-     - No vaginal bleeding    GI- No abdominal pain    /74   Wt 94.8 kg (209 lb)   LMP 2022   BMI 34.78 kg/m²   Exam - See flow sheet    Fetal heart rate is normal    Assessment-  Diagnoses and all orders for this visit:    Prenatal care, subsequent pregnancy, second trimester  -     POC Urinalysis Dipstick    Reviewed findings of ultrasound, will repeat at 32 weeks.  Patient would like to get her repeat  tentatively schedule she does not want a tubal ligation.

## 2023-03-21 ENCOUNTER — TELEPHONE (OUTPATIENT)
Dept: OBSTETRICS AND GYNECOLOGY | Age: 28
End: 2023-03-21

## 2023-03-21 NOTE — TELEPHONE ENCOUNTER
Lm in regards to [facility] paperwork. It is ready but we dont have a fax number. Please advise is pt wants it faxed or pick it up.

## 2023-04-10 ENCOUNTER — TELEPHONE (OUTPATIENT)
Dept: OBSTETRICS AND GYNECOLOGY | Age: 28
End: 2023-04-10

## 2023-04-10 ENCOUNTER — ROUTINE PRENATAL (OUTPATIENT)
Dept: OBSTETRICS AND GYNECOLOGY | Age: 28
End: 2023-04-10
Payer: COMMERCIAL

## 2023-04-10 VITALS — DIASTOLIC BLOOD PRESSURE: 70 MMHG | WEIGHT: 215 LBS | BODY MASS INDEX: 35.78 KG/M2 | SYSTOLIC BLOOD PRESSURE: 128 MMHG

## 2023-04-10 DIAGNOSIS — Z34.82 PRENATAL CARE, SUBSEQUENT PREGNANCY, SECOND TRIMESTER: Primary | ICD-10-CM

## 2023-04-10 PROCEDURE — 0502F SUBSEQUENT PRENATAL CARE: CPT | Performed by: OBSTETRICS & GYNECOLOGY

## 2023-04-10 PROCEDURE — 81002 URINALYSIS NONAUTO W/O SCOPE: CPT | Performed by: OBSTETRICS & GYNECOLOGY

## 2023-04-10 RX ORDER — PRENATAL VIT NO.126/IRON/FOLIC 28MG-0.8MG
1 TABLET ORAL DAILY
COMMUNITY

## 2023-04-10 RX ORDER — BUDESONIDE 180 UG/1
AEROSOL, POWDER RESPIRATORY (INHALATION)
COMMUNITY
Start: 2023-02-21

## 2023-04-10 RX ORDER — CETIRIZINE HYDROCHLORIDE 10 MG/1
TABLET ORAL
COMMUNITY
Start: 2023-02-21

## 2023-04-10 NOTE — TELEPHONE ENCOUNTER
Cesia Ambrose  Female, 27 y.o., 1995  MRN:   [id number]  CSN:   [id number]  Phone:   [phone]      PATIENT CALLING IN BECAUSE SHE HAD MISSED HER APPT THIS MORNING AT 10.30. PATIENT WOULD STILL LIKE TO COME IN IF POSSIBLE . DR. GOMEZ DID HAVE 2 AVAILABLE AT THE 2 CLOCK TIME AND 2.:45 TIME. PLEASE CALL PATIENT BACK TO GET HER SCHEDULED WITH ANY AVAILABLE DATE. AT THE NUMBER LISTED ABOVE    THANK YOU

## 2023-04-10 NOTE — PROGRESS NOTES
Chief Complaint   Patient presents with   • Routine Prenatal Visit     Discuss med's to take before the      HPI- Pt is 27 y.o.  at 24w6d here for prenatal visit.     ROS-     - No vaginal bleeding    GI- No abdominal pain    /70   Wt 97.5 kg (215 lb)   LMP 2022   BMI 35.78 kg/m²   Exam - See flow sheet    Fetal heart rate is normal    Assessment-  Diagnoses and all orders for this visit:    Prenatal care, subsequent pregnancy, second trimester  -     POC Urinalysis Dipstick    Other orders  -     cetirizine (zyrTEC) 10 MG tablet  -     Pulmicort Flexhaler 180 MCG/ACT inhaler  -     prenatal vitamin (prenatal, CLASSIC, vitamin) tablet; Take 1 tablet by mouth Daily.    Overall patient feels well good fetal activity no complaints today  1 hour glucose, hemoglobin, Tdap at next visit

## 2023-04-18 ENCOUNTER — TELEPHONE (OUTPATIENT)
Dept: OBSTETRICS AND GYNECOLOGY | Age: 28
End: 2023-04-18
Payer: COMMERCIAL

## 2023-04-18 ENCOUNTER — HOSPITAL ENCOUNTER (EMERGENCY)
Facility: HOSPITAL | Age: 28
Discharge: HOME | End: 2023-04-18
Attending: OBSTETRICS & GYNECOLOGY | Admitting: OBSTETRICS & GYNECOLOGY
Payer: COMMERCIAL

## 2023-04-18 ENCOUNTER — APPOINTMENT (OUTPATIENT)
Dept: ULTRASOUND IMAGING | Facility: HOSPITAL | Age: 28
End: 2023-04-18
Payer: COMMERCIAL

## 2023-04-18 VITALS
SYSTOLIC BLOOD PRESSURE: 123 MMHG | HEART RATE: 91 BPM | RESPIRATION RATE: 17 BRPM | BODY MASS INDEX: 35.78 KG/M2 | DIASTOLIC BLOOD PRESSURE: 64 MMHG | TEMPERATURE: 98.5 F | HEIGHT: 65 IN

## 2023-04-18 LAB
ALBUMIN SERPL-MCNC: 3.2 G/DL (ref 3.5–5.2)
ALBUMIN/GLOB SERPL: 1 G/DL
ALP SERPL-CCNC: 58 U/L (ref 39–117)
ALT SERPL W P-5'-P-CCNC: 19 U/L (ref 1–33)
AMYLASE SERPL-CCNC: 36 U/L (ref 28–100)
ANION GAP SERPL CALCULATED.3IONS-SCNC: 10 MMOL/L (ref 5–15)
AST SERPL-CCNC: 12 U/L (ref 1–32)
BASOPHILS # BLD AUTO: 0.06 10*3/MM3 (ref 0–0.2)
BASOPHILS NFR BLD AUTO: 0.5 % (ref 0–1.5)
BILIRUB SERPL-MCNC: <0.2 MG/DL (ref 0–1.2)
BILIRUB UR QL STRIP: NEGATIVE
BUN SERPL-MCNC: 5 MG/DL (ref 6–20)
BUN/CREAT SERPL: 12.5 (ref 7–25)
CALCIUM SPEC-SCNC: 8.7 MG/DL (ref 8.6–10.5)
CHLORIDE SERPL-SCNC: 106 MMOL/L (ref 98–107)
CLARITY UR: CLEAR
CO2 SERPL-SCNC: 20 MMOL/L (ref 22–29)
COLOR UR: YELLOW
CREAT SERPL-MCNC: 0.4 MG/DL (ref 0.57–1)
CREAT UR-MCNC: 15.6 MG/DL
D-LACTATE SERPL-SCNC: 1.1 MMOL/L (ref 0.5–2)
DEPRECATED RDW RBC AUTO: 40.5 FL (ref 37–54)
EGFRCR SERPLBLD CKD-EPI 2021: 138.5 ML/MIN/1.73
EOSINOPHIL # BLD AUTO: 0.32 10*3/MM3 (ref 0–0.4)
EOSINOPHIL NFR BLD AUTO: 2.8 % (ref 0.3–6.2)
ERYTHROCYTE [DISTWIDTH] IN BLOOD BY AUTOMATED COUNT: 13.1 % (ref 12.3–15.4)
GLOBULIN UR ELPH-MCNC: 3.3 GM/DL
GLUCOSE SERPL-MCNC: 88 MG/DL (ref 65–99)
GLUCOSE UR STRIP-MCNC: NEGATIVE MG/DL
HCT VFR BLD AUTO: 34.7 % (ref 34–46.6)
HGB BLD-MCNC: 11.4 G/DL (ref 12–15.9)
HGB UR QL STRIP.AUTO: NEGATIVE
IMM GRANULOCYTES # BLD AUTO: 0.12 10*3/MM3 (ref 0–0.05)
IMM GRANULOCYTES NFR BLD AUTO: 1.1 % (ref 0–0.5)
KETONES UR QL STRIP: NEGATIVE
LEUKOCYTE ESTERASE UR QL STRIP.AUTO: NEGATIVE
LIPASE SERPL-CCNC: 17 U/L (ref 13–60)
LYMPHOCYTES # BLD AUTO: 1.77 10*3/MM3 (ref 0.7–3.1)
LYMPHOCYTES NFR BLD AUTO: 15.6 % (ref 19.6–45.3)
MCH RBC QN AUTO: 27.8 PG (ref 26.6–33)
MCHC RBC AUTO-ENTMCNC: 32.9 G/DL (ref 31.5–35.7)
MCV RBC AUTO: 84.6 FL (ref 79–97)
MONOCYTES # BLD AUTO: 0.68 10*3/MM3 (ref 0.1–0.9)
MONOCYTES NFR BLD AUTO: 6 % (ref 5–12)
NEUTROPHILS NFR BLD AUTO: 74 % (ref 42.7–76)
NEUTROPHILS NFR BLD AUTO: 8.4 10*3/MM3 (ref 1.7–7)
NITRITE UR QL STRIP: NEGATIVE
NRBC BLD AUTO-RTO: 0 /100 WBC (ref 0–0.2)
PH UR STRIP.AUTO: 7.5 [PH] (ref 5–8)
PLATELET # BLD AUTO: 339 10*3/MM3 (ref 140–450)
PMV BLD AUTO: 9.4 FL (ref 6–12)
POTASSIUM SERPL-SCNC: 4 MMOL/L (ref 3.5–5.2)
PROT ?TM UR-MCNC: <4 MG/DL
PROT SERPL-MCNC: 6.5 G/DL (ref 6–8.5)
PROT UR QL STRIP: NEGATIVE
PROT/CREAT UR: NORMAL MG/G{CREAT}
RBC # BLD AUTO: 4.1 10*6/MM3 (ref 3.77–5.28)
SODIUM SERPL-SCNC: 136 MMOL/L (ref 136–145)
SP GR UR STRIP: <1.005 (ref 1–1.03)
UROBILINOGEN UR QL STRIP: ABNORMAL
WBC NRBC COR # BLD: 11.35 10*3/MM3 (ref 3.4–10.8)

## 2023-04-18 PROCEDURE — 99283 EMERGENCY DEPT VISIT LOW MDM: CPT | Performed by: OBSTETRICS & GYNECOLOGY

## 2023-04-18 PROCEDURE — 84156 ASSAY OF PROTEIN URINE: CPT | Performed by: OBSTETRICS & GYNECOLOGY

## 2023-04-18 PROCEDURE — 83690 ASSAY OF LIPASE: CPT | Performed by: OBSTETRICS & GYNECOLOGY

## 2023-04-18 PROCEDURE — 81003 URINALYSIS AUTO W/O SCOPE: CPT | Performed by: OBSTETRICS & GYNECOLOGY

## 2023-04-18 PROCEDURE — 83605 ASSAY OF LACTIC ACID: CPT | Performed by: OBSTETRICS & GYNECOLOGY

## 2023-04-18 PROCEDURE — 99284 EMERGENCY DEPT VISIT MOD MDM: CPT | Performed by: OBSTETRICS & GYNECOLOGY

## 2023-04-18 PROCEDURE — 82570 ASSAY OF URINE CREATININE: CPT | Performed by: OBSTETRICS & GYNECOLOGY

## 2023-04-18 PROCEDURE — 59025 FETAL NON-STRESS TEST: CPT

## 2023-04-18 PROCEDURE — 82150 ASSAY OF AMYLASE: CPT | Performed by: OBSTETRICS & GYNECOLOGY

## 2023-04-18 PROCEDURE — 80053 COMPREHEN METABOLIC PANEL: CPT | Performed by: OBSTETRICS & GYNECOLOGY

## 2023-04-18 PROCEDURE — 85025 COMPLETE CBC W/AUTO DIFF WBC: CPT | Performed by: OBSTETRICS & GYNECOLOGY

## 2023-04-18 PROCEDURE — 87086 URINE CULTURE/COLONY COUNT: CPT | Performed by: OBSTETRICS & GYNECOLOGY

## 2023-04-18 PROCEDURE — 76705 ECHO EXAM OF ABDOMEN: CPT

## 2023-04-18 RX ORDER — LIDOCAINE HYDROCHLORIDE 20 MG/ML
15 SOLUTION OROPHARYNGEAL ONCE
Status: COMPLETED | OUTPATIENT
Start: 2023-04-18 | End: 2023-04-18

## 2023-04-18 RX ORDER — SODIUM CHLORIDE 0.9 % (FLUSH) 0.9 %
10 SYRINGE (ML) INJECTION EVERY 8 HOURS
Status: DISCONTINUED | OUTPATIENT
Start: 2023-04-18 | End: 2023-04-18 | Stop reason: HOSPADM

## 2023-04-18 RX ORDER — ALUMINA, MAGNESIA, AND SIMETHICONE 2400; 2400; 240 MG/30ML; MG/30ML; MG/30ML
15 SUSPENSION ORAL ONCE
Status: COMPLETED | OUTPATIENT
Start: 2023-04-18 | End: 2023-04-18

## 2023-04-18 RX ORDER — SODIUM CHLORIDE 0.9 % (FLUSH) 0.9 %
10 SYRINGE (ML) INJECTION AS NEEDED
Status: DISCONTINUED | OUTPATIENT
Start: 2023-04-18 | End: 2023-04-18 | Stop reason: HOSPADM

## 2023-04-18 RX ADMIN — ALUMINUM HYDROXIDE, MAGNESIUM HYDROXIDE, AND DIMETHICONE 15 ML: 400; 400; 40 SUSPENSION ORAL at 11:44

## 2023-04-18 RX ADMIN — LIDOCAINE HYDROCHLORIDE 15 ML: 20 SOLUTION ORAL at 11:44

## 2023-04-18 RX ADMIN — SODIUM CHLORIDE, POTASSIUM CHLORIDE, SODIUM LACTATE AND CALCIUM CHLORIDE 1000 ML: 600; 310; 30; 20 INJECTION, SOLUTION INTRAVENOUS at 11:32

## 2023-04-18 NOTE — TELEPHONE ENCOUNTER
Pt called c/o upper abdominal pain that started this morning. She has vomited once, says the pain is constant, and it hurts to move. She is wanting to know what she should do. Please advise.

## 2023-04-18 NOTE — OBED NOTES
FADI Note OB    Patient Name: Cesia Ambrose  YOB: 1995  MRN: [id number]  Admission Date: 2023  9:52 AM  Date of Service: 2023    Chief Complaint: Abdominal Pain (FADI-  at 26wks arrives for right upper abdominal pain. Pt reports pain started at 0530 this AM. Pt reports constant ache with intermittent sharp pain that increases with movement and deep breathes. Pt reports +FM and denies LOF, VB. Abdomen palpates soft. )        Subjective     Cesia Ambrose is a 27 y.o. female  at 26w0d with Estimated Date of Delivery: 23 who presents with the chief complaint listed above.Pt points to her epigastric area as source of worst pain but said it tends to radiate to her right upper abdomen. Pt reports has had indigestion before and this doesn't feel like that.Pt did have gallbladder out Oct 21 and reports this pain feels like when she had her GB episodes. Pt reports ate at 7 AM but threw up, hasn't eaten since. No change in bowel, no fever, no bladder or respiratory complaints.  and 2 year old did just have a GI bug past 3 weeks.    Pt reports RUQ pain is easing and wants to go home     She sees Chavez Garcia MD for her prenatal care. Her pregnancy has been complicated by:  Obese, prior CS, asthma, migraines,hx of GBBS pos    She describes fetal movement as normal.  She denies rupture of membranes.  She denies vaginal bleeding. She is not feeling contractions.        Objective   Patient Active Problem List    Diagnosis    • * delivery delivered [O82]    • Nausea/vomiting in pregnancy [O21.9]    • Gallstones [K80.20]    • Calculus of gallbladder with chronic cholecystitis without obstruction [K80.10]    • Asthma [J45.909]    • Increased BMI [R63.8]    • Gastroesophageal reflux disease without esophagitis [K21.9]    • BRCA negative [Z13.71]    • Family history of breast cancer [Z80.3]    • Migraine without aura and with status migrainosus, not intractable [G43.001]      "    OB History    Para Term  AB Living   2 1 1 0 0 1   SAB IAB Ectopic Molar Multiple Live Births   0 0 0 0 0 1      # Outcome Date GA Lbr Pedro Pablo/2nd Weight Sex Delivery Anes PTL Lv   2 Current            1 Term 21 39w1d  3825 g (8 lb 6.9 oz) M CS-LTranv Spinal N ERICK      Birth Comments: kieran 1       Name: KATYA BANEGAS      Apgar1: 8  Apgar5: 9        Past Medical History:   Diagnosis Date   • Anemia    • Anxiety    • Asthma    • Environmental allergies    • GERD (gastroesophageal reflux disease)    • Migraine    • OCD (obsessive compulsive disorder)        Past Surgical History:   Procedure Laterality Date   •  SECTION N/A 2021    Procedure:  SECTION PRIMARY;  Surgeon: Chavez Garcia MD;  Location: [facility] LABOR DELIVERY;  Service: Obstetrics/Gynecology;  Laterality: N/A;   • CHOLECYSTECTOMY     • CYST REMOVAL     • LAPAROSCOPIC CHOLECYSTECTOMY  1030   • NASAL SEPTUM SURGERY     • WISDOM TOOTH EXTRACTION         No current facility-administered medications on file prior to encounter.     Current Outpatient Medications on File Prior to Encounter   Medication Sig Dispense Refill   • albuterol (PROVENTIL) (2.5 MG/3ML) 0.083% nebulizer solution U 1 UNIT VIA NEB QID PRN     • albuterol sulfate  (90 Base) MCG/ACT inhaler INHALE 2 PUFFS QID PRF SOB     • famotidine (Pepcid) 20 MG tablet Take 1 tablet by mouth Daily. 30 tablet 1   • levalbuterol (XOPENEX HFA) 45 MCG/ACT inhaler          Allergies   Allergen Reactions   • Morphine Nausea And Vomiting     Pt states, \"when I had my son, they told me my reaction was normal and that I am not really allergic to it. I got hot and started vomiting.\"   • Penicillins Rash       Family History   Problem Relation Age of Onset   • Irritable bowel syndrome Maternal Grandmother    • Crohn's disease Brother    • Prostate cancer Father    • Breast cancer Paternal Aunt    • Prostate cancer Paternal Uncle    • Prostate cancer Paternal " "Grandfather        Social History     Socioeconomic History   • Marital status:    Tobacco Use   • Smoking status: Former     Packs/day: 0.25     Years: 10.00     Pack years: 2.50     Types: Cigarettes, Electronic Cigarette     Quit date: 2016     Years since quittin.2   • Smokeless tobacco: Never   Vaping Use   • Vaping Use: Never used   Substance and Sexual Activity   • Alcohol use: Not Currently   • Drug use: No   • Sexual activity: Yes     Partners: Male     Birth control/protection: None           Review of Systems   Constitutional: Negative for chills and fever.   HENT: Negative.    Eyes: Negative for photophobia and visual disturbance.   Respiratory: Negative for shortness of breath.    Cardiovascular: Negative for chest pain.   Gastrointestinal: Positive for abdominal pain. Negative for nausea.   Psychiatric/Behavioral: The patient is not nervous/anxious.           PHYSICAL EXAM:      VITAL SIGNS:  Vitals:    23 1015   BP: 123/64   BP Location: Right arm   Patient Position: Lying   Pulse: 91   Resp: 17   Temp: 98.5 °F (36.9 °C)   TempSrc: Oral   Height: 165.1 cm (65\")            FHT'S:    130 with moderate variability and accels                                     PHYSICAL EXAM:      General: well developed; well nourished  no acute distress   Heart: Not performed.   Lungs   breathing is unlabored   Abdomen: Gravid and non tender     Extremities: trace edema, DTRs 1 plus, no clonus       Cervix: was not checked.        Contractions:   irregular                    LABS AND TESTING ORDERED:  1. Uterine and fetal monitoring  2. Urinalysis  3. CBC, CMP, urine P:C, lipase, amylase, lactic acid, GB USG    LAB RESULTS:    No results found for this or any previous visit (from the past 24 hour(s)).    Lab Results   Component Value Date    ABO A 2022    RH Positive 2022       Lab Results   Component Value Date    STREPGPB Positive (A) 2021                 External Prenatal " Results     Pregnancy Outside Results - Transcribed From Office Records - See Scanned Records For Details     Test Value Date Time    ABO  A  22 1525    Rh  Positive  22 1525    Antibody Screen  Negative  22 1525    Varicella IgG       Rubella  3.48 index 22 1525    Hgb  13.8 g/dL 22 1525    Hct  41.3 % 22 1525    Glucose Fasting GTT       Glucose Tolerance Test 1 hour       Glucose Tolerance Test 3 hour       Gonorrhea (discrete)  Negative  22 1456    Chlamydia (discrete)  Negative  22 1456    RPR  Non Reactive  22 1525    VDRL       Syphilis Antibody       HBsAg  Negative  22 1525    Herpes Simplex Virus PCR       Herpes Simplex VIrus Culture       HIV  Non Reactive  22 1525    Hep C RNA Quant PCR       Hep C Antibody  <0.1 s/co ratio 22 1525    AFP       Group B Strep  Positive  21 1314    GBS Susceptibility to Clindamycin       GBS Susceptibility to Erythromycin       Fetal Fibronectin  Negative  21 1311    Genetic Testing, Maternal Blood             Drug Screening     Test Value Date Time    Urine Drug Screen       Amphetamine Screen       Barbiturate Screen       Benzodiazepine Screen       Methadone Screen       Phencyclidine Screen       Opiates Screen       THC Screen       Cocaine Screen       Propoxyphene Screen       Buprenorphine Screen       Methamphetamine Screen       Oxycodone Screen       Tricyclic Antidepressants Screen             Legend    ^: Historical                          Impression:   @ 26w0d .  Final Diagnosis: RUQ pain like GB attacks,labs and USG WNL, possible residual GI disease    Plan:  1. IVF bolus, review labs, USG  2. Plan of care has been reviewed with patient along with risks, benefits of treatment.   All questions have been answered. Call health care provider for any further concerns and keep office appointments.  3. Encourage pt to get referral to see GI for further assessment or future  episodes. Encourage pt to avoid fatty foods and small frequency ,easle. Comfort measures and PTL precautions reviewed      I discussed the patients findings and my recommendations with patient, family and nursing staff      Evy Aguilar MD  4/18/2023  11:03 EDT

## 2023-04-18 NOTE — NURSING NOTE
DC instructions reviewed with patient. Patient acknowledges. Encouraged to keep next OB apt and to follow up with PCP.

## 2023-04-18 NOTE — DISCHARGE INSTRUCTIONS
Discharged to home. Encouraged to keep next OB appointment. Return to Labor and Delivery if contractions are less than 5 minutes apart and are getting stronger in intensity, leaking of fluid. vaginal bleeding or decreased fetal movement.

## 2023-04-19 LAB — BACTERIA SPEC AEROBE CULT: NO GROWTH

## 2023-05-09 ENCOUNTER — ROUTINE PRENATAL (OUTPATIENT)
Dept: OBSTETRICS AND GYNECOLOGY | Age: 28
End: 2023-05-09
Payer: COMMERCIAL

## 2023-05-09 VITALS — BODY MASS INDEX: 36.61 KG/M2 | DIASTOLIC BLOOD PRESSURE: 70 MMHG | SYSTOLIC BLOOD PRESSURE: 118 MMHG | WEIGHT: 220 LBS

## 2023-05-09 DIAGNOSIS — Z34.82 PRENATAL CARE, SUBSEQUENT PREGNANCY, SECOND TRIMESTER: Primary | ICD-10-CM

## 2023-05-09 LAB
CLARITY, POC: CLEAR
COLOR UR: YELLOW
GLUCOSE 1H P 50 G GLC PO SERPL-MCNC: 83 MG/DL (ref 65–139)
GLUCOSE UR STRIP-MCNC: NEGATIVE MG/DL
HGB BLD-MCNC: 11.6 G/DL (ref 12–15.9)
PROT UR STRIP-MCNC: NEGATIVE MG/DL

## 2023-05-09 NOTE — PROGRESS NOTES
Chief Complaint   Patient presents with   • Routine Prenatal Visit     Ob: 1 hr gtt     HPI- Pt is 27 y.o.  at 29w0d here for prenatal visit.     ROS-     - No vaginal bleeding    GI- No abdominal pain    /70   Wt 99.8 kg (220 lb)   LMP 2022   BMI 36.61 kg/m²   Exam - See flow sheet    Fetal heart rate is normal    Assessment-  Diagnoses and all orders for this visit:    Prenatal care, subsequent pregnancy, second trimester  -     Gestational Screen 1 Hr ([facility])  -     Hemoglobin  -     POC Urinalysis Dipstick    1 hour glucose, check hemoglobin, pertussis today.  Repeat , no PPS scheduled  Reviewed third trimester concerns and bullet points.

## 2023-05-30 ENCOUNTER — ROUTINE PRENATAL (OUTPATIENT)
Dept: OBSTETRICS AND GYNECOLOGY | Age: 28
End: 2023-05-30

## 2023-05-30 VITALS — WEIGHT: 223 LBS | BODY MASS INDEX: 37.11 KG/M2 | DIASTOLIC BLOOD PRESSURE: 74 MMHG | SYSTOLIC BLOOD PRESSURE: 122 MMHG

## 2023-05-30 DIAGNOSIS — Z3A.32 32 WEEKS GESTATION OF PREGNANCY: ICD-10-CM

## 2023-05-30 DIAGNOSIS — O99.013 MATERNAL IRON DEFICIENCY ANEMIA AFFECTING PREGNANCY IN THIRD TRIMESTER, ANTEPARTUM: ICD-10-CM

## 2023-05-30 DIAGNOSIS — R63.8 INCREASED BMI: ICD-10-CM

## 2023-05-30 DIAGNOSIS — Z98.891 H/O CESAREAN SECTION: ICD-10-CM

## 2023-05-30 DIAGNOSIS — D50.9 MATERNAL IRON DEFICIENCY ANEMIA AFFECTING PREGNANCY IN THIRD TRIMESTER, ANTEPARTUM: ICD-10-CM

## 2023-05-30 DIAGNOSIS — Z13.89 SCREENING FOR BLOOD OR PROTEIN IN URINE: Primary | ICD-10-CM

## 2023-05-30 LAB
BILIRUB BLD-MCNC: NEGATIVE MG/DL
CLARITY, POC: CLEAR
COLOR UR: YELLOW
GLUCOSE UR STRIP-MCNC: NEGATIVE MG/DL
KETONES UR QL: NEGATIVE
LEUKOCYTE EST, POC: NEGATIVE
NITRITE UR-MCNC: NEGATIVE MG/ML
PH UR: 1 [PH] (ref 5–8)
PROT UR STRIP-MCNC: NEGATIVE MG/DL
RBC # UR STRIP: NEGATIVE /UL
SP GR UR: 1.01 (ref 1–1.03)
UROBILINOGEN UR QL: NORMAL

## 2023-05-30 NOTE — PROGRESS NOTES
Chief Complaint   Patient presents with   • Routine Prenatal Visit     32 week ob visit       HPI: 27 y.o.  at 32w0d gestation  She is good  Has good, very active, FM  No c/o  utd on labs and has received tdap      Vitals:    23 0938   BP: 122/74   Weight: 101 kg (223 lb)       ROS:  GI:  Negative  : na  Pulmonary: Negative     A/P  1. Intrauterine pregnancy at 32w0d   2. Pregnancy Risk:  NORMAL    Diagnoses and all orders for this visit:    1. Screening for blood or protein in urine (Primary)  -     POC Urinalysis Dipstick, Multipro    2. 32 weeks gestation of pregnancy  -     POC Urinalysis Dipstick, Multipro    3. Increased BMI    4. Maternal iron deficiency anemia affecting pregnancy in third trimester, antepartum    5. H/O  section        -----------------------  PLAN:   Return in about 2 weeks (around 2023) for as scheduled.      JAIME Ortega  2023 10:00 EDT

## 2023-06-04 RX ORDER — LEVALBUTEROL TARTRATE 45 UG/1
AEROSOL, METERED ORAL
Qty: 15 G | Refills: 1 | Status: SHIPPED | OUTPATIENT
Start: 2023-06-04

## 2023-06-14 ENCOUNTER — ROUTINE PRENATAL (OUTPATIENT)
Dept: OBSTETRICS AND GYNECOLOGY | Age: 28
End: 2023-06-14
Payer: COMMERCIAL

## 2023-06-14 VITALS — BODY MASS INDEX: 37.61 KG/M2 | DIASTOLIC BLOOD PRESSURE: 76 MMHG | SYSTOLIC BLOOD PRESSURE: 132 MMHG | WEIGHT: 226 LBS

## 2023-06-14 DIAGNOSIS — Z34.83 PRENATAL CARE, SUBSEQUENT PREGNANCY, THIRD TRIMESTER: Primary | ICD-10-CM

## 2023-06-14 NOTE — PROGRESS NOTES
Reports excellent fetal activity.  Scheduled for repeat   As continues to be greater than dates  Return the office in 2 weeks,  Repeat anatomy ultrasound in 3 weeks to look at the 4 chambers of the heart.

## 2023-07-05 PROBLEM — O99.820 GBS (GROUP B STREPTOCOCCUS CARRIER), +RV CULTURE, CURRENTLY PREGNANT: Status: ACTIVE | Noted: 2023-07-05

## 2023-07-18 ENCOUNTER — HOSPITAL ENCOUNTER (INPATIENT)
Facility: HOSPITAL | Age: 28
LOS: 2 days | Discharge: HOME | End: 2023-07-20
Attending: OBSTETRICS & GYNECOLOGY | Admitting: OBSTETRICS & GYNECOLOGY
Payer: COMMERCIAL

## 2023-07-18 DIAGNOSIS — Z98.891 S/P REPEAT LOW TRANSVERSE C-SECTION: Primary | ICD-10-CM

## 2023-07-18 LAB
ABO GROUP BLD: NORMAL
BASOPHILS # BLD AUTO: 0.06 10*3/MM3 (ref 0–0.2)
BASOPHILS NFR BLD AUTO: 0.4 % (ref 0–1.5)
BLD GP AB SCN SERPL QL: NEGATIVE
DEPRECATED RDW RBC AUTO: 40.8 FL (ref 37–54)
EOSINOPHIL # BLD AUTO: 0.36 10*3/MM3 (ref 0–0.4)
EOSINOPHIL NFR BLD AUTO: 2.6 % (ref 0.3–6.2)
ERYTHROCYTE [DISTWIDTH] IN BLOOD BY AUTOMATED COUNT: 13.9 % (ref 12.3–15.4)
HCT VFR BLD AUTO: 35.7 % (ref 34–46.6)
HGB BLD-MCNC: 11.6 G/DL (ref 12–15.9)
IMM GRANULOCYTES # BLD AUTO: 0.11 10*3/MM3 (ref 0–0.05)
IMM GRANULOCYTES NFR BLD AUTO: 0.8 % (ref 0–0.5)
LYMPHOCYTES # BLD AUTO: 1.85 10*3/MM3 (ref 0.7–3.1)
LYMPHOCYTES NFR BLD AUTO: 13.4 % (ref 19.6–45.3)
MCH RBC QN AUTO: 26.5 PG (ref 26.6–33)
MCHC RBC AUTO-ENTMCNC: 32.5 G/DL (ref 31.5–35.7)
MCV RBC AUTO: 81.7 FL (ref 79–97)
MONOCYTES # BLD AUTO: 0.88 10*3/MM3 (ref 0.1–0.9)
MONOCYTES NFR BLD AUTO: 6.4 % (ref 5–12)
NEUTROPHILS NFR BLD AUTO: 10.54 10*3/MM3 (ref 1.7–7)
NEUTROPHILS NFR BLD AUTO: 76.4 % (ref 42.7–76)
NRBC BLD AUTO-RTO: 0 /100 WBC (ref 0–0.2)
PLATELET # BLD AUTO: 279 10*3/MM3 (ref 140–450)
PMV BLD AUTO: 10.4 FL (ref 6–12)
RBC # BLD AUTO: 4.37 10*6/MM3 (ref 3.77–5.28)
RH BLD: POSITIVE
T&S EXPIRATION DATE: NORMAL
WBC NRBC COR # BLD: 13.8 10*3/MM3 (ref 3.4–10.8)

## 2023-07-18 PROCEDURE — 0 CLINDAMYCIN 900 MG/50ML SOLUTION: Performed by: OBSTETRICS & GYNECOLOGY

## 2023-07-18 PROCEDURE — 86850 RBC ANTIBODY SCREEN: CPT | Performed by: OBSTETRICS & GYNECOLOGY

## 2023-07-18 PROCEDURE — 86901 BLOOD TYPING SEROLOGIC RH(D): CPT | Performed by: OBSTETRICS & GYNECOLOGY

## 2023-07-18 PROCEDURE — 25010000002 KETOROLAC TROMETHAMINE PER 15 MG: Performed by: OBSTETRICS & GYNECOLOGY

## 2023-07-18 PROCEDURE — 85025 COMPLETE CBC W/AUTO DIFF WBC: CPT | Performed by: OBSTETRICS & GYNECOLOGY

## 2023-07-18 PROCEDURE — S0260 H&P FOR SURGERY: HCPCS | Performed by: OBSTETRICS & GYNECOLOGY

## 2023-07-18 PROCEDURE — 59510 CESAREAN DELIVERY: CPT | Performed by: OBSTETRICS & GYNECOLOGY

## 2023-07-18 PROCEDURE — 86900 BLOOD TYPING SEROLOGIC ABO: CPT | Performed by: OBSTETRICS & GYNECOLOGY

## 2023-07-18 PROCEDURE — 25010000002 ONDANSETRON PER 1 MG: Performed by: ANESTHESIOLOGY

## 2023-07-18 RX ORDER — ONDANSETRON 4 MG/1
4 TABLET, FILM COATED ORAL EVERY 6 HOURS PRN
Status: DISCONTINUED | OUTPATIENT
Start: 2023-07-18 | End: 2023-07-20 | Stop reason: HOSPADM

## 2023-07-18 RX ORDER — SODIUM CHLORIDE 0.9 % (FLUSH) 0.9 %
10 SYRINGE (ML) INJECTION EVERY 12 HOURS SCHEDULED
Status: DISCONTINUED | OUTPATIENT
Start: 2023-07-18 | End: 2023-07-18

## 2023-07-18 RX ORDER — DROPERIDOL 2.5 MG/ML
0.62 INJECTION, SOLUTION INTRAMUSCULAR; INTRAVENOUS
Status: DISCONTINUED | OUTPATIENT
Start: 2023-07-18 | End: 2023-07-20 | Stop reason: HOSPADM

## 2023-07-18 RX ORDER — BISACODYL 10 MG
10 SUPPOSITORY, RECTAL RECTAL DAILY PRN
Status: DISCONTINUED | OUTPATIENT
Start: 2023-07-18 | End: 2023-07-20 | Stop reason: HOSPADM

## 2023-07-18 RX ORDER — FAMOTIDINE 10 MG/ML
20 INJECTION, SOLUTION INTRAVENOUS ONCE AS NEEDED
Status: COMPLETED | OUTPATIENT
Start: 2023-07-18 | End: 2023-07-18

## 2023-07-18 RX ORDER — ONDANSETRON 2 MG/ML
4 INJECTION INTRAMUSCULAR; INTRAVENOUS ONCE AS NEEDED
Status: DISCONTINUED | OUTPATIENT
Start: 2023-07-18 | End: 2023-07-20 | Stop reason: HOSPADM

## 2023-07-18 RX ORDER — NALOXONE HCL 0.4 MG/ML
0.1 VIAL (ML) INJECTION
Status: DISCONTINUED | OUTPATIENT
Start: 2023-07-18 | End: 2023-07-20 | Stop reason: HOSPADM

## 2023-07-18 RX ORDER — DIPHENHYDRAMINE HCL 25 MG
25 CAPSULE ORAL EVERY 4 HOURS PRN
Status: DISCONTINUED | OUTPATIENT
Start: 2023-07-18 | End: 2023-07-20 | Stop reason: HOSPADM

## 2023-07-18 RX ORDER — SIMETHICONE 80 MG
80 TABLET,CHEWABLE ORAL 4 TIMES DAILY PRN
Status: DISCONTINUED | OUTPATIENT
Start: 2023-07-18 | End: 2023-07-20 | Stop reason: HOSPADM

## 2023-07-18 RX ORDER — DIPHENHYDRAMINE HYDROCHLORIDE 50 MG/ML
25 INJECTION INTRAMUSCULAR; INTRAVENOUS EVERY 4 HOURS PRN
Status: DISCONTINUED | OUTPATIENT
Start: 2023-07-18 | End: 2023-07-20 | Stop reason: HOSPADM

## 2023-07-18 RX ORDER — ONDANSETRON 2 MG/ML
4 INJECTION INTRAMUSCULAR; INTRAVENOUS EVERY 6 HOURS PRN
Status: DISCONTINUED | OUTPATIENT
Start: 2023-07-18 | End: 2023-07-18

## 2023-07-18 RX ORDER — DOCUSATE SODIUM 100 MG/1
100 CAPSULE, LIQUID FILLED ORAL 2 TIMES DAILY PRN
Status: DISCONTINUED | OUTPATIENT
Start: 2023-07-18 | End: 2023-07-20 | Stop reason: HOSPADM

## 2023-07-18 RX ORDER — ACETAMINOPHEN 500 MG
1000 TABLET ORAL ONCE
Status: COMPLETED | OUTPATIENT
Start: 2023-07-18 | End: 2023-07-18

## 2023-07-18 RX ORDER — ONDANSETRON 2 MG/ML
4 INJECTION INTRAMUSCULAR; INTRAVENOUS EVERY 6 HOURS PRN
Status: DISCONTINUED | OUTPATIENT
Start: 2023-07-18 | End: 2023-07-20 | Stop reason: HOSPADM

## 2023-07-18 RX ORDER — METHYLERGONOVINE MALEATE 0.2 MG/ML
200 INJECTION INTRAVENOUS AS NEEDED
Status: DISCONTINUED | OUTPATIENT
Start: 2023-07-18 | End: 2023-07-18

## 2023-07-18 RX ORDER — DIPHENHYDRAMINE HYDROCHLORIDE 50 MG/ML
25 INJECTION INTRAMUSCULAR; INTRAVENOUS NIGHTLY PRN
Status: DISCONTINUED | OUTPATIENT
Start: 2023-07-18 | End: 2023-07-20 | Stop reason: HOSPADM

## 2023-07-18 RX ORDER — KETOROLAC TROMETHAMINE 15 MG/ML
15 INJECTION, SOLUTION INTRAMUSCULAR; INTRAVENOUS EVERY 6 HOURS
Status: COMPLETED | OUTPATIENT
Start: 2023-07-18 | End: 2023-07-19

## 2023-07-18 RX ORDER — CARBOPROST TROMETHAMINE 250 UG/ML
250 INJECTION, SOLUTION INTRAMUSCULAR AS NEEDED
Status: DISCONTINUED | OUTPATIENT
Start: 2023-07-18 | End: 2023-07-18

## 2023-07-18 RX ORDER — MORPHINE SULFATE 2 MG/ML
2 INJECTION, SOLUTION INTRAMUSCULAR; INTRAVENOUS
Status: DISCONTINUED | OUTPATIENT
Start: 2023-07-18 | End: 2023-07-18

## 2023-07-18 RX ORDER — MORPHINE SULFATE 2 MG/ML
1 INJECTION, SOLUTION INTRAMUSCULAR; INTRAVENOUS EVERY 4 HOURS PRN
Status: DISCONTINUED | OUTPATIENT
Start: 2023-07-18 | End: 2023-07-20 | Stop reason: HOSPADM

## 2023-07-18 RX ORDER — KETOROLAC TROMETHAMINE 30 MG/ML
30 INJECTION, SOLUTION INTRAMUSCULAR; INTRAVENOUS ONCE
Status: COMPLETED | OUTPATIENT
Start: 2023-07-18 | End: 2023-07-18

## 2023-07-18 RX ORDER — ONDANSETRON 4 MG/1
4 TABLET, FILM COATED ORAL EVERY 6 HOURS PRN
Status: DISCONTINUED | OUTPATIENT
Start: 2023-07-18 | End: 2023-07-18

## 2023-07-18 RX ORDER — CLINDAMYCIN PHOSPHATE 900 MG/50ML
900 INJECTION INTRAVENOUS ONCE
Status: COMPLETED | OUTPATIENT
Start: 2023-07-18 | End: 2023-07-18

## 2023-07-18 RX ORDER — DEXTROSE AND SODIUM CHLORIDE 5; .45 G/100ML; G/100ML
100 INJECTION, SOLUTION INTRAVENOUS CONTINUOUS
Status: DISCONTINUED | OUTPATIENT
Start: 2023-07-18 | End: 2023-07-18

## 2023-07-18 RX ORDER — SODIUM CHLORIDE 0.9 % (FLUSH) 0.9 %
1-10 SYRINGE (ML) INJECTION AS NEEDED
Status: DISCONTINUED | OUTPATIENT
Start: 2023-07-18 | End: 2023-07-18

## 2023-07-18 RX ORDER — FERROUS SULFATE 325(65) MG
325 TABLET ORAL
COMMUNITY

## 2023-07-18 RX ORDER — IBUPROFEN 600 MG/1
600 TABLET ORAL EVERY 6 HOURS PRN
Status: DISCONTINUED | OUTPATIENT
Start: 2023-07-18 | End: 2023-07-20 | Stop reason: HOSPADM

## 2023-07-18 RX ORDER — TRISODIUM CITRATE DIHYDRATE AND CITRIC ACID MONOHYDRATE 500; 334 MG/5ML; MG/5ML
30 SOLUTION ORAL ONCE
Status: COMPLETED | OUTPATIENT
Start: 2023-07-18 | End: 2023-07-18

## 2023-07-18 RX ORDER — ALBUTEROL SULFATE 2.5 MG/3ML
2.5 SOLUTION RESPIRATORY (INHALATION) 4 TIMES DAILY PRN
Status: DISCONTINUED | OUTPATIENT
Start: 2023-07-18 | End: 2023-07-20 | Stop reason: HOSPADM

## 2023-07-18 RX ORDER — DIPHENHYDRAMINE HCL 25 MG
25 CAPSULE ORAL NIGHTLY PRN
Status: DISCONTINUED | OUTPATIENT
Start: 2023-07-18 | End: 2023-07-20 | Stop reason: HOSPADM

## 2023-07-18 RX ORDER — OXYCODONE HYDROCHLORIDE AND ACETAMINOPHEN 5; 325 MG/1; MG/1
1 TABLET ORAL EVERY 4 HOURS PRN
Status: DISCONTINUED | OUTPATIENT
Start: 2023-07-18 | End: 2023-07-20 | Stop reason: HOSPADM

## 2023-07-18 RX ORDER — OXYTOCIN/0.9 % SODIUM CHLORIDE 30/500 ML
999 PLASTIC BAG, INJECTION (ML) INTRAVENOUS ONCE
Status: COMPLETED | OUTPATIENT
Start: 2023-07-18 | End: 2023-07-18

## 2023-07-18 RX ORDER — HYDROMORPHONE HYDROCHLORIDE 1 MG/ML
0.5 INJECTION, SOLUTION INTRAMUSCULAR; INTRAVENOUS; SUBCUTANEOUS
Status: DISCONTINUED | OUTPATIENT
Start: 2023-07-18 | End: 2023-07-18 | Stop reason: HOSPADM

## 2023-07-18 RX ORDER — BUDESONIDE 0.5 MG/2ML
0.5 INHALANT ORAL
Status: DISCONTINUED | OUTPATIENT
Start: 2023-07-18 | End: 2023-07-20 | Stop reason: HOSPADM

## 2023-07-18 RX ORDER — ONDANSETRON 2 MG/ML
4 INJECTION INTRAMUSCULAR; INTRAVENOUS ONCE AS NEEDED
Status: COMPLETED | OUTPATIENT
Start: 2023-07-18 | End: 2023-07-18

## 2023-07-18 RX ORDER — LIDOCAINE HYDROCHLORIDE 10 MG/ML
5 INJECTION, SOLUTION EPIDURAL; INFILTRATION; INTRACAUDAL; PERINEURAL AS NEEDED
Status: DISCONTINUED | OUTPATIENT
Start: 2023-07-18 | End: 2023-07-18

## 2023-07-18 RX ORDER — CEFAZOLIN SODIUM 2 G/100ML
2000 INJECTION, SOLUTION INTRAVENOUS EVERY 8 HOURS
Status: DISCONTINUED | OUTPATIENT
Start: 2023-07-18 | End: 2023-07-18

## 2023-07-18 RX ORDER — NALOXONE HCL 0.4 MG/ML
0.2 VIAL (ML) INJECTION
Status: DISCONTINUED | OUTPATIENT
Start: 2023-07-18 | End: 2023-07-20 | Stop reason: HOSPADM

## 2023-07-18 RX ORDER — OXYTOCIN/0.9 % SODIUM CHLORIDE 30/500 ML
250 PLASTIC BAG, INJECTION (ML) INTRAVENOUS CONTINUOUS
Status: DISPENSED | OUTPATIENT
Start: 2023-07-18 | End: 2023-07-18

## 2023-07-18 RX ORDER — FAMOTIDINE 20 MG/1
20 TABLET, FILM COATED ORAL DAILY
Status: DISCONTINUED | OUTPATIENT
Start: 2023-07-18 | End: 2023-07-20 | Stop reason: HOSPADM

## 2023-07-18 RX ORDER — NALOXONE HCL 0.4 MG/ML
0.4 VIAL (ML) INJECTION
Status: DISCONTINUED | OUTPATIENT
Start: 2023-07-18 | End: 2023-07-20 | Stop reason: HOSPADM

## 2023-07-18 RX ORDER — HYDROMORPHONE HYDROCHLORIDE 1 MG/ML
0.5 INJECTION, SOLUTION INTRAMUSCULAR; INTRAVENOUS; SUBCUTANEOUS
Status: DISCONTINUED | OUTPATIENT
Start: 2023-07-18 | End: 2023-07-20 | Stop reason: HOSPADM

## 2023-07-18 RX ORDER — SODIUM CHLORIDE, SODIUM LACTATE, POTASSIUM CHLORIDE, CALCIUM CHLORIDE 600; 310; 30; 20 MG/100ML; MG/100ML; MG/100ML; MG/100ML
125 INJECTION, SOLUTION INTRAVENOUS CONTINUOUS
Status: DISCONTINUED | OUTPATIENT
Start: 2023-07-18 | End: 2023-07-18

## 2023-07-18 RX ADMIN — OXYCODONE HYDROCHLORIDE AND ACETAMINOPHEN 1 TABLET: 5; 325 TABLET ORAL at 12:20

## 2023-07-18 RX ADMIN — ACETAMINOPHEN 1000 MG: 500 TABLET ORAL at 07:26

## 2023-07-18 RX ADMIN — SODIUM CHLORIDE, POTASSIUM CHLORIDE, SODIUM LACTATE AND CALCIUM CHLORIDE 125 ML/HR: 600; 310; 30; 20 INJECTION, SOLUTION INTRAVENOUS at 05:20

## 2023-07-18 RX ADMIN — CLINDAMYCIN PHOSPHATE 900 MG: 900 INJECTION, SOLUTION INTRAVENOUS at 07:29

## 2023-07-18 RX ADMIN — DOCUSATE SODIUM 100 MG: 100 CAPSULE, LIQUID FILLED ORAL at 20:57

## 2023-07-18 RX ADMIN — SODIUM CITRATE AND CITRIC ACID MONOHYDRATE 30 ML: 500; 334 SOLUTION ORAL at 07:25

## 2023-07-18 RX ADMIN — FAMOTIDINE 20 MG: 10 INJECTION INTRAVENOUS at 07:23

## 2023-07-18 RX ADMIN — ONDANSETRON 4 MG: 2 INJECTION INTRAMUSCULAR; INTRAVENOUS at 07:22

## 2023-07-18 RX ADMIN — SODIUM CHLORIDE, POTASSIUM CHLORIDE, SODIUM LACTATE AND CALCIUM CHLORIDE 125 ML/HR: 600; 310; 30; 20 INJECTION, SOLUTION INTRAVENOUS at 06:56

## 2023-07-18 RX ADMIN — Medication 125 ML/HR: at 09:40

## 2023-07-18 RX ADMIN — KETOROLAC TROMETHAMINE 15 MG: 15 INJECTION, SOLUTION INTRAMUSCULAR; INTRAVENOUS at 22:04

## 2023-07-18 RX ADMIN — FAMOTIDINE 20 MG: 20 TABLET, FILM COATED ORAL at 12:20

## 2023-07-18 RX ADMIN — IBUPROFEN 600 MG: 600 TABLET, FILM COATED ORAL at 16:02

## 2023-07-18 NOTE — L&D DELIVERY NOTE
SECTION FULL OPERATIVE REPORT  Pre-operative Diagnosis: Term intrauterine pregnancy at 39 weeks 0 days for repeat low-transverse  section  Post-operative Diagnosis: Same, delivered  Procedure: Repeat low-transverse  section  Section  Anesthesia: Spinal  Surgeon(s): ANAMARIA Garcia  Assistant(s):   Assistant: David Hong MD  was responsible for performing the following activities: Retraction, Suction, Irrigation, Suturing, Closing, Placing Dressing, and Delivery of Fetus and their skilled assistance was necessary for the success of this case.  Gestational age 39 weeks 0 days  Active Hospital Problems    Diagnosis  POA    ** delivery delivered [O82]  Unknown    S/P repeat low transverse  [Z98.891]  Not Applicable    GBS (group B Streptococcus carrier), +RV culture, currently pregnant [O99.820]  Not Applicable    Asthma [J45.909]  Yes     Seen Dr. Zayas      Increased BMI [R63.8]  Yes     Infant    Findings: male  infant     Weight: 3520 g (7 lb 12.2 oz)   Length: 19.5  in  Observations/Comments:  Panda OR 1      8  @ 1 minute /   9  @ 5 minutes          Complications: none  Specimens: None  EBL: See epic for quantitative blood loss      Description of Procedure:After reviewing risks,benefits, and possible complications, the patient was taken to the operating room where anesthesia was found to be adequate. She was prepped and draped in the usual sterile fashion in the dorsal supine position with a leftward tilt.   A Pfannenstiel skin incision was made with the scalpel and carried through the fat to the underlying layer of fascia. The fascia was then incised in the midline and the incision was extended laterally with Squires scissors. The superior aspect of the fascia was then grasped with Kocher clamps and the underlying rectus muscles were then dissected off bluntly and  with the Squires scissors. The inferior aspect of the fascia was then grasped with Kocher clamps and  dissected off similarly with Squires scissors. The rectus muscles were  and the peritoneum  entered. The peritoneal incision was then carried superiorly and inferiorly taking care to identify the bladder at the lower aspect.   The bladder blade was inserted. The vesicouterine peritoneum was then identified and entered sharply with Metzenbaum scissors and a bladder flap was created.. The bladder blade was reinserted and the uterine incision was made down to the chorionic laeve. This was extended with the bandage scissors. Membranes were then ruptured with an Allis clamp.  The bladder blade was removed and the infant was delivered atraumatically. The infant was moving all four extremities.The nose and mouth were suctioned and the cord was clamped and cut. The infant was taken to the warmer for the   staff, who were present via hospital policy. Cord blood was collected. The placenta was removed, and the uterus was cleared of all clots and debris. The uterine incision was repaired in two layers using 0 chromic suture. The uterus was examined and noted to be hemostatic.  The posterior cul-de-sac and gutters were cleared of all clots and debris. The uterus,tubes and ovaries appeared normal. The uterine incision was  then re-inspected to ensure hemostasis as were all subfascial tissues. Vesic-uterine peritoneum was closed with a 3-0 vicryl suture. All counts were correct and parietal peritoneum was closed with a 3-0 vicryl suture.The fascia was re-approximated in a running fashion using 0-vicryl suture. Three interrupted 0 vicryl sutures were placed laterally and in the midline for reinforcement. The subcutaneous tissue was irrigated,  re-approximated in a running fashion with 3-0 vicryl. The skin was closed with 4-0 vicryl. Bandages were applied. Berg was draining clear urine.    The patient tolerated the procedure well. Sponge, lap and needle counts were correct. The patient was taken to recovery in stable  condition.    Chavez Garcia MD    7/18/2023  08:39 EDT

## 2023-07-18 NOTE — LACTATION NOTE
Lactation Consult Note  Mom was c/o pain with latching and called for assistance. Pt latched baby deeply after verbal direction and is nursing well at present. Encouraged breast compressions and keeping baby awake for a good feeding and so baby will not slip from breast to nipple.    Evaluation Completed: 2023 18:03 EDT  Patient Name: Cesia Ambrose  :  1995  MRN:  [id number]     REFERRAL  INFORMATION:                          Date of Referral: 23   Person Making Referral: nurse  Maternal Reason for Referral: breastfeeding currently       DELIVERY HISTORY:        Skin to skin initiation date/time:      Skin to skin end date/time:           MATERNAL ASSESSMENT:  Breast Size Issue: none (23 1800)  Breast Shape: pendulous (23 1800)  Breast Density: soft (23 1800)  Areola: elastic (23 1800)  Nipples: everted (23 1800)                INFANT ASSESSMENT:  Information for the patient's :  Ty Ambrose [[id number]]   No past medical history on file.   Feeding Readiness Cues: quiet (23 1800)      Feeding Tolerance/Success: coordinated suck/swallow/breathing (23 1800)                              Breastfeeding: breastfeeding, right side only (23 1800)   Infant Positioning: clutch/football (23 1800)         Effective Latch During Feeding: yes (23 1800)   Suck/Swallow/Breathing Coordination: present (23 1800)   Signs of Milk Transfer: deep jaw excursions noted (23)       Latch: 2-->grasps breast, tongue down, lips flanged, rhythmic sucking (23 1800)   Audible Swallowin-->a few with stimulation (23 1800)   Type of Nipple: 2-->everted (after stimulation) (23 1800)   Comfort (Breast/Nipple): 2-->soft/nontender (23 1800)   Hold (Positioning): 2-->no assist from staff, mother able to position/hold infant (23 1800)   Latch Score: 9 (23)     Infant-Driven Feeding Scales - Readiness: Alert  once handled. Some rooting or takes pacifier. Adequate tone. (07/18/23 1800)   Infant-Driven Feeding Scales - Quality: Nipples with a strong coordinated SSB throughout feed. (07/18/23 1800)            MATERNAL INFANT FEEDING:     Maternal Emotional State: relaxed (07/18/23 1800)  Infant Positioning: clutch/football (07/18/23 1800)   Signs of Milk Transfer: deep jaw excursions noted (07/18/23 1800)  Pain with Feeding: no (07/18/23 1800)           Milk Ejection Reflex: present (07/18/23 1800)           Latch Assistance: minimal assistance (07/18/23 1800)                               EQUIPMENT TYPE:  Breast Pump Type: double electric, personal (07/18/23 1800)                              BREAST PUMPING:          LACTATION REFERRALS:

## 2023-07-18 NOTE — H&P
History and physical    Admission date 2023     Patient: Cesia Ambrose MRN: [id number]   YOB: 1995 Age: 27 y.o. Sex: female     Chief Complaint   Patient presents with    Scheduled      Rpt c/s at 39 wks, +FM; denies contractions, VB LOF       HPI:    Cesia Ambrose is a 27 y.o.,  AT 39w0d admitted for repeat low-transverse  section.  Risk benefits potential complications of surgery reviewed.  Denies vaginal bleeding, leakage of fluid, or contractions. Admits to good fetal movement.        delivery delivered    Increased BMI    Asthma    GBS (group B Streptococcus carrier), +RV culture, currently pregnant    S/P repeat low transverse     OB History    Para Term  AB Living   2 1 1 0 0 1   SAB IAB Ectopic Molar Multiple Live Births   0 0 0 0 0 1      # Outcome Date GA Lbr [place]/2nd Weight Sex Delivery Anes PTL Lv   2 Current            1 Term 21 39w1d  3825 g (8 lb 6.9 oz) M CS-LTranv Spinal N ERICK      Birth Comments: panda 1      Past Medical History:   Diagnosis Date    Anemia     Anxiety     Asthma     Environmental allergies     GERD (gastroesophageal reflux disease)     Migraine     OCD (obsessive compulsive disorder)      Past Surgical History:   Procedure Laterality Date     SECTION N/A 2021    Procedure:  SECTION PRIMARY;  Surgeon: Chavez Garcia MD;  Location: [facility] LABOR DELIVERY;  Service: Obstetrics/Gynecology;  Laterality: N/A;    CHOLECYSTECTOMY      CYST REMOVAL      LAPAROSCOPIC CHOLECYSTECTOMY  1030    NASAL SEPTUM SURGERY      WISDOM TOOTH EXTRACTION       No current facility-administered medications on file prior to encounter.     Current Outpatient Medications on File Prior to Encounter   Medication Sig Dispense Refill    cetirizine (zyrTEC) 10 MG tablet       famotidine (Pepcid) 20 MG tablet Take 1 tablet by mouth Daily. 30 tablet 1    ferrous sulfate 325 (65 FE) MG tablet Take 1 tablet by mouth Daily  "With Breakfast.      prenatal vitamin (prenatal, CLASSIC, vitamin) tablet Take 1 tablet by mouth Daily.      Pulmicort Flexhaler 180 MCG/ACT inhaler          ROS:      Except as outlined in history of physical illness, patient denies any changes in her GYN, , GI systems. All other systems reviewed are negative.      OBJECTIVE:     Vitals:   Vitals:    23 0500 23 0539 23 0620   Weight: 106 kg (233 lb 12.8 oz)     Height:  167.6 cm (66\") 167.6 cm (66\")         Appearance/Psychiatric: In no distress   Constitutional: The patient is well nourished   Cardiovascular: She does not have edema. Heart RRR  Respiratory: Respiratory effort is normal. CTAB   Abdomen: Soft, gravid.  Ext: nontender, no edema. +2/4 bilateral patellar reflexes   Cx; deferred       LOS: 0 days    Chavez Garcia MD   2023    Assessment and Plan:    delivery delivered [O82]  S/P repeat low transverse  [Z98.891]  39 weeks 0-day intrauterine pregnancy  Admitted for repeat low-transverse .  Benefits potential complications reviewed    Clindamycin IV antibiotic prophylaxis ordered    Both anesthesia and myself have seen consultant with patient this morning.       "

## 2023-07-18 NOTE — LACTATION NOTE
This note was copied from a baby's chart.  P2 term baby, 5hrs old. Mom called for latch assistance. Baby was sleepy, he latched several times but would not suckle. We tried several positions then laid him on Mom's chest and he fell asleep. He has short frenulum. Mom reports her first baby also had short frenulum and after the frenectomy, he went on to breast feed x 1yr. She has personal breast pump at home. Encouraged to try nursing him again in one hour and call for additional assistance.

## 2023-07-19 LAB
DEPRECATED RDW RBC AUTO: 39.9 FL (ref 37–54)
ERYTHROCYTE [DISTWIDTH] IN BLOOD BY AUTOMATED COUNT: 13.5 % (ref 12.3–15.4)
HCT VFR BLD AUTO: 32.6 % (ref 34–46.6)
HGB BLD-MCNC: 10.5 G/DL (ref 12–15.9)
MCH RBC QN AUTO: 26.3 PG (ref 26.6–33)
MCHC RBC AUTO-ENTMCNC: 32.2 G/DL (ref 31.5–35.7)
MCV RBC AUTO: 81.7 FL (ref 79–97)
PLATELET # BLD AUTO: 254 10*3/MM3 (ref 140–450)
PMV BLD AUTO: 10.3 FL (ref 6–12)
RBC # BLD AUTO: 3.99 10*6/MM3 (ref 3.77–5.28)
WBC NRBC COR # BLD: 10.51 10*3/MM3 (ref 3.4–10.8)

## 2023-07-19 PROCEDURE — 85027 COMPLETE CBC AUTOMATED: CPT | Performed by: OBSTETRICS & GYNECOLOGY

## 2023-07-19 PROCEDURE — 0503F POSTPARTUM CARE VISIT: CPT | Performed by: OBSTETRICS & GYNECOLOGY

## 2023-07-19 PROCEDURE — 25010000002 KETOROLAC TROMETHAMINE PER 15 MG: Performed by: OBSTETRICS & GYNECOLOGY

## 2023-07-19 PROCEDURE — 63710000001 ONDANSETRON PER 8 MG: Performed by: OBSTETRICS & GYNECOLOGY

## 2023-07-19 RX ADMIN — DOCUSATE SODIUM 100 MG: 100 CAPSULE, LIQUID FILLED ORAL at 20:17

## 2023-07-19 RX ADMIN — KETOROLAC TROMETHAMINE 15 MG: 15 INJECTION, SOLUTION INTRAMUSCULAR; INTRAVENOUS at 15:24

## 2023-07-19 RX ADMIN — MUPIROCIN 1 APPLICATION: 20 OINTMENT TOPICAL at 17:23

## 2023-07-19 RX ADMIN — KETOROLAC TROMETHAMINE 15 MG: 15 INJECTION, SOLUTION INTRAMUSCULAR; INTRAVENOUS at 03:47

## 2023-07-19 RX ADMIN — ONDANSETRON HYDROCHLORIDE 4 MG: 4 TABLET, FILM COATED ORAL at 20:17

## 2023-07-19 RX ADMIN — FAMOTIDINE 20 MG: 20 TABLET, FILM COATED ORAL at 09:21

## 2023-07-19 RX ADMIN — OXYCODONE HYDROCHLORIDE AND ACETAMINOPHEN 1 TABLET: 5; 325 TABLET ORAL at 03:47

## 2023-07-19 RX ADMIN — SIMETHICONE 80 MG: 80 TABLET, CHEWABLE ORAL at 20:17

## 2023-07-19 RX ADMIN — KETOROLAC TROMETHAMINE 15 MG: 15 INJECTION, SOLUTION INTRAMUSCULAR; INTRAVENOUS at 09:20

## 2023-07-19 NOTE — LACTATION NOTE
Pt wanting assistance with latching baby. Assisted pt with latching baby to left breast. Educated on importance of deep latching and ways to achieve it. Baby is latching well with strong tugs and swallows. Pt reports deeper latch feels better.  Baby is having some periods of fast breathing and subcostal retractions between sucking periods. RN notified and she reports baby has been monitored for this. Baby is tolerated BF well at this time.     Lactation Consult Note    Evaluation Completed: 2023 15:48 EDT  Patient Name: Cesia Ambrose  :  1995  MRN:  [id number]     REFERRAL  INFORMATION:                          Date of Referral: 23   Person Making Referral: nurse  Maternal Reason for Referral: breastfeeding currently       DELIVERY HISTORY:        Skin to skin initiation date/time:      Skin to skin end date/time:           MATERNAL ASSESSMENT:                               INFANT ASSESSMENT:  Information for the patient's :  Ty Ambrose [[id number]]   No past medical history on file.                                                                                                   MATERNAL INFANT FEEDING:                                                                       EQUIPMENT TYPE:                                 BREAST PUMPING:          LACTATION REFERRALS:

## 2023-07-19 NOTE — LACTATION NOTE
Patient reports BF is painful. She reports baby has a tongue tie. RN reports pediatrician referred baby to Dr. Silvestre. Baby sleeping now. Encouraged pt to call LC as needed.    Lactation Consult Note    Evaluation Completed: 2023 15:13 EDT  Patient Name: Cesia Ambrose  :  1995  MRN:  [id number]     REFERRAL  INFORMATION:                          Date of Referral: 23   Person Making Referral: nurse  Maternal Reason for Referral: breastfeeding currently       DELIVERY HISTORY:        Skin to skin initiation date/time:      Skin to skin end date/time:           MATERNAL ASSESSMENT:                               INFANT ASSESSMENT:  Information for the patient's :  Ty Ambrose [[id number]]   No past medical history on file.                                                                                                   MATERNAL INFANT FEEDING:                                                                       EQUIPMENT TYPE:                                 BREAST PUMPING:          LACTATION REFERRALS:

## 2023-07-19 NOTE — PAYOR COMM NOTE
"Cesia Banegas (27 y.o. Female)       [facility]  [address] [place] [address]  Facility NPI: [id number]  Yves Pop  Fax: [phone]  Phone: [phone] (Leah: [phone])  Subject: ADMISSION NOTIFICATION AUTH CLINICALS  Reference #:   [id number]  Please don't hesitate to contact me with any questions or concerns.          Date of Birth   1995    Social Security Number       Address   [address] [place], [place] [address]DR[facility] [address] [facility] [place] [address]    Home Phone   [phone]    MRN   [id number]       [facility]    Marital Status                               Admission Date   23    Admission Type   Emergency    Admitting Provider   Chavez Garcia MD    Attending Provider   Chavez Garcia MD    Department, Room/Bed   [address], [address]/1       Discharge Date       Discharge Disposition       Discharge Destination                                 Attending Provider: Chavez Garcia MD    Allergies: Morphine, Penicillins    Isolation: None   Infection: None   Code Status: Prior    Ht: 167.6 cm (66\")   Wt: 106 kg (233 lb 12.8 oz)    Admission Cmt: None   Principal Problem:  delivery delivered [O82]                   Active Insurance as of 2023       Primary Coverage       Payor Plan Insurance Group Employer/Plan Group    ANTHEM BLUE CROSS ANTHEM BLUE CROSS BLUE SHIELD PPO [id number]       Payor Plan Address Payor Plan Phone Number Payor Plan Fax Number Effective Dates    [address] [phone]  2022 - None Entered    [place] [address]         Subscriber Name Subscriber Birth Date Member ID       KENJI BANEGAS MARYANA 3/11/1995 [id number]                     Emergency Contacts        (Rel.) Home Phone Work Phone Mobile Phone    HalieKenji (Spouse) -- -- [phone]    Grecia Catalan (Sister) -- -- [phone]              [place]: NPI [id number]  Tax ID [id number]  Insurance Information                  ANTHEM BLUE CROSS/PK " [facility] Phone: [phone]    Subscriber: Kenji Ambrose Subscriber#: [id number]    Group#: [id number] Precert#: --             History & Physical        Chavez Garcia MD at 23 0712          History and physical    Admission date 2023     Patient: Cesia Ambrose MRN: [id number]   YOB: 1995 Age: 27 y.o. Sex: female     Chief Complaint   Patient presents with    Scheduled      Rpt c/s at 39 wks, +FM; denies contractions, VB LOF       HPI:    Cesia Ambrose is a 27 y.o.,  AT 39w0d admitted for repeat low-transverse  section.  Risk benefits potential complications of surgery reviewed.  Denies vaginal bleeding, leakage of fluid, or contractions. Admits to good fetal movement.        delivery delivered    Increased BMI    Asthma    GBS (group B Streptococcus carrier), +RV culture, currently pregnant    S/P repeat low transverse     OB History    Para Term  AB Living   2 1 1 0 0 1   SAB IAB Ectopic Molar Multiple Live Births   0 0 0 0 0 1      # Outcome Date GA Lbr Pedro Pablo/2nd Weight Sex Delivery Anes PTL Lv   2 Current            1 Term 21 39w1d  3825 g (8 lb 6.9 oz) M CS-LTranv Spinal N ERICK      Birth Comments: panda 1      Past Medical History:   Diagnosis Date    Anemia     Anxiety     Asthma     Environmental allergies     GERD (gastroesophageal reflux disease)     Migraine     OCD (obsessive compulsive disorder)      Past Surgical History:   Procedure Laterality Date     SECTION N/A 2021    Procedure:  SECTION PRIMARY;  Surgeon: Chavez Garcia MD;  [facility]: [facility] LABOR DELIVERY;  Service: Obstetrics/Gynecology;  Laterality: N/A;    CHOLECYSTECTOMY      CYST REMOVAL      LAPAROSCOPIC CHOLECYSTECTOMY  1030    NASAL SEPTUM SURGERY      WISDOM TOOTH EXTRACTION       No current facility-administered medications on file prior to encounter.     Current Outpatient Medications on File Prior to  "Encounter   Medication Sig Dispense Refill    cetirizine (zyrTEC) 10 MG tablet       famotidine (Pepcid) 20 MG tablet Take 1 tablet by mouth Daily. 30 tablet 1    ferrous sulfate 325 (65 FE) MG tablet Take 1 tablet by mouth Daily With Breakfast.      prenatal vitamin (prenatal, CLASSIC, vitamin) tablet Take 1 tablet by mouth Daily.      Pulmicort Flexhaler 180 MCG/ACT inhaler          ROS:      Except as outlined in history of physical illness, patient denies any changes in her GYN, , GI systems. All other systems reviewed are negative.      OBJECTIVE:     Vitals:   Vitals:    23 0500 23 0539 23 0620   Weight: 106 kg (233 lb 12.8 oz)     Height:  167.6 cm (66\") 167.6 cm (66\")         Appearance/Psychiatric: In no distress   Constitutional: The patient is well nourished   Cardiovascular: She does not have edema. Heart RRR  Respiratory: Respiratory effort is normal. CTAB   Abdomen: Soft, gravid.  Ext: nontender, no edema. +2/4 bilateral patellar reflexes   Cx; deferred       LOS: 0 days    Chavez Garcia MD   2023    Assessment and Plan:    delivery delivered [O82]  S/P repeat low transverse  [Z98.891]  39 weeks 0-day intrauterine pregnancy  Admitted for repeat low-transverse .  Benefits potential complications reviewed    Clindamycin IV antibiotic prophylaxis ordered    Both anesthesia and myself have seen consultant with patient this morning.         Electronically signed by Chavez Garcia MD at 23 0718       Facility-Administered Medications as of 2023   Medication Dose Route Frequency Provider Last Rate Last Admin    [COMPLETED] acetaminophen (TYLENOL) tablet 1,000 mg  1,000 mg Oral Once Chavez Garcia MD   1,000 mg at 23 0726    albuterol (PROVENTIL) nebulizer solution 0.083% 2.5 mg/3mL  2.5 mg Nebulization 4x Daily PRN Chavez Garcia MD        [COMPLETED] aluminum-magnesium hydroxide-simethicone (MAALOX MAX) 400-400-40 MG/5ML suspension 15 " mL  15 mL Oral Once Evy Aguilar MD   15 mL at 04/18/23 1144    bisacodyl (DULCOLAX) suppository 10 mg  10 mg Rectal Daily PRN Chavez Garcia MD        budesonide (PULMICORT) nebulizer solution 0.5 mg  0.5 mg Nebulization BID - RT Chavez Garcia MD        [COMPLETED] clindamycin (CLEOCIN) 900 mg in dextrose 5% 50 mL IVPB (premix)  900 mg Intravenous Once Chavez Garcia MD 50 mL/hr at 07/18/23 0729 900 mg at 07/18/23 0729    diphenhydrAMINE (BENADRYL) capsule 25 mg  25 mg Oral Q4H PRN Miles Barragan CRNA        Or    diphenhydrAMINE (BENADRYL) injection 25 mg  25 mg Intravenous Q4H PRN Miles Barragan CRNA        Or    diphenhydrAMINE (BENADRYL) injection 25 mg  25 mg Intramuscular Q4H PRN Miles Barragan CRNA        diphenhydrAMINE (BENADRYL) capsule 25 mg  25 mg Oral Nightly PRN Chavez Garcia MD        Or    diphenhydrAMINE (BENADRYL) injection 25 mg  25 mg Intravenous Nightly PRN Chavez Garcia MD        docusate sodium (COLACE) capsule 100 mg  100 mg Oral BID PRN Chavez Garcia MD   100 mg at 07/18/23 2057    droperidol (INAPSINE) injection 0.625 mg  0.625 mg Intravenous Q20 Min PRN Miles Barragan CRNA        Or    droperidol (INAPSINE) injection 0.625 mg  0.625 mg Intramuscular Q20 Min PRN Miles Barragan CRNA        [COMPLETED] famotidine (PEPCID) injection 20 mg  20 mg Intravenous Once PRN Booker Valentine MD   20 mg at 07/18/23 0723    famotidine (PEPCID) tablet 20 mg  20 mg Oral Daily Chavez Garcia MD   20 mg at 07/19/23 0921    HYDROmorphone (DILAUDID) injection 0.5 mg  0.5 mg Intravenous Q2H PRN Chavez Garcia MD        And    naloxone (NARCAN) injection 0.1 mg  0.1 mg Intravenous Q5 Min PRN Chavez Garcia MD        ibuprofen (ADVIL,MOTRIN) tablet 600 mg  600 mg Oral Q6H PRN Chavez Garcia MD   600 mg at 07/18/23 1602    ketorolac (TORADOL) injection 15 mg  15 mg Intravenous Q6H Chavez Garcia MD   15 mg at 07/19/23 0920    [COMPLETED] ketorolac (TORADOL) injection 30 mg  30 mg Intravenous  Once Chavez Garcia MD   30 mg at 23 0833    [COMPLETED] lactated ringers bolus 1,000 mL  1,000 mL Intravenous Once Evy Aguilar MD 1,000 mL/hr at 23 1132 1,000 mL at 23 1132    [COMPLETED] Lidocaine Viscous HCl (XYLOCAINE) 2 % solution 15 mL  15 mL Mouth/Throat Once Evy Aguilar MD   15 mL at 23 1144    magnesium hydroxide (MILK OF MAGNESIA) 400 MG/5ML suspension 15 mL  15 mL Oral Daily PRN Chavez Garcia MD        morphine injection 1 mg  1 mg Intravenous Q4H PRN Chavez Garcia MD        And    naloxone (NARCAN) injection 0.4 mg  0.4 mg Intravenous Q5 Min PRN Chavez Garcia MD        naloxone (NARCAN) injection 0.2 mg  0.2 mg Intravenous Q15 Min PRN Miles Barragan CRNA        [COMPLETED] ondansetron (ZOFRAN) injection 4 mg  4 mg Intravenous Once PRN Booker Valentine MD   4 mg at 23 0722    ondansetron (ZOFRAN) injection 4 mg  4 mg Intravenous Once PRN Miles Barragan CRNA        ondansetron (ZOFRAN) tablet 4 mg  4 mg Oral Q6H PRN Chavez Garcia MD        Or    ondansetron (ZOFRAN) injection 4 mg  4 mg Intravenous Q6H PRN Chavez Garcia MD        oxyCODONE-acetaminophen (PERCOCET) 5-325 MG per tablet 1 tablet  1 tablet Oral Q4H PRN Chavez Garcia MD   1 tablet at 23 0347    [COMPLETED] oxytocin (PITOCIN) 30 units in 0.9% sodium chloride 500 mL (premix)  999 mL/hr Intravenous Once Chavez Garcia  mL/hr at 23 0819 250 mL/hr at 23 0819    Followed by    [] oxytocin (PITOCIN) 30 units in 0.9% sodium chloride 500 mL (premix)  250 mL/hr Intravenous Continuous Chavez Garcia  mL/hr at 23 0940 125 mL/hr at 23 0940    simethicone (MYLICON) chewable tablet 80 mg  80 mg Oral 4x Daily PRN Chavez Garcia MD        [COMPLETED] Sod Citrate-Citric Acid (BICITRA) solution 30 mL  30 mL Oral Once Booker Valentine MD   30 mL at 23 0725     Lab Results (last 72 hours)       Procedure Component Value Units Date/Time    CBC (No  Diff) [[id number]]  (Abnormal) Collected: 07/19/23 0832    Specimen: Blood Updated: 07/19/23 0927     WBC 10.51 10*3/mm3      RBC 3.99 10*6/mm3      Hemoglobin 10.5 g/dL      Hematocrit 32.6 %      MCV 81.7 fL      MCH 26.3 pg      MCHC 32.2 g/dL      RDW 13.5 %      RDW-SD 39.9 fl      MPV 10.3 fL      Platelets 254 10*3/mm3     CBC & Differential [[id number]]  (Abnormal) Collected: 07/18/23 0520    Specimen: Blood Updated: 07/18/23 0600    Narrative:      The following orders were created for panel order CBC & Differential.  Procedure                               Abnormality         Status                     ---------                               -----------         ------                     CBC Auto Differential[[id number]]        Abnormal            Final result                 Please view results for these tests on the individual orders.    CBC Auto Differential [[id number]]  (Abnormal) Collected: 07/18/23 0520    Specimen: Blood Updated: 07/18/23 0600     WBC 13.80 10*3/mm3      RBC 4.37 10*6/mm3      Hemoglobin 11.6 g/dL      Hematocrit 35.7 %      MCV 81.7 fL      MCH 26.5 pg      MCHC 32.5 g/dL      RDW 13.9 %      RDW-SD 40.8 fl      MPV 10.4 fL      Platelets 279 10*3/mm3      Neutrophil % 76.4 %      Lymphocyte % 13.4 %      Monocyte % 6.4 %      Eosinophil % 2.6 %      Basophil % 0.4 %      Immature Grans % 0.8 %      Neutrophils, Absolute 10.54 10*3/mm3      Lymphocytes, Absolute 1.85 10*3/mm3      Monocytes, Absolute 0.88 10*3/mm3      Eosinophils, Absolute 0.36 10*3/mm3      Basophils, Absolute 0.06 10*3/mm3      Immature Grans, Absolute 0.11 10*3/mm3      nRBC 0.0 /100 WBC           Imaging Results (Last 72 Hours)       ** No results found for the last 72 hours. **          ECG/EMG Results (last 72 hours)       ** No results found for the last 72 hours. **          Orders (last 72 hrs)        Start     Ordered    07/19/23 0600  CBC (No Diff)  Morning Draw         07/18/23 1152    07/19/23 0000   Remove Vaginal Packing  Once        Comments: In am, if vaginal pack was placed at the time of surgery    07/18/23 1152    07/18/23 2200  ketorolac (TORADOL) injection 15 mg  Every 6 Hours         07/18/23 1620    07/18/23 1530  ceFAZolin in dextrose (ANCEF) IVPB solution 2,000 mg  Every 8 Hours,   Status:  Discontinued         07/18/23 1152    07/18/23 1245  famotidine (PEPCID) tablet 20 mg  Daily         07/18/23 1152    07/18/23 1245  budesonide (PULMICORT) nebulizer solution 0.5 mg  2 Times Daily - RT         07/18/23 1152    07/18/23 1245  dextrose 5 % and sodium chloride 0.45 % infusion  Continuous,   Status:  Discontinued         07/18/23 1152    07/18/23 1207  Diet: Regular/House Diet; Texture: Regular Texture (IDDSI 7); Fluid Consistency: Thin (IDDSI 0)  Diet Effective Now         07/18/23 1206    07/18/23 1200  Incentive Spirometry  Every 2 Hours While Awake       07/18/23 1152    07/18/23 1153  Transfer Patient  Once         07/18/23 1152    07/18/23 1153  Intake and Output  Every Shift       07/18/23 1152    07/18/23 1153  Saline lock IV if tolerating PO  Once         07/18/23 1152    07/18/23 1153  Vital Signs per hospital policy  Per Hospital Policy         07/18/23 1152    07/18/23 1153  Turn cough deep breathe  Once         07/18/23 1152    07/18/23 1153  Advance Diet As Tolerated -  Until Discontinued         07/18/23 1152    07/18/23 1152  albuterol (PROVENTIL) nebulizer solution 0.083% 2.5 mg/3mL  4 Times Daily PRN         07/18/23 1152    07/18/23 1152  ibuprofen (ADVIL,MOTRIN) tablet 600 mg  Every 6 Hours PRN         07/18/23 1152    07/18/23 1152  oxyCODONE-acetaminophen (PERCOCET) 5-325 MG per tablet 1 tablet  Every 4 Hours PRN         07/18/23 1152    07/18/23 1152  morphine injection 1 mg  Every 4 Hours PRN        See Hyperspace for full Linked Orders Report.    07/18/23 1152    07/18/23 1152  naloxone (NARCAN) injection 0.4 mg  Every 5 Minutes PRN        See Hyperspace for full Linked  Orders Report.    07/18/23 1152    07/18/23 1152  HYDROmorphone (DILAUDID) injection 0.5 mg  Every 2 Hours PRN        See Hyperspace for full Linked Orders Report.    07/18/23 1152    07/18/23 1152  naloxone (NARCAN) injection 0.1 mg  Every 5 Minutes PRN        See Hyperspace for full Linked Orders Report.    07/18/23 1152    07/18/23 1152  diphenhydrAMINE (BENADRYL) capsule 25 mg  Nightly PRN        See Hyperspace for full Linked Orders Report.    07/18/23 1152    07/18/23 1152  diphenhydrAMINE (BENADRYL) injection 25 mg  Nightly PRN        See Hyperspace for full Linked Orders Report.    07/18/23 1152    07/18/23 1152  docusate sodium (COLACE) capsule 100 mg  2 Times Daily PRN         07/18/23 1152    07/18/23 1152  bisacodyl (DULCOLAX) suppository 10 mg  Daily PRN         07/18/23 1152    07/18/23 1152  simethicone (MYLICON) chewable tablet 80 mg  4 Times Daily PRN         07/18/23 1152    07/18/23 1152  ondansetron (ZOFRAN) tablet 4 mg  Every 6 Hours PRN        See Hyperspace for full Linked Orders Report.    07/18/23 1152    07/18/23 1152  ondansetron (ZOFRAN) injection 4 mg  Every 6 Hours PRN        See Hyperspace for full Linked Orders Report.    07/18/23 1152    07/18/23 1152  magnesium hydroxide (MILK OF MAGNESIA) 400 MG/5ML suspension 15 mL  Daily PRN         07/18/23 1152    07/18/23 0947  naloxone (NARCAN) injection 0.2 mg  Every 15 Minutes PRN         07/18/23 0947    07/18/23 0908  Transfer to postpartum when discharge criteria met.  Until Discontinued         07/18/23 0907    07/18/23 0908  Vital Signs  Per Hospital Policy,   Status:  Canceled         07/18/23 0907    07/18/23 0908  Oxygen Therapy- Nasal Cannula; 2 LPM  Continuous,   Status:  Canceled         07/18/23 0907    07/18/23 0908  Continuous Pulse Oximetry  Continuous         07/18/23 0907    07/18/23 0908  Respirations  Continuous        Comments: If respiratory rate is less than 10/min, notify the Anesthesiologist    07/18/23 0907     07/18/23 0908  If respiratory is less than 8/min, see Narcan order below. Notify Anesthesiologist STAT.  Until Discontinued         07/18/23 0907    07/18/23 0908  Blood Pressure and Pulse Every 4 Hours  Continuous,   Status:  Canceled         07/18/23 0907    07/18/23 0908  Activity & Removal of Berg Catheter, Per Obstetrician  Continuous,   Status:  Canceled         07/18/23 0907    07/18/23 0908  Notify Anesthesiologist for Any Questions / Problems  Continuous,   Status:  Canceled         07/18/23 0907    07/18/23 0908  Notify Anesthesia for Temp Over 101.4F  Continuous,   Status:  Canceled         07/18/23 0907    07/18/23 0908  Ambu Bag at Bedside  Continuous         07/18/23 0907    07/18/23 0908  Vital Signs Per hospital policy  Per Hospital Policy,   Status:  Canceled         07/18/23 0907    07/18/23 0908  Strict Bed Rest  Until Discontinued,   Status:  Canceled         07/18/23 0907    07/18/23 0908  Fundal & Lochia Check  Per Order Details        Comments: Every 15 Minutes x4, Then Every 30 Minutes x2, Then Every Shift    07/18/23 0907    07/18/23 0908  Notify Physician (specified)  Until Discontinued         07/18/23 0907    07/18/23 0908  Diet: Regular/House Diet; Texture: Regular Texture (IDDSI 7); Fluid Consistency: Thin (IDDSI 0)  Diet Effective Now,   Status:  Canceled         07/18/23 0907    07/18/23 0908  Advance Diet As Tolerated -  Until Discontinued         07/18/23 0907    07/18/23 0907  HYDROmorphone (DILAUDID) injection 0.5 mg  Every 10 Minutes PRN,   Status:  Discontinued         07/18/23 0907    07/18/23 0907  Fundal & Lochia Check  Every Shift       07/18/23 0907    07/18/23 0907  ondansetron (ZOFRAN) injection 4 mg  Once As Needed         07/18/23 0907    07/18/23 0907  droperidol (INAPSINE) injection 0.625 mg  Every 20 Minutes PRN        See Hyperspace for full Linked Orders Report.    07/18/23 0907    07/18/23 0907  droperidol (INAPSINE) injection 0.625 mg  Every 20 Minutes PRN         See Hyperspace for full Linked Orders Report.    07/18/23 0907    07/18/23 0907  diphenhydrAMINE (BENADRYL) capsule 25 mg  Every 4 Hours PRN        See Hyperspace for full Linked Orders Report.    07/18/23 0907    07/18/23 0907  diphenhydrAMINE (BENADRYL) injection 25 mg  Every 4 Hours PRN        See Hyperspace for full Linked Orders Report.    07/18/23 0907    07/18/23 0907  diphenhydrAMINE (BENADRYL) injection 25 mg  Every 4 Hours PRN        See Hyperspace for full Linked Orders Report.    07/18/23 0907    07/18/23 0907  IV Site Care  Continuous         07/18/23 0906    07/18/23 0907  Notify physician (specify)  Until Discontinued         07/18/23 0906    07/18/23 0907  Place Sequential Compression Device  Once         07/18/23 0906    07/18/23 0907  Maintain Sequential Compression Device  Continuous         07/18/23 0906    07/18/23 0900  sodium chloride 0.9 % flush 10 mL  Every 12 Hours Scheduled,   Status:  Discontinued         07/18/23 0447    07/18/23 0815  oxytocin (PITOCIN) 30 units in 0.9% sodium chloride 500 mL (premix)  Continuous        See Hyperspace for full Linked Orders Report.    07/18/23 0710    07/18/23 0800  oxytocin (PITOCIN) 30 units in 0.9% sodium chloride 500 mL (premix)  Once        See Hyperspace for full Linked Orders Report.    07/18/23 0710    07/18/23 0800  ketorolac (TORADOL) injection 30 mg  Once         07/18/23 0710    07/18/23 0730  clindamycin (CLEOCIN) 900 mg in dextrose 5% 50 mL IVPB (premix)  Once         07/18/23 0632    07/18/23 0715  Sod Citrate-Citric Acid (BICITRA) solution 30 mL  Once         07/18/23 0625    07/18/23 0710  morphine injection 2 mg  Every 2 Hours PRN,   Status:  Discontinued         07/18/23 0710    07/18/23 0710  ondansetron (ZOFRAN) tablet 4 mg  Every 6 Hours PRN,   Status:  Discontinued        See Hyperspace for full Linked Orders Report.    07/18/23 0710    07/18/23 0710  ondansetron (ZOFRAN) injection 4 mg  Every 6 Hours PRN,   Status:   Discontinued        See Hyperspace for full Linked Orders Report.    23 0710    23 0710  methylergonovine (METHERGINE) injection 200 mcg  As Needed,   Status:  Discontinued         23 0710    23 0710  carboprost (HEMABATE) injection 250 mcg  As Needed,   Status:  Discontinued         23 0710    23 06  Nurse may remove epidural catheter after delivery.  Until Discontinued,   Status:  Canceled         23 0625    23  Notify physician for the following conditions:  Until Discontinued,   Status:  Canceled         23 0625    23 0625  ondansetron (ZOFRAN) injection 4 mg  Once As Needed         23  famotidine (PEPCID) injection 20 mg  Once As Needed         23 0625    23 0545  lactated ringers bolus 1,000 mL  Once,   Status:  Discontinued         23 0447    23 0545  lactated ringers infusion  Continuous,   Status:  Discontinued         237    23 0545  acetaminophen (TYLENOL) tablet 1,000 mg  Once         23 0448    238  Admit To Obstetrics Inpatient  Once         237    23  Obtain informed consent  Once         23  Vital Signs Per hospital policy  Per Hospital Policy,   Status:  Canceled         237    23 0448  Up Ad Mariluz  Until Discontinued,   Status:  Canceled         238  Initiate Group Beta Strep (GBS) Prophylaxis Protocol, If Criteria Met  Continuous,   Status:  Canceled        Comments: NO TREATMENT RECOMMENDED IF: 1)  Maternal GBS status known negative 2)  Scheduled  birth with intact membranes, not in labor.  3 ) Maternal GBS unknown, no risk factors.   TREAT WITH ANTIBIOTICS IF:  1)  Maternal GBS status is known postive.  2)  Maternal GBS status unknown with these risk factors:  a)  Previous infant affected by GBS infection.  b)  GBS urinary tract infection (UTI) or  bacteruria during pregnancy  c)  Unexplained maternal fever in labor (greater than or equal to 100.4F or 38.0C)  d)  Prolonged rupture of the membranes greater than or equal to 18 hours.  e)  Gestational age less than 37 weeks.    07/18/23 0447 07/18/23 0448  Abdominal Prep with Clippers  Once,   Status:  Canceled         07/18/23 0447 07/18/23 0448  Chlorhexadine Skin Prep Unless Otherwise Indicated  Once,   Status:  Canceled         07/18/23 0447 07/18/23 0448  SCD (sequential compression devices)  Once,   Status:  Canceled         07/18/23 0447 07/18/23 0448  Document Gatorade Consumption Prior to Admission (Yes or No)  Once         07/18/23 0447 07/18/23 0448  Continuous Fetal Monitoring With NST on Admission and Prior to Initiation of Oxytocin.  Per Order Details,   Status:  Canceled        Comments: Continuous Fetal Monitoring With NST on Admission & Prior to Initiation of Oxytocin.    07/18/23 0447 07/18/23 0448  External Uterine Contraction Monitoring  Per Hospital Policy,   Status:  Canceled         07/18/23 0447 07/18/23 0448  Notify Physician (specified)  Until Discontinued,   Status:  Canceled         07/18/23 0447 07/18/23 0448  Notify physician for hyperstimulus (per hospital algorithm)  Until Discontinued,   Status:  Canceled         07/18/23 0447 07/18/23 0448  Notify physician if membranes ruptured, bleeding greater than 1 pad an hour, fetal heart tone abnormality, and severe pain  Until Discontinued,   Status:  Canceled         07/18/23 0447 07/18/23 0448  NPO Diet NPO Type: Ice Chips  Diet Effective Now,   Status:  Canceled         07/18/23 0447 07/18/23 0448  Inpatient Consult to Anesthesiology  Once,   Status:  Canceled        Specialty:  Anesthesiology  Provider:  (Not yet assigned)    07/18/23 0447 07/18/23 0448  Type & Screen  Once         07/18/23 0447 07/18/23 0448  CBC & Differential  STAT         07/18/23 0447 07/18/23 0448  POCT protein, urine,  qualitative, dipstick  Once,   Status:  Canceled         23  Insert Peripheral IV  Once         23  Saline Lock & Maintain IV Access  Continuous,   Status:  Canceled         23  CBC Auto Differential  PROCEDURE ONCE         23  lidocaine PF 1% (XYLOCAINE) injection 5 mL  As Needed,   Status:  Discontinued         23  sodium chloride 0.9 % flush 1-10 mL  As Needed,   Status:  Discontinued         23    Unscheduled  Blood Gas, Arterial -  As Needed       23 0907    Unscheduled  Up in Chair  As Needed       23 1152    --  ferrous sulfate 325 (65 FE) MG tablet  Daily With Breakfast         23 0536                     Operative/Procedure Notes (last 72 hours)        Chavez Garcia MD at 23 0839           SECTION FULL OPERATIVE REPORT  Pre-operative Diagnosis: Term intrauterine pregnancy at 39 weeks 0 days for repeat low-transverse  section  Post-operative Diagnosis: Same, delivered  Procedure: Repeat low-transverse  section  Section  Anesthesia: Spinal  Surgeon(s): ANAMARIA Garcia  Assistant(s):   Assistant: David Hong MD  was responsible for performing the following activities: Retraction, Suction, Irrigation, Suturing, Closing, Placing Dressing, and Delivery of Fetus and their skilled assistance was necessary for the success of this case.  Gestational age 39 weeks 0 days  Active Hospital Problems    Diagnosis  POA    ** delivery delivered [O82]  Unknown    S/P repeat low transverse  [Z98.891]  Not Applicable    GBS (group B Streptococcus carrier), +RV culture, currently pregnant [O99.820]  Not Applicable    Asthma [J45.909]  Yes     Seen Dr. Zayas      Increased BMI [R63.8]  Yes     Infant    Findings: male  infant     Weight: 3520 g (7 lb 12.2 oz)   Length: 19.5  in  Observations/Comments:  Alfonso OR  1      8  @ 1 minute /   9  @ 5 minutes          Complications: none  Specimens: None  EBL: See epic for quantitative blood loss      Description of Procedure:After reviewing risks,benefits, and possible complications, the patient was taken to the operating room where anesthesia was found to be adequate. She was prepped and draped in the usual sterile fashion in the dorsal supine position with a leftward tilt.   A Pfannenstiel skin incision was made with the scalpel and carried through the fat to the underlying layer of fascia. The fascia was then incised in the midline and the incision was extended laterally with Squires scissors. The superior aspect of the fascia was then grasped with Kocher clamps and the underlying rectus muscles were then dissected off bluntly and  with the Squires scissors. The inferior aspect of the fascia was then grasped with Kocher clamps and dissected off similarly with Squires scissors. The rectus muscles were  and the peritoneum  entered. The peritoneal incision was then carried superiorly and inferiorly taking care to identify the bladder at the lower aspect.   The bladder blade was inserted. The vesicouterine peritoneum was then identified and entered sharply with Metzenbaum scissors and a bladder flap was created.. The bladder blade was reinserted and the uterine incision was made down to the chorionic laeve. This was extended with the bandage scissors. Membranes were then ruptured with an Allis clamp.  The bladder blade was removed and the infant was delivered atraumatically. The infant was moving all four extremities.The nose and mouth were suctioned and the cord was clamped and cut. The infant was taken to the warmer for the   staff, who were present via hospital policy. Cord blood was collected. The placenta was removed, and the uterus was cleared of all clots and debris. The uterine incision was repaired in two layers using 0 chromic suture. The uterus was examined and  noted to be hemostatic.  The posterior cul-de-sac and gutters were cleared of all clots and debris. The uterus,tubes and ovaries appeared normal. The uterine incision was  then re-inspected to ensure hemostasis as were all subfascial tissues. Vesic-uterine peritoneum was closed with a 3-0 vicryl suture. All counts were correct and parietal peritoneum was closed with a 3-0 vicryl suture.The fascia was re-approximated in a running fashion using 0-vicryl suture. Three interrupted 0 vicryl sutures were placed laterally and in the midline for reinforcement. The subcutaneous tissue was irrigated,  re-approximated in a running fashion with 3-0 vicryl. The skin was closed with 4-0 vicryl. Bandages were applied. Berg was draining clear urine.    The patient tolerated the procedure well. Sponge, lap and needle counts were correct. The patient was taken to recovery in stable condition.    Chavez Garcia MD    2023  08:39 EDT       Electronically signed by Chavez Garcia MD at 23 0840          Physician Progress Notes (last 72 hours)        Reina Freeman MD at 23 [facility]          [facility]   Obstetrics and Gynecology     2023    Patient: Cesia Ambrose   MR#:[id number]        Progress note         HD#1  Post-Op Day 1 S/P    Delivered a male infant.    Subjective     Cesia Ambrose is a 27 y.o. female  post operative from CS at 39w0d weeks  Patient reports:  Pain is well controlled. Voiding and ambulating without difficulty.  Tolerating po. Lochia normal.     Breast/bottle The patient is currently breastfeeding.    Patient Active Problem List   Diagnosis    Migraine without aura and with status migrainosus, not intractable    Family history of breast cancer    BRCA negative    Gastroesophageal reflux disease without esophagitis    Increased BMI    Asthma     delivery delivered    Gallstones    Calculus of gallbladder with chronic cholecystitis without  obstruction    Nausea/vomiting in pregnancy    GBS (group B Streptococcus carrier), +RV culture, currently pregnant    S/P repeat low transverse         Objective      Vital Signs Range for the last 24 hours    Temperature: Temp:  [97 °F (36.1 °C)-98.4 °F (36.9 °C)] 97.8 °F (36.6 °C)  BP:  BP: (104-123)/(62-85) 121/78  Pulse:  Heart Rate:  [74-99] 86  Respirations: Resp:  [16-18] 16  Weight: 106 kg (233 lb 12.8 oz)   BMI:  Body mass index is 37.74 kg/m².    I/O last 3 completed shifts:  In: 1100 [I.V.:1100]  Out: 2931 [Urine:2600; Blood:331]  I/O this shift:  In: 800 [P.O.:800]  Out: 700 [Urine:700]     Physical Exam  Vitals and nursing note reviewed.   Constitutional:       Appearance: Normal appearance.   Pulmonary:      Effort: Pulmonary effort is normal.   Abdominal:      General: There is no distension.      Palpations: Abdomen is soft.      Tenderness: There is no abdominal tenderness.      Comments: Dressing C/D/I    Neurological:      Mental Status: She is alert and oriented to person, place, and time.   Psychiatric:         Mood and Affect: Mood normal.         Behavior: Behavior normal.       LABS:    Results from last 7 days   Lab Units 23  0832 23  0520   WBC 10*3/mm3 10.51 13.80*   HEMOGLOBIN g/dL 10.5* 11.6*   HEMATOCRIT % 32.6* 35.7   PLATELETS 10*3/mm3 254 279             Assessment & Plan     1.  POD #1 S/P C/S:  Hemodynamically stable.  Doing well.      delivery delivered    Increased BMI    Asthma    GBS (group B Streptococcus carrier), +RV culture, currently pregnant    S/P repeat low transverse       Plan:    Continue routine postoperative care   Declines circumcision           Reina Freeman MD  2023  10:27 EDT        Electronically signed by Reina Freeman MD at 23 1027       Consult Notes (last 72 hours)  Notes from 23 1336 through 23 1336   No notes of this type exist for this encounter.

## 2023-07-19 NOTE — PROGRESS NOTES
[facility]   Obstetrics and Gynecology     2023    Patient: Cesia Ambrose   MR#:[id number]        Progress note         HD#1  Post-Op Day 1 S/P    Delivered a male infant.    Subjective     Cesia Ambrose is a 27 y.o. female  post operative from CS at 39w0d weeks  Patient reports:  Pain is well controlled. Voiding and ambulating without difficulty.  Tolerating po. Lochia normal.     Breast/bottle The patient is currently breastfeeding.    Patient Active Problem List   Diagnosis    Migraine without aura and with status migrainosus, not intractable    Family history of breast cancer    BRCA negative    Gastroesophageal reflux disease without esophagitis    Increased BMI    Asthma     delivery delivered    Gallstones    Calculus of gallbladder with chronic cholecystitis without obstruction    Nausea/vomiting in pregnancy    GBS (group B Streptococcus carrier), +RV culture, currently pregnant    S/P repeat low transverse         Objective      Vital Signs Range for the last 24 hours    Temperature: Temp:  [97 °F (36.1 °C)-98.4 °F (36.9 °C)] 97.8 °F (36.6 °C)  BP:  BP: (104-123)/(62-85) 121/78  Pulse:  Heart Rate:  [74-99] 86  Respirations: Resp:  [16-18] 16  Weight: 106 kg (233 lb 12.8 oz)   BMI:  Body mass index is 37.74 kg/m².    I/O last 3 completed shifts:  In: 1100 [I.V.:1100]  Out: 2931 [Urine:2600; Blood:331]  I/O this shift:  In: 800 [P.O.:800]  Out: 700 [Urine:700]     Physical Exam  Vitals and nursing note reviewed.   Constitutional:       Appearance: Normal appearance.   Pulmonary:      Effort: Pulmonary effort is normal.   Abdominal:      General: There is no distension.      Palpations: Abdomen is soft.      Tenderness: There is no abdominal tenderness.      Comments: Dressing C/D/I    Neurological:      Mental Status: She is alert and oriented to person, place, and time.   Psychiatric:         Mood and Affect: Mood normal.         Behavior: Behavior  normal.       LABS:    Results from last 7 days   Lab Units 23  0832 23  0520   WBC 10*3/mm3 10.51 13.80*   HEMOGLOBIN g/dL 10.5* 11.6*   HEMATOCRIT % 32.6* 35.7   PLATELETS 10*3/mm3 254 279             Assessment & Plan     1.  POD #1 S/P C/S:  Hemodynamically stable.  Doing well.      delivery delivered    Increased BMI    Asthma    GBS (group B Streptococcus carrier), +RV culture, currently pregnant    S/P repeat low transverse       Plan:    Continue routine postoperative care   Declines circumcision           Reina Freeman MD  2023  10:27 EDT

## 2023-07-19 NOTE — PLAN OF CARE
Goal Outcome Evaluation:  Plan of Care Reviewed With: patient, spouse        Progress: improving  Outcome Evaluation: VSS. Ambulating independently. Voiding spontaneously. Pain control with scheduled Toradol and PRN Roxicodone. Breastfeeding. Hoping for discharge home on Thursday.

## 2023-07-20 VITALS
HEIGHT: 66 IN | HEART RATE: 80 BPM | OXYGEN SATURATION: 96 % | TEMPERATURE: 98.1 F | SYSTOLIC BLOOD PRESSURE: 133 MMHG | WEIGHT: 233.8 LBS | DIASTOLIC BLOOD PRESSURE: 88 MMHG | RESPIRATION RATE: 18 BRPM | BODY MASS INDEX: 37.57 KG/M2

## 2023-07-20 PROCEDURE — 0503F POSTPARTUM CARE VISIT: CPT | Performed by: OBSTETRICS & GYNECOLOGY

## 2023-07-20 RX ORDER — DOCUSATE SODIUM 100 MG/1
100 CAPSULE, LIQUID FILLED ORAL 2 TIMES DAILY PRN
Qty: 60 CAPSULE | Refills: 0 | Status: SHIPPED | OUTPATIENT
Start: 2023-07-20

## 2023-07-20 RX ORDER — IBUPROFEN 600 MG/1
600 TABLET ORAL EVERY 6 HOURS PRN
Qty: 50 TABLET | Refills: 1 | Status: SHIPPED | OUTPATIENT
Start: 2023-07-20

## 2023-07-20 RX ORDER — OXYCODONE HYDROCHLORIDE AND ACETAMINOPHEN 5; 325 MG/1; MG/1
1 TABLET ORAL EVERY 4 HOURS PRN
Qty: 15 TABLET | Refills: 0 | Status: SHIPPED | OUTPATIENT
Start: 2023-07-20 | End: 2023-07-28

## 2023-07-20 RX ADMIN — IBUPROFEN 600 MG: 600 TABLET, FILM COATED ORAL at 08:37

## 2023-07-20 RX ADMIN — FAMOTIDINE 20 MG: 20 TABLET, FILM COATED ORAL at 08:37

## 2023-07-20 RX ADMIN — OXYCODONE HYDROCHLORIDE AND ACETAMINOPHEN 1 TABLET: 5; 325 TABLET ORAL at 00:37

## 2023-07-20 RX ADMIN — OXYCODONE HYDROCHLORIDE AND ACETAMINOPHEN 1 TABLET: 5; 325 TABLET ORAL at 08:37

## 2023-07-20 RX ADMIN — DOCUSATE SODIUM 100 MG: 100 CAPSULE, LIQUID FILLED ORAL at 08:37

## 2023-07-20 RX ADMIN — IBUPROFEN 600 MG: 600 TABLET, FILM COATED ORAL at 00:37

## 2023-07-20 NOTE — DISCHARGE SUMMARY
section Discharge Summary    Date of Admission: 2023  Date of Discharge:  2023      Patient: Cesia Ambrose      MR#:[id number]    Surgeon/OB: Chavez Garcia MD    Discharge Diagnosis:  section at 39w0d, uncomplicated recovery    Procedures:  , Low Transverse     2023    8:03 AM      Anesthesia:       Presenting Problem/History of Present Illness   delivery delivered [O82]  S/P repeat low transverse  [Z98.891]     Patient Active Problem List   Diagnosis    Migraine without aura and with status migrainosus, not intractable    Family history of breast cancer    BRCA negative    Gastroesophageal reflux disease without esophagitis    Increased BMI    Asthma     delivery delivered    Gallstones    Calculus of gallbladder with chronic cholecystitis without obstruction    Nausea/vomiting in pregnancy    GBS (group B Streptococcus carrier), +RV culture, currently pregnant    S/P repeat low transverse        Hospital Course  Patient is a 27 y.o. female  at 39w0d status post  section with uneventful postoperative recovery.  Patient was advanced to regular diet on postoperative day#1.  On discharge, ambulating, tolerating a regular diet without any difficulties and her incision is dry, clean and intact.     Infant:   male  fetus 3520 g (7 lb 12.2 oz)  with Apgar scores of 8 , 9  at five minutes.    Condition on Discharge:  Stable    Vital Signs  Temp:  [98.1 °F (36.7 °C)-98.7 °F (37.1 °C)] 98.1 °F (36.7 °C)  Heart Rate:  [] 80  Resp:  [16-18] 18  BP: (124-133)/(81-88) 133/88    Lab Results   Component Value Date    WBC 10.51 2023    HGB 10.5 (L) 2023    HCT 32.6 (L) 2023    MCV 81.7 2023     2023       Discharge Disposition  Home or Self Care    Discharge Medications     Discharge Medications        New Medications        Instructions Start Date   docusate sodium 100 MG capsule   100 mg, Oral, 2  Times Daily PRN      ibuprofen 600 MG tablet  Commonly known as: ADVIL,MOTRIN   600 mg, Oral, Every 6 Hours PRN      oxyCODONE-acetaminophen 5-325 MG per tablet  Commonly known as: PERCOCET   1 tablet, Oral, Every 4 Hours PRN             Continue These Medications        Instructions Start Date   albuterol sulfate  (90 Base) MCG/ACT inhaler  Commonly known as: PROVENTIL HFA;VENTOLIN HFA;PROAIR HFA       cetirizine 10 MG tablet  Commonly known as: zyrTEC   No dose, route, or frequency recorded.      famotidine 20 MG tablet  Commonly known as: Pepcid   20 mg, Oral, Daily      ferrous sulfate 325 (65 FE) MG tablet   325 mg, Oral, Daily With Breakfast      levalbuterol 45 MCG/ACT inhaler  Commonly known as: XOPENEX HFA   INHALE TWO PUFFS BY MOUTH FOUR TIMES A DAY AS NEEDED      prenatal (CLASSIC) vitamin  tablet  Generic drug: prenatal vitamin   1 tablet, Oral, Daily      Pulmicort Flexhaler 180 MCG/ACT inhaler  Generic drug: budesonide   No dose, route, or frequency recorded.               Discharge Diet: Regular    Follow-up Appointments  Future Appointments   Date Time Provider Department Center   8/1/2023  2:00 PM Chavez Garcia MD [facility]     Additional Instructions for the Follow-ups that You Need to Schedule       Call MD With Problems / Concerns   As directed      If you have newly increased incisional pain, drainage or bleeding from the incision, skin redness around the incision  If you have heavy bleeding, soaking through a pad per hour or passing large clots  If you have persistent headache, visual changes, upper abdominal pain  If you have persistent nausea, vomiting  If your pain is severe and uncontrollable    Order Comments: If you have newly increased incisional pain, drainage or bleeding from the incision, skin redness around the incision If you have heavy bleeding, soaking through a pad per hour or passing large clots If you have persistent headache, visual changes, upper abdominal pain  If you have persistent nausea, vomiting If your pain is severe and uncontrollable          Discharge Follow-up with Specified Provider: follow up with your primary OB provider in two weeks and in six weeks; 2 Weeks   As directed      To: follow up with your primary OB provider in two weeks and in six weeks    Follow Up: 2 Weeks    Follow Up Details: Call the office or follow up sooner if you are having any problems                 Prenatal labs/vax: a+/I/-/- NR    Cielo Benito MD  7/20/2023  11:41 EDT

## 2023-07-20 NOTE — LACTATION NOTE
Patient reports her milk is in. She reports latch feels better since frenotomy. Baby is cluster feeding. Pt reports baby is gulping and swallowing. Educated on baby's expected output and weight gain. Pt has hospitals info    Lactation Consult Note    Evaluation Completed: 2023 11:38 EDT  Patient Name: Cesia Ambrose  :  1995  MRN:  [id number]     REFERRAL  INFORMATION:                          Date of Referral: 23   Person Making Referral: nurse  Maternal Reason for Referral: breastfeeding currently       DELIVERY HISTORY:        Skin to skin initiation date/time:      Skin to skin end date/time:           MATERNAL ASSESSMENT:                               INFANT ASSESSMENT:  Information for the patient's :  Ty Ambrose [[id number]]   No past medical history on file.                                                                                                   MATERNAL INFANT FEEDING:                                                                       EQUIPMENT TYPE:                                 BREAST PUMPING:          LACTATION REFERRALS:

## 2023-07-20 NOTE — PROGRESS NOTES
2023    Name:Cesia Ambrose    MR#:[id number]     Progress Note:  Post-Op day 2 S/P    HD:2    Subjective   27 y.o. yo Female  s/p CS at 39w0d doing well. Pain well controlled. Tolerating regular diet and having flatus. Lochia normal.     Patient Active Problem List   Diagnosis    Migraine without aura and with status migrainosus, not intractable    Family history of breast cancer    BRCA negative    Gastroesophageal reflux disease without esophagitis    Increased BMI    Asthma     delivery delivered    Gallstones    Calculus of gallbladder with chronic cholecystitis without obstruction    Nausea/vomiting in pregnancy    GBS (group B Streptococcus carrier), +RV culture, currently pregnant    S/P repeat low transverse         Objective    Vitals  Temp:  Temp:  [98.1 °F (36.7 °C)-98.7 °F (37.1 °C)] 98.1 °F (36.7 °C)  Temp src: Oral  BP:  BP: (124-133)/(81-88) 133/88  Pulse:  Heart Rate:  [] 80  RR:   Resp:  [16-18] 18  Weight: 106 kg (233 lb 12.8 oz)  BMI:  Body mass index is 37.74 kg/m².      General Awake, alert, no distress  Abdomen Soft, non-distended, fundus firm, 2 cm below umbilicus, appropriately tender  Incision  Intact, no erythema or exudate  Extremities Calves NT bilaterally         I/O last 3 completed shifts:  In: 1817 [P.O.:1817]  Out: 5400 [Urine:5400]    LABS:   Lab Results   Component Value Date    WBC 10.51 2023    HGB 10.5 (L) 2023    HCT 32.6 (L) 2023    MCV 81.7 2023     2023       Infant: male       Assessment   1.  POD 2    Plan: Doing well.  Desires to go home today       delivery delivered    Increased BMI    Asthma    GBS (group B Streptococcus carrier), +RV culture, currently pregnant    S/P repeat low transverse       Cielo Benito MD  2023 11:39 EDT

## 2023-08-01 ENCOUNTER — POSTPARTUM VISIT (OUTPATIENT)
Dept: OBSTETRICS AND GYNECOLOGY | Age: 28
End: 2023-08-01
Payer: COMMERCIAL

## 2023-08-01 VITALS
HEIGHT: 66 IN | SYSTOLIC BLOOD PRESSURE: 126 MMHG | DIASTOLIC BLOOD PRESSURE: 70 MMHG | WEIGHT: 207 LBS | BODY MASS INDEX: 33.27 KG/M2

## 2023-08-01 DIAGNOSIS — Z98.890 POST-OPERATIVE STATE: Primary | ICD-10-CM

## 2023-08-13 RX ORDER — LEVALBUTEROL TARTRATE 45 UG/1
AEROSOL, METERED ORAL
Qty: 15 G | Refills: 1 | Status: SHIPPED | OUTPATIENT
Start: 2023-08-13

## 2023-08-29 ENCOUNTER — POSTPARTUM VISIT (OUTPATIENT)
Dept: OBSTETRICS AND GYNECOLOGY | Age: 28
End: 2023-08-29
Payer: COMMERCIAL

## 2023-08-29 VITALS
DIASTOLIC BLOOD PRESSURE: 72 MMHG | WEIGHT: 207.4 LBS | SYSTOLIC BLOOD PRESSURE: 124 MMHG | HEIGHT: 66 IN | BODY MASS INDEX: 33.33 KG/M2

## 2023-08-29 PROCEDURE — 0503F POSTPARTUM CARE VISIT: CPT | Performed by: OBSTETRICS & GYNECOLOGY

## 2023-08-29 NOTE — PROGRESS NOTES
"Subjective     Chief Complaint   Patient presents with    Postpartum Care     PP 6 wks C/S 2023 male, Breast feeding, No problems today       Baby's name: Peng Ambrose is a 27 y.o. female who presents for a postpartum visit. She is 6 weeks postpartum following a low cervical transverse  section. I have fully reviewed the prenatal and intrapartum course. Postpartum course has been uneventful. Baby is feeding by breast. Bleeding has been normal in amount and decreasing.. Bowel function is normal. Bladder function is normal. Patient not sexually active at this time. Contraception method is discussed. Postpartum depression screening: negative.    The following portions of the patient's history were reviewed and updated as appropriate: allergies, current medications, past family history, past medical history, past social history, past surgical history and problem list.    Review of Systems  Pertinent items are noted in HPI.    Objective   /72   Ht 167.6 cm (66\")   Wt 94.1 kg (207 lb 6.4 oz)   Breastfeeding Yes   BMI 33.48 kg/mý    General:  alert    Breasts:  normal       Heart:  regular rate and rhytm   Abdomen: Normal findings                           Diagnoses and all orders for this visit:    1. Postpartum follow-up (Primary)       Normal postpartum exam. Pap smear not done at today's visit.     Contraception: discussed will use condoms while  gets vasectomy   Slow return to normal activities reviewed. Continue prenatal vitamins.   Follow up in 12 months  Or sooner  as needed.    Problem List Items Addressed This Visit    None  Visit Diagnoses       Postpartum follow-up    -  Primary                 EMR Dragon/ Transcription disclaimer:  Much of the encounter note is an electronic transcription/translation of spoken language to printed text. The electronic translation of spoken language may permit erroneous, or at times, nonessential words or phrases to be inadvertently " transcribes; Although i have reviewed the note for such errors, some may still exist.

## 2023-11-12 RX ORDER — LEVALBUTEROL TARTRATE 45 UG/1
AEROSOL, METERED ORAL
Qty: 15 G | Refills: 1 | Status: SHIPPED | OUTPATIENT
Start: 2023-11-12

## 2023-11-18 ENCOUNTER — PATIENT MESSAGE (OUTPATIENT)
Dept: FAMILY MEDICINE CLINIC | Facility: CLINIC | Age: 28
End: 2023-11-18
Payer: COMMERCIAL

## 2023-11-18 RX ORDER — VALACYCLOVIR HYDROCHLORIDE 1 G/1
1000 TABLET, FILM COATED ORAL 2 TIMES DAILY
Qty: 16 TABLET | Refills: 1 | Status: SHIPPED | OUTPATIENT
Start: 2023-11-18

## 2023-11-18 NOTE — TELEPHONE ENCOUNTER
From: Cesia Ambrose  To: Trina Lechuga  Sent: 11/18/2023 8:34 AM EST  Subject: Medication     Hi Dr SOLORZANO! I have been searching for th medication uou prescribed me for cold sores. I have a high fever for two days so of course I have one now but I can't request a refill on because my pharmacy doesn't see it. Could you please send a new prescription over for me? I am calling Monday to set a physically with you

## 2023-12-26 RX ORDER — LEVALBUTEROL TARTRATE 45 UG/1
AEROSOL, METERED ORAL
Qty: 15 G | Refills: 1 | Status: SHIPPED | OUTPATIENT
Start: 2023-12-26

## 2023-12-26 NOTE — TELEPHONE ENCOUNTER
Rx Refill Note  Requested Prescriptions     Pending Prescriptions Disp Refills    levalbuterol (XOPENEX HFA) 45 MCG/ACT inhaler [Pharmacy Med Name: LEVALBUTEROL TAR HFA 45MCG INH] 15 g 1     Sig: INHALE TWO PUFFS BY MOUTH FOUR TIMES A DAY AS NEEDED      Last office visit with prescribing clinician: Visit date not found   Last telemedicine visit with prescribing clinician: Visit date not found   Next office visit with prescribing clinician: 5/15/2024       Anthony Zapata  12/26/23, 11:40 EST

## 2024-01-05 ENCOUNTER — TELEPHONE (OUTPATIENT)
Dept: FAMILY MEDICINE CLINIC | Facility: CLINIC | Age: 29
End: 2024-01-05
Payer: COMMERCIAL

## 2024-01-05 RX ORDER — ONDANSETRON 4 MG/1
4 TABLET, ORALLY DISINTEGRATING ORAL EVERY 8 HOURS PRN
Qty: 15 TABLET | Refills: 2 | Status: SHIPPED | OUTPATIENT
Start: 2024-01-05

## 2024-01-05 NOTE — TELEPHONE ENCOUNTER
Caller: Cesia Ambrose    Relationship: Self    Best call back number: [phone]    What is the best time to reach you: ANYTIME , AS SOON AS POSSIBLE    Who are you requesting to speak with (clinical staff, provider,  specific staff member): CLINICAL     What was the call regarding: PATIENT STATES SHE HAS BEEN EXPERIENCING ABDOMINAL PAIN, NAUSEA AND DIARRHEA FOR ABOUT AN HOUR NOW.     PATIENT STATES SHE IS WANTING TO KNOW WHAT SHE SHOULD DO.    PLEASE CALL AND ADVISE.

## 2024-01-05 NOTE — TELEPHONE ENCOUNTER
Hydrate and a nausea medication would be useful.  Let me know if she wants Phenergan or Zofran called in.  If the pain is serious, an ER visit may be needed.

## 2024-01-12 ENCOUNTER — TELEPHONE (OUTPATIENT)
Dept: GASTROENTEROLOGY | Facility: CLINIC | Age: 29
End: 2024-01-12

## 2024-01-16 ENCOUNTER — TELEPHONE (OUTPATIENT)
Dept: OBSTETRICS AND GYNECOLOGY | Age: 29
End: 2024-01-16
Payer: COMMERCIAL

## 2024-01-16 ENCOUNTER — OFFICE VISIT (OUTPATIENT)
Dept: GASTROENTEROLOGY | Facility: CLINIC | Age: 29
End: 2024-01-16
Payer: COMMERCIAL

## 2024-01-16 VITALS
TEMPERATURE: 97.3 F | DIASTOLIC BLOOD PRESSURE: 78 MMHG | BODY MASS INDEX: 33.79 KG/M2 | OXYGEN SATURATION: 98 % | WEIGHT: 202.8 LBS | HEIGHT: 65 IN | SYSTOLIC BLOOD PRESSURE: 110 MMHG | HEART RATE: 80 BPM

## 2024-01-16 DIAGNOSIS — R10.31 RIGHT LOWER QUADRANT ABDOMINAL PAIN: Primary | ICD-10-CM

## 2024-01-16 DIAGNOSIS — R10.13 DYSPEPSIA: ICD-10-CM

## 2024-01-16 DIAGNOSIS — R19.7 DIARRHEA, UNSPECIFIED TYPE: ICD-10-CM

## 2024-01-16 LAB
ALBUMIN SERPL-MCNC: 4.6 G/DL (ref 3.5–5.2)
ALBUMIN/GLOB SERPL: 1.6 G/DL
ALP SERPL-CCNC: 78 U/L (ref 39–117)
ALT SERPL-CCNC: 28 U/L (ref 1–33)
AST SERPL-CCNC: 24 U/L (ref 1–32)
BASOPHILS # BLD AUTO: 0.1 10*3/MM3 (ref 0–0.2)
BASOPHILS NFR BLD AUTO: 1.2 % (ref 0–1.5)
BILIRUB SERPL-MCNC: 0.2 MG/DL (ref 0–1.2)
BUN SERPL-MCNC: 11 MG/DL (ref 6–20)
BUN/CREAT SERPL: 14.3 (ref 7–25)
CALCIUM SERPL-MCNC: 9.9 MG/DL (ref 8.6–10.5)
CHLORIDE SERPL-SCNC: 104 MMOL/L (ref 98–107)
CO2 SERPL-SCNC: 25.2 MMOL/L (ref 22–29)
CREAT SERPL-MCNC: 0.77 MG/DL (ref 0.57–1)
CRP SERPL-MCNC: 1.09 MG/DL (ref 0–0.5)
EGFRCR SERPLBLD CKD-EPI 2021: 107.2 ML/MIN/1.73
EOSINOPHIL # BLD AUTO: 0.44 10*3/MM3 (ref 0–0.4)
EOSINOPHIL NFR BLD AUTO: 5.5 % (ref 0.3–6.2)
ERYTHROCYTE [DISTWIDTH] IN BLOOD BY AUTOMATED COUNT: 13.5 % (ref 12.3–15.4)
ERYTHROCYTE [SEDIMENTATION RATE] IN BLOOD BY WESTERGREN METHOD: 15 MM/HR (ref 0–20)
GLOBULIN SER CALC-MCNC: 2.8 GM/DL
GLUCOSE SERPL-MCNC: 84 MG/DL (ref 65–99)
HCT VFR BLD AUTO: 43.5 % (ref 34–46.6)
HGB BLD-MCNC: 14.1 G/DL (ref 12–15.9)
IMM GRANULOCYTES # BLD AUTO: 0.03 10*3/MM3 (ref 0–0.05)
IMM GRANULOCYTES NFR BLD AUTO: 0.4 % (ref 0–0.5)
LYMPHOCYTES # BLD AUTO: 2.1 10*3/MM3 (ref 0.7–3.1)
LYMPHOCYTES NFR BLD AUTO: 26.2 % (ref 19.6–45.3)
MCH RBC QN AUTO: 26.8 PG (ref 26.6–33)
MCHC RBC AUTO-ENTMCNC: 32.4 G/DL (ref 31.5–35.7)
MCV RBC AUTO: 82.7 FL (ref 79–97)
MONOCYTES # BLD AUTO: 0.54 10*3/MM3 (ref 0.1–0.9)
MONOCYTES NFR BLD AUTO: 6.7 % (ref 5–12)
NEUTROPHILS # BLD AUTO: 4.8 10*3/MM3 (ref 1.7–7)
NEUTROPHILS NFR BLD AUTO: 60 % (ref 42.7–76)
NRBC BLD AUTO-RTO: 0 /100 WBC (ref 0–0.2)
PLATELET # BLD AUTO: 382 10*3/MM3 (ref 140–450)
POTASSIUM SERPL-SCNC: 4.7 MMOL/L (ref 3.5–5.2)
PROT SERPL-MCNC: 7.4 G/DL (ref 6–8.5)
RBC # BLD AUTO: 5.26 10*6/MM3 (ref 3.77–5.28)
SODIUM SERPL-SCNC: 140 MMOL/L (ref 136–145)
WBC # BLD AUTO: 8.01 10*3/MM3 (ref 3.4–10.8)

## 2024-01-16 PROCEDURE — 99204 OFFICE O/P NEW MOD 45 MIN: CPT | Performed by: PHYSICIAN ASSISTANT

## 2024-01-16 RX ORDER — HYDROCORTISONE ACETATE 25 MG/1
25 SUPPOSITORY RECTAL 2 TIMES DAILY PRN
Qty: 30 SUPPOSITORY | Refills: 0 | Status: SHIPPED | OUTPATIENT
Start: 2024-01-16

## 2024-01-16 NOTE — PROGRESS NOTES
"Chief Complaint  Black or Bloody Stool and Diarrhea    Subjective          History of Present Illness    Cesia Ambrose is a  28 y.o. female presents for evaluation of diarrhea with stool urgency and blood. She is a patient of Dr. Arredondo last seen in  for GERD and hepatic steatosis.  She is new to me.    She reports diarrhea and urgency that started after her first child but worsened after her second child this summer. 2-4 Bms/day, always urgent even if normal stools. Reports intermittent bleeding on toilet paper but has blood in toilet bowl last week. Intermittent abdominal pain, had more extreme episode of left sided abdominal pain several weeks ago. Relieved on it's own. Cholecystectomy in . No weight loss without trying. She is breast feeding currently. Taking probiotic but has not tried anything else. Takes a protein shake that is high in fiber. Denies rectal pain.     She also admits to heartburn, mostly at night, 3-4 times/week, taking tums. She will vomit with bad reflux especially if she eats prior to lying down. Otherwise denies nausea, dysphagia.     She gave birth via  on 2023 which was uncomplicated.  This was her second child.    Brother has GI problems and grandmother with IBS--unknown otherwise no known family history of GI cancers and IBD    Objective   Vital Signs:   /78   Pulse 80   Temp 97.3 °F (36.3 °C)   Ht 165.1 cm (65\")   Wt 92 kg (202 lb 12.8 oz)   SpO2 98%   BMI 33.75 kg/m²       Physical Exam  Vitals reviewed.   Constitutional:       General: She is awake. She is not in acute distress.     Appearance: Normal appearance. She is well-developed and well-groomed.   HENT:      Head: Normocephalic.   Pulmonary:      Effort: Pulmonary effort is normal. No respiratory distress.   Abdominal:      General: Abdomen is flat. Bowel sounds are normal. There is no distension.      Palpations: Abdomen is soft. There is no hepatomegaly or mass.      Tenderness: There is " abdominal tenderness in the right lower quadrant. There is no guarding or rebound.   Skin:     Coloration: Skin is not pale.   Neurological:      Mental Status: She is alert and oriented to person, place, and time.      Gait: Gait is intact.   Psychiatric:         Mood and Affect: Mood and affect normal.         Speech: Speech normal.         Behavior: Behavior is cooperative.         Judgment: Judgment normal.          Result Review :             Assessment and Plan    Diagnoses and all orders for this visit:    1. Right lower quadrant abdominal pain (Primary)  -     CBC & Differential  -     Comprehensive Metabolic Panel  -     C-reactive Protein  -     CT Abdomen Pelvis With Contrast  -     Sedimentation Rate  -     Celiac Comprehensive Panel  -     Thyroid Panel With TSH    2. Diarrhea, unspecified type  -     CBC & Differential  -     Comprehensive Metabolic Panel  -     C-reactive Protein  -     CT Abdomen Pelvis With Contrast  -     Sedimentation Rate  -     Celiac Comprehensive Panel  -     Thyroid Panel With TSH    3. Dyspepsia  -     CBC & Differential  -     Comprehensive Metabolic Panel  -     C-reactive Protein  -     CT Abdomen Pelvis With Contrast  -     Sedimentation Rate  -     Celiac Comprehensive Panel  -     Thyroid Panel With TSH    Other orders  -     hydrocortisone (ANUSOL-HC) 25 MG suppository; Insert 1 suppository into the rectum 2 (Two) Times a Day As Needed for Hemorrhoids (rectal bleeding).  Dispense: 30 suppository; Refill: 0    Will start with CT scan and blood work as noted above.    Increase fiber to 25g/day and GERD diet. Consider starting daily pepcid 20mg 1-2 times daily.     Start suppositories for possible internal hemorrhoids as source of rectal bleeding.    May need to consider endoscopies    Follow Up   Return in about 6 weeks (around 2/27/2024).    Dragon dictation used throughout this note.     Amira Granados PA-C

## 2024-01-16 NOTE — TELEPHONE ENCOUNTER
Pt.not'd.She forgot to ask about taking pepcid and hydrocortisone supp.while breastfeeding.Are they safe?

## 2024-01-16 NOTE — TELEPHONE ENCOUNTER
Patient currently breastfeeding , she had  last 2023 . Patient is scheduled for CT scan with contrast next week and wants to know if she needs to interrupt the breastfeeding .  I advised the patient to discard their breast milk for 24-48 hrs after the exposure to the contrast   Please advise is there anything else she has to do ? Thank you .

## 2024-01-17 LAB
ENDOMYSIUM IGA SER QL: NEGATIVE
FT4I SERPL CALC-MCNC: 2.6 (ref 1.2–4.9)
GLIADIN PEPTIDE IGA SER-ACNC: 8 UNITS (ref 0–19)
GLIADIN PEPTIDE IGG SER-ACNC: 2 UNITS (ref 0–19)
IGA SERPL-MCNC: 210 MG/DL (ref 87–352)
T3RU NFR SERPL: 29 % (ref 24–39)
T4 SERPL-MCNC: 8.8 UG/DL (ref 4.5–12)
TSH SERPL DL<=0.005 MIU/L-ACNC: 0.98 UIU/ML (ref 0.45–4.5)
TTG IGA SER-ACNC: <2 U/ML (ref 0–3)
TTG IGG SER-ACNC: 3 U/ML (ref 0–5)

## 2024-01-23 ENCOUNTER — HOSPITAL ENCOUNTER (OUTPATIENT)
Dept: CT IMAGING | Facility: HOSPITAL | Age: 29
Discharge: HOME | End: 2024-01-23
Admitting: PHYSICIAN ASSISTANT
Payer: COMMERCIAL

## 2024-01-23 ENCOUNTER — OFFICE VISIT (OUTPATIENT)
Dept: FAMILY MEDICINE CLINIC | Facility: CLINIC | Age: 29
End: 2024-01-23
Payer: COMMERCIAL

## 2024-01-23 VITALS
SYSTOLIC BLOOD PRESSURE: 116 MMHG | OXYGEN SATURATION: 98 % | HEIGHT: 65 IN | BODY MASS INDEX: 33.82 KG/M2 | HEART RATE: 80 BPM | DIASTOLIC BLOOD PRESSURE: 76 MMHG | WEIGHT: 203 LBS

## 2024-01-23 DIAGNOSIS — R20.2 ARM PARESTHESIA, LEFT: ICD-10-CM

## 2024-01-23 DIAGNOSIS — R15.2 FECAL URGENCY: Primary | ICD-10-CM

## 2024-01-23 DIAGNOSIS — R79.82 ELEVATED C-REACTIVE PROTEIN (CRP): ICD-10-CM

## 2024-01-23 PROBLEM — K80.10 CALCULUS OF GALLBLADDER WITH CHRONIC CHOLECYSTITIS WITHOUT OBSTRUCTION: Status: RESOLVED | Noted: 2021-10-18 | Resolved: 2024-01-23

## 2024-01-23 PROCEDURE — 0 DIATRIZOATE MEGLUMINE & SODIUM PER 1 ML: Performed by: PHYSICIAN ASSISTANT

## 2024-01-23 PROCEDURE — 74177 CT ABD & PELVIS W/CONTRAST: CPT

## 2024-01-23 PROCEDURE — 25510000001 IOPAMIDOL PER 1 ML: Performed by: PHYSICIAN ASSISTANT

## 2024-01-23 RX ORDER — COLESEVELAM 180 1/1
1250 TABLET ORAL
Qty: 180 TABLET | Refills: 1 | Status: SHIPPED | OUTPATIENT
Start: 2024-01-23

## 2024-01-23 RX ADMIN — DIATRIZOATE MEGLUMINE AND DIATRIZOATE SODIUM 30 ML: 660; 100 LIQUID ORAL; RECTAL at 16:32

## 2024-01-23 RX ADMIN — IOPAMIDOL 100 ML: 755 INJECTION, SOLUTION INTRAVENOUS at 18:11

## 2024-01-23 NOTE — PROGRESS NOTES
"Subjective     Cesia Ambrose is a 28 y.o. female who presents with   Chief Complaint   Patient presents with    Alopecia     Digestive issue/fu       History of Present Illness     Having fecal urgency issues that has been worse since her second child Peng.  Seeing Dr. Arredondo and to have a CT today.  She does have a history of cholecystectomy.  She does not time the bowel movements with eating.    She's also being losing hair since she had Alf (3).  She is breastfeeding currently and 6 months post partum since having Peng.     She's tired but has two small kids, breastfeeding and working full time.     She's got 2-3 months of left forearm pain with radiating pain into the hand but not hitting her elbow on anything when it happens.    She's got some tenderness on the left side of the abdomen.  She was really unaware until she had an evaluation by GI.    She did lose 38 pounds since having Peng without a lot of effort but is breastfeeding.              Review of Systems     Objective     /76   Pulse 80   Ht 165.1 cm (65\")   Wt 92.1 kg (203 lb)   SpO2 98%   BMI 33.78 kg/m²     Physical Exam  Constitutional:       Appearance: Normal appearance.   Abdominal:      Palpations: Abdomen is soft.      Tenderness: There is abdominal tenderness (In the left upper quadrant).   Neurological:      Mental Status: She is alert.   Psychiatric:         Behavior: Behavior normal.         Thought Content: Thought content normal.         Procedures     Assessment & Plan   Diagnoses and all orders for this visit:    1. Fecal urgency (Primary)  Assessment & Plan:  Possibly post cholecystectomy issue.     Orders:  -     colesevelam (Welchol) 625 MG tablet; Take 2 tablets by mouth 3 (Three) Times a Day With Meals.  Dispense: 180 tablet; Refill: 1    2. Elevated C-reactive protein (CRP)  -     C-reactive Protein    3. Arm paresthesia, left  -     Vitamin B12    4. Lactating mother         Discussion    Patient " Instructions   I have ordered lab tests today.  You should receive a phone call or a [employer] message with those results.  If you have not heard from us in 7-10 days, please call the office.      Try Welchol before eating when you have a busy day.   We discussed theoretical risk with the medication.     Follow through with your CT tonight.               Trina Lechuga MD

## 2024-01-23 NOTE — PATIENT INSTRUCTIONS
I have ordered lab tests today.  You should receive a phone call or a [employer] message with those results.  If you have not heard from us in 7-10 days, please call the office.      Try Welchol before eating when you have a busy day.   We discussed theoretical risk with the medication.     Follow through with your CT tonight.

## 2024-01-24 LAB
CRP SERPL-MCNC: 0.97 MG/DL (ref 0–0.5)
VIT B12 SERPL-MCNC: 685 PG/ML (ref 211–946)

## 2024-02-22 ENCOUNTER — OFFICE VISIT (OUTPATIENT)
Dept: GASTROENTEROLOGY | Facility: CLINIC | Age: 29
End: 2024-02-22
Payer: COMMERCIAL

## 2024-02-22 VITALS
BODY MASS INDEX: 33.82 KG/M2 | HEART RATE: 88 BPM | DIASTOLIC BLOOD PRESSURE: 77 MMHG | WEIGHT: 203 LBS | OXYGEN SATURATION: 98 % | SYSTOLIC BLOOD PRESSURE: 126 MMHG | HEIGHT: 65 IN | TEMPERATURE: 97.7 F

## 2024-02-22 DIAGNOSIS — R19.7 DIARRHEA, UNSPECIFIED TYPE: Primary | ICD-10-CM

## 2024-02-22 DIAGNOSIS — R10.13 DYSPEPSIA: ICD-10-CM

## 2024-02-22 PROCEDURE — 99213 OFFICE O/P EST LOW 20 MIN: CPT | Performed by: PHYSICIAN ASSISTANT

## 2024-02-22 NOTE — PROGRESS NOTES
"Chief Complaint  Abdominal Pain    Subjective          History of Present Illness    Cesia Ambrose is a  28 y.o. female presents for follow-up on abdominal pain and diarrhea.  She is a patient of Dr. Arredondo last seen by me on 1/16/2024.    At last visit she was reporting diarrhea, urgency, and intermittent abdominal pain since the summertime.  Today, she reports improvement in diarrhea and urgency, stools are now normal, no blood, no abdominal pain. The only thing she changed was switching her daily water bottle from a [employer] to a different brand. Only vomited once after eating fast food since changing her water bottle.    Heartburn is also much better.  She denies nausea, vomiting, dysphagia.  She has no complaints today.    She had blood work and contrasted CT scan for above problems which did not show source of symptoms.  She did have mild hepatic steatosis, small hiatal hernia, and cholecystectomy on CT scan.  Thyroid and celiac panel were normal on 1/16/2024 blood work.    Objective   Vital Signs:   /77   Pulse 88   Temp 97.7 °F (36.5 °C) (Oral)   Ht 165.1 cm (65\")   Wt 92.1 kg (203 lb)   SpO2 98%   BMI 33.78 kg/m²       Physical Exam  Vitals reviewed.   Constitutional:       General: She is awake. She is not in acute distress.     Appearance: Normal appearance. She is well-developed and well-groomed.   HENT:      Head: Normocephalic.   Pulmonary:      Effort: Pulmonary effort is normal. No respiratory distress.   Skin:     Coloration: Skin is not pale.   Neurological:      Mental Status: She is alert and oriented to person, place, and time.      Gait: Gait is intact.   Psychiatric:         Mood and Affect: Mood and affect normal.         Speech: Speech normal.         Behavior: Behavior is cooperative.         Judgment: Judgment normal.          Result Review :             Assessment and Plan    Diagnoses and all orders for this visit:    1. Diarrhea, unspecified type (Primary)    2. " Dyspepsia    Above symptoms are now resolved.  No further workup is necessary.  She will follow-up as needed.    Follow Up   Return if symptoms worsen or fail to improve.    Dragon dictation used throughout this note.     Amira Granados PA-C

## 2024-02-23 NOTE — PROGRESS NOTES
Patient reports good fetal movement, physical exam reassuring cervix long thick and closed, GBS collected, patient continues to want to proceed with an elective primary  risk benefits potential complications again reviewed  
Surgical Assessment Completed on: 23-Feb-2024 07:37

## 2024-04-01 ENCOUNTER — PATIENT MESSAGE (OUTPATIENT)
Dept: GASTROENTEROLOGY | Facility: CLINIC | Age: 29
End: 2024-04-01
Payer: COMMERCIAL

## 2024-04-01 DIAGNOSIS — K62.5 RECTAL BLEEDING: Primary | ICD-10-CM

## 2024-04-01 NOTE — TELEPHONE ENCOUNTER
From: Cesia Ambrose  To: Amira Granados  Sent: 4/1/2024 12:40 PM EDT  Subject: Symptom return     Good afternoon,  You told me to message you if anything changed. I have had some more bleeding. It has been occurring on and off since Tuesday the 23rd. It comes back witg a bowl movement and is there when i wipe a few times throughout the day. But it is the worst it has ever been today day two of none stop bleeding. It is excessive bleeding and it's leaving blood on my underwear and dripping when I am standing up leaving blood on the floor and toliet bowl/seat. Can you please let me know what I can do to help this.

## 2024-04-03 DIAGNOSIS — K62.5 RECTAL BLEEDING: Primary | ICD-10-CM

## 2024-04-04 ENCOUNTER — TELEPHONE (OUTPATIENT)
Dept: GASTROENTEROLOGY | Facility: CLINIC | Age: 29
End: 2024-04-04
Payer: COMMERCIAL

## 2024-04-04 PROBLEM — K62.5 RECTAL BLEEDING: Status: ACTIVE | Noted: 2024-04-03

## 2024-04-04 NOTE — TELEPHONE ENCOUNTER
SUZANNE madera for COLONOSCOPY on 7/10/2024  arrive at  1;30  . Sent prep instructions to pt my chart....miralax

## 2024-05-15 ENCOUNTER — OFFICE VISIT (OUTPATIENT)
Dept: FAMILY MEDICINE CLINIC | Facility: CLINIC | Age: 29
End: 2024-05-15
Payer: COMMERCIAL

## 2024-05-15 VITALS
WEIGHT: 203 LBS | DIASTOLIC BLOOD PRESSURE: 76 MMHG | BODY MASS INDEX: 33.82 KG/M2 | HEART RATE: 93 BPM | HEIGHT: 65 IN | SYSTOLIC BLOOD PRESSURE: 110 MMHG | OXYGEN SATURATION: 96 %

## 2024-05-15 DIAGNOSIS — Z13.220 NEED FOR LIPID SCREENING: ICD-10-CM

## 2024-05-15 DIAGNOSIS — K62.5 RECTAL BLEEDING: ICD-10-CM

## 2024-05-15 DIAGNOSIS — G43.009 MIGRAINE WITHOUT AURA AND WITHOUT STATUS MIGRAINOSUS, NOT INTRACTABLE: ICD-10-CM

## 2024-05-15 DIAGNOSIS — Z00.00 ANNUAL PHYSICAL EXAM: Primary | ICD-10-CM

## 2024-05-15 DIAGNOSIS — J45.40 MODERATE PERSISTENT ASTHMA WITHOUT COMPLICATION: ICD-10-CM

## 2024-05-15 DIAGNOSIS — R74.01 ELEVATED TRANSAMINASE LEVEL: ICD-10-CM

## 2024-05-15 DIAGNOSIS — Z23 ENCOUNTER FOR VACCINATION: ICD-10-CM

## 2024-05-15 DIAGNOSIS — E66.9 OBESITY (BMI 30.0-34.9): ICD-10-CM

## 2024-05-15 PROBLEM — E66.811 OBESITY (BMI 30.0-34.9): Status: ACTIVE | Noted: 2021-01-20

## 2024-05-15 PROBLEM — O99.820 GBS (GROUP B STREPTOCOCCUS CARRIER), +RV CULTURE, CURRENTLY PREGNANT: Status: RESOLVED | Noted: 2023-07-05 | Resolved: 2024-05-15

## 2024-05-15 PROBLEM — K80.20 GALLSTONES: Status: RESOLVED | Noted: 2021-10-19 | Resolved: 2024-05-15

## 2024-05-15 PROBLEM — O21.9 NAUSEA/VOMITING IN PREGNANCY: Status: RESOLVED | Noted: 2022-12-16 | Resolved: 2024-05-15

## 2024-05-15 PROCEDURE — 99395 PREV VISIT EST AGE 18-39: CPT | Performed by: FAMILY MEDICINE

## 2024-05-15 PROCEDURE — 90471 IMMUNIZATION ADMIN: CPT | Performed by: FAMILY MEDICINE

## 2024-05-15 PROCEDURE — 90677 PCV20 VACCINE IM: CPT | Performed by: FAMILY MEDICINE

## 2024-05-15 NOTE — PROGRESS NOTES
"Chief Complaint  Annual Exam    Subjective    {CC  Problem List  Visit  Diagnosis   Encounters  Notes  Medications  Labs  Result Review Imaging  Media :23}     Cesia Ambrose presents to [facility] for Annual Exam.    History of Present Illness     Annual Exam    Last pap smear: 6/20/2022  Last mammogram: not indicated  Contraception:  is planning a vasectomy  Regular menstrual cycles: no but still pumping  Last colonoscopy: not indicated  Ever screened for Hepatitis C: yes  Vaccines: due covid and pneumonia  Exercise: no formal exercise but active with two little kids.   Smoking status: never  Alcohol use: none right now and stopped sodas  Sunscreen use: yes   with two small kids.  Working at a [employer]    Asthma on Symbicort and uses her rescue 1-2 times a week.    Migraine frequency dramatically reduced when she had her first child (Alf 3).   She can just take tylenol if she gets a headache which has only happened once since having Peng (10 month)    Had been having some blood in her stools with diarrhea and that has resolved with getting rid of a breast milk supplement.  Everything is normal now.     Review of Systems     Objective       Vital Signs:   /76   Pulse 93   Ht 165.1 cm (65\")   Wt 92.1 kg (203 lb)   SpO2 96%   BMI 33.78 kg/m²     Body mass index is 33.78 kg/m².       PHQ-9 Total Score:       Physical Exam  Constitutional:       General: She is not in acute distress.     Appearance: Normal appearance.   HENT:      Head: Normocephalic and atraumatic.      Nose: Nose normal.   Eyes:      Conjunctiva/sclera: Conjunctivae normal.      Pupils: Pupils are equal, round, and reactive to light.   Cardiovascular:      Rate and Rhythm: Normal rate and regular rhythm.      Heart sounds: Normal heart sounds.   Pulmonary:      Effort: Pulmonary effort is normal.      Breath sounds: Normal breath sounds.   Abdominal:      General: Bowel sounds are normal. "      Palpations: Abdomen is soft.   Musculoskeletal:      Cervical back: Neck supple.   Skin:     General: Skin is warm.   Neurological:      General: No focal deficit present.      Mental Status: She is alert.      Gait: Gait normal.   Psychiatric:         Behavior: Behavior normal.          Result Review  Data Reviewed:{ Labs  Result Review  Imaging  Med Tab  Media :23}               Discussed healthy diet, exercise, adequate sleep, cancer screening, immunizations and preventative care. Annual eye exam and routine dental cleaning encouraged.        Assessment and Plan {CC Problem List  Visit Diagnosis  ROS  Review (Popup)  [facility] Maintenance  Quality  BestPractice  Medications  SmartSets  SnapShot Encounters  Media :23}   Diagnoses and all orders for this visit:    1. Annual physical exam (Primary)    2. Migraine without aura and without status migrainosus, not intractable  Assessment & Plan:  Using prn Tylenol               3. Moderate persistent asthma without complication  Assessment & Plan:  On Symbicort and prn albuterol            4. Rectal bleeding  Assessment & Plan:  Resolved off breast mild supplement, to follow up with GI.     Orders:  -     CBC & Differential    5. Elevated transaminase level  -     Comprehensive Metabolic Panel    6. Need for lipid screening  -     Comprehensive Metabolic Panel  -     Lipid Panel    7. Encounter for vaccination  -     Pneumococcal Conjugate Vaccine 20-Valent All    8. Obesity (BMI 30.0-34.9)  Assessment & Plan:  Patient's (Body mass index is 33.78 kg/m².) indicates that they are obese (BMI >30) with health conditions that include none . Weight is improving with lifestyle modifications. BMI  is above average; BMI management plan is completed. We discussed portion control and increasing exercise as able.           Patient Instructions   I have ordered lab tests today.  You should receive a phone call or a [employer] message with those results.  If you have  not heard from us in 7-10 days, please call the office.      You've done a good job with losing baby weight.       No follow-ups on file.    Trina Lechuga MD

## 2024-05-15 NOTE — PATIENT INSTRUCTIONS
I have ordered lab tests today.  You should receive a phone call or a [employer] message with those results.  If you have not heard from us in 7-10 days, please call the office.      You've done a good job with losing baby weight.

## 2024-05-15 NOTE — ASSESSMENT & PLAN NOTE
Patient's (Body mass index is 33.78 kg/m².) indicates that they are obese (BMI >30) with health conditions that include none . Weight is improving with lifestyle modifications. BMI  is above average; BMI management plan is completed. We discussed portion control and increasing exercise as able.

## 2024-05-16 LAB
ALBUMIN SERPL-MCNC: 4.6 G/DL (ref 3.5–5.2)
ALBUMIN/GLOB SERPL: 1.8 G/DL
ALP SERPL-CCNC: 81 U/L (ref 39–117)
ALT SERPL-CCNC: 33 U/L (ref 1–33)
AST SERPL-CCNC: 21 U/L (ref 1–32)
BASOPHILS # BLD AUTO: 0.08 10*3/MM3 (ref 0–0.2)
BASOPHILS NFR BLD AUTO: 1 % (ref 0–1.5)
BILIRUB SERPL-MCNC: 0.4 MG/DL (ref 0–1.2)
BUN SERPL-MCNC: 10 MG/DL (ref 6–20)
BUN/CREAT SERPL: 14.7 (ref 7–25)
CALCIUM SERPL-MCNC: 9.3 MG/DL (ref 8.6–10.5)
CHLORIDE SERPL-SCNC: 105 MMOL/L (ref 98–107)
CHOLEST SERPL-MCNC: 74 MG/DL (ref 0–200)
CO2 SERPL-SCNC: 24 MMOL/L (ref 22–29)
CREAT SERPL-MCNC: 0.68 MG/DL (ref 0.57–1)
EGFRCR SERPLBLD CKD-EPI 2021: 121.1 ML/MIN/1.73
EOSINOPHIL # BLD AUTO: 0.25 10*3/MM3 (ref 0–0.4)
EOSINOPHIL NFR BLD AUTO: 3.2 % (ref 0.3–6.2)
ERYTHROCYTE [DISTWIDTH] IN BLOOD BY AUTOMATED COUNT: 13.2 % (ref 12.3–15.4)
GLOBULIN SER CALC-MCNC: 2.6 GM/DL
GLUCOSE SERPL-MCNC: 86 MG/DL (ref 65–99)
HCT VFR BLD AUTO: 43.5 % (ref 34–46.6)
HDLC SERPL-MCNC: 50 MG/DL (ref 40–60)
HGB BLD-MCNC: 14.1 G/DL (ref 12–15.9)
IMM GRANULOCYTES # BLD AUTO: 0.03 10*3/MM3 (ref 0–0.05)
IMM GRANULOCYTES NFR BLD AUTO: 0.4 % (ref 0–0.5)
LDLC SERPL CALC-MCNC: 15 MG/DL (ref 0–100)
LYMPHOCYTES # BLD AUTO: 1.44 10*3/MM3 (ref 0.7–3.1)
LYMPHOCYTES NFR BLD AUTO: 18.3 % (ref 19.6–45.3)
MCH RBC QN AUTO: 27.3 PG (ref 26.6–33)
MCHC RBC AUTO-ENTMCNC: 32.4 G/DL (ref 31.5–35.7)
MCV RBC AUTO: 84.3 FL (ref 79–97)
MONOCYTES # BLD AUTO: 0.44 10*3/MM3 (ref 0.1–0.9)
MONOCYTES NFR BLD AUTO: 5.6 % (ref 5–12)
NEUTROPHILS # BLD AUTO: 5.62 10*3/MM3 (ref 1.7–7)
NEUTROPHILS NFR BLD AUTO: 71.5 % (ref 42.7–76)
NRBC BLD AUTO-RTO: 0 /100 WBC (ref 0–0.2)
PLATELET # BLD AUTO: 330 10*3/MM3 (ref 140–450)
POTASSIUM SERPL-SCNC: 4.8 MMOL/L (ref 3.5–5.2)
PROT SERPL-MCNC: 7.2 G/DL (ref 6–8.5)
RBC # BLD AUTO: 5.16 10*6/MM3 (ref 3.77–5.28)
SODIUM SERPL-SCNC: 140 MMOL/L (ref 136–145)
TRIGL SERPL-MCNC: 16 MG/DL (ref 0–150)
VLDLC SERPL CALC-MCNC: 9 MG/DL (ref 5–40)
WBC # BLD AUTO: 7.86 10*3/MM3 (ref 3.4–10.8)

## 2024-06-10 ENCOUNTER — TELEPHONE (OUTPATIENT)
Dept: GASTROENTEROLOGY | Facility: CLINIC | Age: 29
End: 2024-06-10

## 2024-06-10 NOTE — TELEPHONE ENCOUNTER
Provider: DR GAUTAM    Caller: NATE    Relationship to Patient: SELF    Phone Number: [phone]    Reason for Call: PATIENT CALLED IN, SHE IS SCHEDULED FOR A COLONOSCOPY 7/10/24 AND SHE WANTS THAT CANCELED PLEASE. SHE DOESN'T WANT TO RESCHEDULE IT.

## 2024-09-11 ENCOUNTER — OFFICE VISIT (OUTPATIENT)
Dept: OBSTETRICS AND GYNECOLOGY | Age: 29
End: 2024-09-11
Payer: COMMERCIAL

## 2024-09-11 VITALS
DIASTOLIC BLOOD PRESSURE: 74 MMHG | WEIGHT: 215 LBS | BODY MASS INDEX: 35.82 KG/M2 | HEIGHT: 65 IN | SYSTOLIC BLOOD PRESSURE: 110 MMHG

## 2024-09-11 DIAGNOSIS — Z12.4 SCREENING FOR CERVICAL CANCER: ICD-10-CM

## 2024-09-11 DIAGNOSIS — Z01.419 ENCOUNTER FOR GYNECOLOGICAL EXAMINATION: Primary | ICD-10-CM

## 2024-09-11 PROCEDURE — 99395 PREV VISIT EST AGE 18-39: CPT | Performed by: OBSTETRICS & GYNECOLOGY

## 2024-09-11 NOTE — PROGRESS NOTES
Subjective     Chief Complaint   Patient presents with    Gynecologic Exam     Annual:last pap          History of Present Illness    Wellness exam  Cesia Amrbose is a very pleasant  28 y.o. female who presents for annual exam.  Mammo Exam deferred, Contraception 's had a vasectomy, Exercise encouraged  Cesia is has no GYN concerns or complaints her 2 children are doing well cycles are okay.      Obstetric History:  OB History          2    Para   2    Term   2       0    AB   0    Living   2         SAB   0    IAB   0    Ectopic   0    Molar   0    Multiple   0    Live Births   2               Menstrual History:     Patient's last menstrual period was 2024 (exact date).       Sexual History:       Past Medical History:   Diagnosis Date    Anemia     Anxiety     Asthma     Calculus of gallbladder with chronic cholecystitis without obstruction     Cholelithiasis     Environmental allergies     Gallstones 10/19/2021    Formatting of this note might be different from the original.  Added automatically from request for surgery [id number]      GBS (group B Streptococcus carrier), +RV culture, currently pregnant 2023    GERD (gastroesophageal reflux disease)     Migraine     Nausea/vomiting in pregnancy 2022    OCD (obsessive compulsive disorder)      Past Surgical History:   Procedure Laterality Date     SECTION N/A 2021    Procedure:  SECTION PRIMARY;  Surgeon: Chavez Garcia MD;  Location: [facility] LABOR DELIVERY;  Service: Obstetrics/Gynecology;  Laterality: N/A;     SECTION N/A 2023    Procedure:  SECTION REPEAT;  Surgeon: Chavez Garcia MD;  Location: [facility] LABOR DELIVERY;  Service: Obstetrics/Gynecology;  Laterality: N/A;    CHOLECYSTECTOMY      CYST REMOVAL      LAPAROSCOPIC CHOLECYSTECTOMY  1030    NASAL SEPTUM SURGERY      WISDOM TOOTH EXTRACTION         SOCIAL Hx:      The following portions of the patient's history were reviewed  "and updated as appropriate: allergies, current medications, past family history, past medical history, past social history, past surgical history and problem list.    Review of Systems        Except as outlined in history of physical illness, patient denies any changes in her GYN, , GI systems.  All other systems reviewed were negative.         Current Outpatient Medications:     albuterol sulfate  (90 Base) MCG/ACT inhaler, , Disp: , Rfl:     cetirizine (zyrTEC) 10 MG tablet, , Disp: , Rfl:     prenatal vitamin (prenatal, CLASSIC, vitamin) tablet, Take 1 tablet by mouth Daily., Disp: , Rfl:     Symbicort 160-4.5 MCG/ACT inhaler, Inhale 2 puffs 2 (Two) Times a Day., Disp: , Rfl:     valACYclovir (Valtrex) 1000 MG tablet, Take 1 tablet by mouth 2 (Two) Times a Day. For one day per episode, Disp: 16 tablet, Rfl: 1    levalbuterol (XOPENEX HFA) 45 MCG/ACT inhaler, INHALE TWO PUFFS BY MOUTH FOUR TIMES A DAY AS NEEDED (Patient not taking: Reported on 9/11/2024), Disp: 15 g, Rfl: 1   Objective   Physical Exam    /74   Ht 165.1 cm (65\")   Wt 97.5 kg (215 lb)   LMP 08/29/2024 (Exact Date)   BMI 35.78 kg/m²     General: Patient is alert and oriented and appears overall healthy  Neck: Is supple without thyromegaly, no carotid bruits and no lymphadenopathy  Lungs: Clear bilaterally, no wheezing, rhonchi, or rales.  Respiratory rate is normal  Breast: Even, symmetrical, no lymphadenopathy, no retraction, no discharge, no masses or cysts  Heart: Regular rate and rhythm are appreciated, no murmurs or rubs are heard  Abdomen: Is soft, without organomegaly, bowel sounds are positive, there is no rebound or guarding and palpation does not produce any discomfort  Back: Nontender without CVA tenderness  Pelvic: External genitalia appear normal and consistent with mature female.  BUS normal                            Vagina is clean dry without discharge and appears adequately estrogenized, no lesions or masses are " present                         Cervix is noninflamed without discharge or lesions.  There is no cervical motion tenderness.                Uterus is nonenlarged, without tenderness, and no masses or abnormalities are  present               Adnexa are non-enlarged, non tender               Rectal exam reveals adequate sphincter tone and no masses or lesions are appreciated on digital rectal examination.      Annual Well Woman Exam  Patient Active Problem List   Diagnosis    Migraine without aura and without status migrainosus, not intractable    Family history of breast cancer    BRCA negative    Gastroesophageal reflux disease without esophagitis    Obesity (BMI 30.0-34.9)    Moderate persistent asthma without complication     delivery delivered    S/P repeat low transverse     Fecal urgency    Rectal bleeding    Elevated transaminase level                 Assessment & Plan   Diagnoses and all orders for this visit:    1. Encounter for gynecological examination (Primary)  -     IGP, Apt HPV,rfx 16 / 18,45    2. Screening for cervical cancer    Reassuring clinical exam today    Discussed today's findings and concerns with patient.  Continue to recommend regular exercise including cardiovascular and resistance training as well as  breast self-exam. Wellness lab, mammography, & pap smear, in accordance with age guidelines.    I have encouraged her to call for today's test results if she has not received them within 10 days.  Patient is advised to call with any change in her condition or with any other questions, otherwise return  for annual examination.

## 2024-09-15 LAB
CYTOLOGIST CVX/VAG CYTO: NORMAL
CYTOLOGY CVX/VAG DOC CYTO: NORMAL
CYTOLOGY CVX/VAG DOC THIN PREP: NORMAL
DX ICD CODE: NORMAL
HPV I/H RISK 4 DNA CVX QL PROBE+SIG AMP: NEGATIVE
Lab: NORMAL
OTHER STN SPEC: NORMAL
STAT OF ADQ CVX/VAG CYTO-IMP: NORMAL

## 2024-11-27 ENCOUNTER — OFFICE VISIT (OUTPATIENT)
Dept: FAMILY MEDICINE CLINIC | Facility: CLINIC | Age: 29
End: 2024-11-27
Payer: COMMERCIAL

## 2024-11-27 VITALS
RESPIRATION RATE: 18 BRPM | HEIGHT: 65 IN | SYSTOLIC BLOOD PRESSURE: 124 MMHG | BODY MASS INDEX: 35.59 KG/M2 | DIASTOLIC BLOOD PRESSURE: 81 MMHG | OXYGEN SATURATION: 99 % | HEART RATE: 87 BPM | WEIGHT: 213.6 LBS

## 2024-11-27 DIAGNOSIS — J06.9 VIRAL UPPER RESPIRATORY TRACT INFECTION: Primary | ICD-10-CM

## 2024-11-27 DIAGNOSIS — J45.40 MODERATE PERSISTENT ASTHMA WITHOUT COMPLICATION: ICD-10-CM

## 2024-11-27 RX ORDER — CEFDINIR 300 MG/1
300 CAPSULE ORAL 2 TIMES DAILY
Qty: 10 CAPSULE | Refills: 0 | Status: SHIPPED | OUTPATIENT
Start: 2024-11-27 | End: 2024-12-02

## 2024-11-27 RX ORDER — LEVALBUTEROL TARTRATE 45 UG/1
2 AEROSOL, METERED ORAL EVERY 4 HOURS PRN
Qty: 15 G | Refills: 1 | Status: SHIPPED | OUTPATIENT
Start: 2024-11-27

## 2024-11-27 RX ORDER — CEFDINIR 300 MG/1
CAPSULE ORAL
COMMUNITY
Start: 2024-10-09 | End: 2024-11-27 | Stop reason: SDUPTHER

## 2024-11-27 NOTE — ASSESSMENT & PLAN NOTE
Erythema to left tympanic membrane.  No marked ear pain at this time.  If facial pains worsens or persist, okay to use Omnicef.  Just in case prescription given.  If cough worsens or persists, obtain chest x-ray.    Otherwise, supportive care with honey/hydration, over-the-counter's.

## 2024-11-27 NOTE — PROGRESS NOTES
"Chief Complaint  URI    Subjective        Cesia Ambrose presents to [facility]  History of Present Illness  29-year-old female presents to clinic for several days of upper respiratory tract infection symptoms.  Declines viral testing.  No fevers or chills.  No chest pain or shortness of breath.  URI         Objective   Vital Signs:  /81   Pulse 87   Resp 18   Ht 165.1 cm (65\")   Wt 96.9 kg (213 lb 9.6 oz)   SpO2 99%   BMI 35.54 kg/m²   Estimated body mass index is 35.54 kg/m² as calculated from the following:    Height as of this encounter: 165.1 cm (65\").    Weight as of this encounter: 96.9 kg (213 lb 9.6 oz).            Physical Exam  Constitutional:       Appearance: Normal appearance.   HENT:      Head: Normocephalic and atraumatic.      Right Ear: Tympanic membrane, ear canal and external ear normal.      Left Ear: Ear canal and external ear normal. Tympanic membrane is erythematous.      Nose: Nose normal.      Mouth/Throat:      Mouth: Mucous membranes are moist.   Eyes:      General: Lids are normal.      Conjunctiva/sclera: Conjunctivae normal.   Cardiovascular:      Rate and Rhythm: Normal rate and regular rhythm.      Heart sounds: Normal heart sounds.   Pulmonary:      Effort: Pulmonary effort is normal.      Breath sounds: Normal breath sounds.   Musculoskeletal:      Cervical back: Normal range of motion.   Skin:     General: Skin is warm and dry.   Neurological:      General: No focal deficit present.      Mental Status: She is alert and oriented to person, place, and time.      Gait: Gait is intact.   Psychiatric:         Mood and Affect: Mood normal.         Behavior: Behavior normal.         Thought Content: Thought content normal.        Result Review :                Assessment and Plan   Diagnoses and all orders for this visit:    1. Viral upper respiratory tract infection (Primary)  Assessment & Plan:  Erythema to left tympanic membrane.  No marked ear " pain at this time.  If facial pains worsens or persist, okay to use Omnicef.  Just in case prescription given.  If cough worsens or persists, obtain chest x-ray.    Otherwise, supportive care with honey/hydration, over-the-counter's.    Orders:  -     XR Chest PA & Lateral; Future    2. Moderate persistent asthma without complication  Assessment & Plan:  Xopenex renewed.            Other orders  -     cefdinir (OMNICEF) 300 MG capsule; Take 1 capsule by mouth 2 (Two) Times a Day for 5 days. Take in event of worsening L ear pain or facial pain.  Dispense: 10 capsule; Refill: 0  -     levalbuterol (XOPENEX HFA) 45 MCG/ACT inhaler; Inhale 2 puffs Every 4 (Four) Hours As Needed for Wheezing.  Dispense: 15 g; Refill: 1             Follow Up   No follow-ups on file.  Patient was given instructions and counseling regarding her condition or for health maintenance advice. Please see specific information pulled into the AVS if appropriate.

## 2024-12-17 ENCOUNTER — PATIENT MESSAGE (OUTPATIENT)
Dept: FAMILY MEDICINE CLINIC | Facility: CLINIC | Age: 29
End: 2024-12-17
Payer: COMMERCIAL

## 2025-01-03 RX ORDER — LEVALBUTEROL TARTRATE 45 UG/1
2 AEROSOL, METERED ORAL EVERY 4 HOURS PRN
Qty: 15 G | Refills: 1 | Status: SHIPPED | OUTPATIENT
Start: 2025-01-03

## 2025-01-14 ENCOUNTER — OFFICE VISIT (OUTPATIENT)
Dept: FAMILY MEDICINE CLINIC | Facility: CLINIC | Age: 30
End: 2025-01-14
Payer: COMMERCIAL

## 2025-01-14 VITALS
WEIGHT: 217.4 LBS | HEART RATE: 105 BPM | BODY MASS INDEX: 36.22 KG/M2 | HEIGHT: 65 IN | OXYGEN SATURATION: 99 % | SYSTOLIC BLOOD PRESSURE: 112 MMHG | DIASTOLIC BLOOD PRESSURE: 80 MMHG

## 2025-01-14 DIAGNOSIS — M25.511 ACUTE PAIN OF RIGHT SHOULDER: Primary | ICD-10-CM

## 2025-01-14 DIAGNOSIS — R29.898 RIGHT ARM WEAKNESS: ICD-10-CM

## 2025-01-14 PROCEDURE — 99213 OFFICE O/P EST LOW 20 MIN: CPT | Performed by: FAMILY MEDICINE

## 2025-01-14 RX ORDER — METHYLPREDNISOLONE 4 MG/1
TABLET ORAL
Qty: 21 TABLET | Refills: 0 | Status: SHIPPED | OUTPATIENT
Start: 2025-01-14

## 2025-01-14 NOTE — PATIENT INSTRUCTIONS
I gave you a steroid pack for your shoulder and ordered PT.   We may need to consider an MRI or nerve study depending on how your respond.      You are eligible for flu and covid vaccine if you decide you want them.

## 2025-01-14 NOTE — PROGRESS NOTES
"Subjective     Cesia Ambrose is a 29 y.o. female who presents with   Chief Complaint   Patient presents with    Shoulder Pain     Right, arm numbness        History of Present Illness     Distant history of shoulder injury and it flares up intermittently.  It gets triggered by repetitive motion and it's been bad for the past 6 weeks with a constant pain and after shoveling her garage Friday, hasn't really felt the arm since. She can move it but it hurts to move.                Review of Systems     Objective     /80   Pulse 105   Ht 165.1 cm (65\")   Wt 98.6 kg (217 lb 6.4 oz)   SpO2 99%   Breastfeeding No   BMI 36.18 kg/m²     Physical Exam  Constitutional:       Appearance: Normal appearance.   Musculoskeletal:         General: Tenderness (in the muscles around the right shoulder and decreased  strength.  Doesn't want to move the right arm.) present.   Neurological:      Mental Status: She is alert.   Psychiatric:         Behavior: Behavior normal.         Thought Content: Thought content normal.         Procedures     Assessment & Plan   Diagnoses and all orders for this visit:    1. Acute pain of right shoulder (Primary)  -     methylPREDNISolone (MEDROL) 4 MG dose pack; Take as directed on package instructions.  Dispense: 21 tablet; Refill: 0  -     Ambulatory Referral to Physical Therapy for Evaluation & Treatment    2. Right arm weakness  -     methylPREDNISolone (MEDROL) 4 MG dose pack; Take as directed on package instructions.  Dispense: 21 tablet; Refill: 0  -     Ambulatory Referral to Physical Therapy for Evaluation & Treatment             Discussion    Patient Instructions   I gave you a steroid pack for your shoulder and ordered PT.   We may need to consider an MRI or nerve study depending on how your respond.      You are eligible for flu and covid vaccine if you decide you want them.               Trina Lechuga MD           "

## 2025-01-29 ENCOUNTER — HOSPITAL ENCOUNTER (OUTPATIENT)
Dept: PHYSICAL THERAPY | Facility: HOSPITAL | Age: 30
Setting detail: THERAPIES SERIES
Discharge: HOME | End: 2025-01-29
Payer: COMMERCIAL

## 2025-01-29 DIAGNOSIS — M25.511 ACUTE PAIN OF RIGHT SHOULDER: Primary | ICD-10-CM

## 2025-01-29 DIAGNOSIS — R29.898 RIGHT ARM WEAKNESS: ICD-10-CM

## 2025-01-29 PROCEDURE — 97161 PT EVAL LOW COMPLEX 20 MIN: CPT | Performed by: PHYSICAL THERAPIST

## 2025-01-29 NOTE — THERAPY EVALUATION
Outpatient Physical Therapy Ortho Initial Evaluation   Manjit     Patient Name: Cesia Ambrose  : 1995  MRN: [id number]  Today's Date: 2025      Visit Date: 2025    Patient Active Problem List   Diagnosis    Migraine without aura and without status migrainosus, not intractable    Family history of breast cancer    BRCA negative    Gastroesophageal reflux disease without esophagitis    Obesity (BMI 30.0-34.9)    Moderate persistent asthma without complication     delivery delivered    S/P repeat low transverse     Fecal urgency    Rectal bleeding    Elevated transaminase level    Viral upper respiratory tract infection        Past Medical History:   Diagnosis Date    Allergic     Anemia     Anxiety     Asthma     Calculus of gallbladder with chronic cholecystitis without obstruction     Cholelithiasis     Environmental allergies     Gallstones 10/19/2021    Formatting of this note might be different from the original.  Added automatically from request for surgery [id number]      GBS (group B Streptococcus carrier), +RV culture, currently pregnant 2023    GERD (gastroesophageal reflux disease)     Low back pain     Migraine     Nausea/vomiting in pregnancy 2022    OCD (obsessive compulsive disorder)         Past Surgical History:   Procedure Laterality Date     SECTION N/A 2021    Procedure:  SECTION PRIMARY;  Surgeon: Chavez Garcia MD;  Location: [facility] LABOR DELIVERY;  Service: Obstetrics/Gynecology;  Laterality: N/A;     SECTION N/A 2023    Procedure:  SECTION REPEAT;  Surgeon: Chavez Garcia MD;  Location: [facility] LABOR DELIVERY;  Service: Obstetrics/Gynecology;  Laterality: N/A;    CHOLECYSTECTOMY      CYST REMOVAL      LAPAROSCOPIC CHOLECYSTECTOMY  1030    NASAL SEPTUM SURGERY      SINUS SURGERY      WISDOM TOOTH EXTRACTION         Visit Dx:     ICD-10-CM ICD-9-CM   1. Acute pain of right shoulder  M25.511 719.41   2.  "Right arm weakness  R29.898 729.89          Patient History       Row Name 01/29/25 0700             History    Chief Complaint Pain;Muscle weakness;Difficulty with daily activities  -SP      Type of Pain Shoulder pain;Upper Extremity / Arm  -SP      Date Current Problem(s) Began --  December 2024  -SP      Brief Description of Current Complaint Patient reports that she started having issues with right UE in high school and had \"cysts that were removed\" described as superficial.  Patient reports that symptoms improvedover time  She states that she deep cleaned a car and her house in December and flared up her symptoms.  Patient reports that in scapula area she has a \"hot feeling\"  Pain goes into bicep area with numbness into mid bicep to forearm and hand to all finger except her thumb.  She does notice weakness in her arm, difficulty gripping and spasm in her hand intermittently.  Patient has had no imaging on her neck or shoulder.  Patient reports that she has intermittent radiation of symptoms into left shoulder and UE.  -SP      Previous treatment for THIS PROBLEM Rehabilitation  remote, in high school and approximately 6 years ago  -SP      Patient/Caregiver Goals Relieve pain;Know what to do to help the symptoms  -SP      Patient's Rating of General Health Good  -SP      Hand Dominance right-handed  -SP      Occupation/sports/leisure activities customer service, accounting, full time desk work  -SP      How has patient tried to help current problem? OTC meds, rest  -SP         Pain     Pain Location Arm;Shoulder  -SP      Pain at Present 8  -SP      Pain at Best 6  -SP      Pain at Worst 10  -SP      Pain Frequency Constant/continuous  -SP      Pain Description Burning;Cramping;Numbness  -SP      What Performance Factors Make the Current Problem(s) WORSE? repetitive motion, elevation-reaching overhead, lifting and carrying  -SP      What Performance Factors Make the Current Problem(s) BETTER? guard arm at side  " -SP      Tolerance Time- Standing unlimited  -SP      Tolerance Time- Sitting unlimited  -SP      Tolerance Time- Walking unlimited  -SP      Is your sleep disturbed? Yes  -SP      What position do you sleep in? Left sidelying  moves frequently  -SP      Difficulties at work? difficulty tolerating prolonged work/desk duties  -SP      Difficulties with ADL's? difficulty tolerating household duties  -SP         Fall Risk Assessment    Any falls in the past year: No  -SP         Daily Activities    Primary Language English  -SP      Are you able to read Yes  -SP      Are you able to write Yes  -SP      How does patient learn best? Listening;Reading;Demonstration;Pictures/Video  -SP      Teaching needs identified Home Exercise Program;Management of Condition  -SP      Does patient have problems with the following? Anxiety  -SP      Barriers to learning None  -SP      Pt Participated in POC and Goals Yes  -SP         Safety    Are you being hurt, hit, or frightened by anyone at home or in your life? No  -SP      Are you being neglected by a caregiver No  -SP                User Key  (r) = Recorded By, (t) = Taken By, (c) = Cosigned By      Initials Name Provider Type    SP Evy Christianson, PT Physical Therapist                     PT Ortho       Row Name 01/29/25 0900       Posture/Observations    Forward Head Mild  -SP    Cervical Lordosis Decreased  -SP    Rounded Shoulders Bilateral:;Moderate  -SP    Posture/Observations Comments Patient observed to guard arm to her side with forearm across her stomach  -SP       DTR- Upper Quarter Clearing    Biceps (C5/6) Bilateral:;2- Normal response  -SP    Brachioradialis (C6) Bilateral:;2- Normal response  -SP    Triceps (C7) Bilateral:;2- Normal response  -SP       Neural Tension Signs- Upper Quarter Clearing    ULNTT 1 Right:;Negative  -SP    ULNTT 2 Right:;Negative  -SP    ULNTT 3 Right:;Negative  -SP       Sensory Screen for Light Touch- Upper Quarter Clearing    C4  (posterior shoulder) Right:  patient reports it feels heavier  -SP    C5 (lateral upper arm) Bilateral:;Intact  -SP    C6 (tip of thumb) Bilateral:;Intact  -SP    C7 (tip of 3rd finger) Bilateral:;Intact  -SP    C8 (tip of 5th finger) Bilateral:;Intact  -SP    T1 (medial lower arm) Bilateral:;Intact  -SP       Shoulder Girdle Accessory Motions    Posterior glide of humerus Right:;Hypomobile  -SP       Shoulder Impingement/Rotator Cuff Special Tests    Landin-Franco Test (RC Lesion vs. Bursitis) Right:;Positive  -SP    Speed's Test (LH of Biceps Lesion) Right:;Negative  -SP       Shoulder Girdle Palpation    Subacromial Space Right:;Tender  -SP    Supraspinatus Insertion Right:;Tender  -SP    Deltoid Right:;Tender  -SP    Pect Minor Right:;Guarded/taut  -SP    Upper Trap Right:;Tender;Guarded/taut  -SP       Head/Neck/Trunk    Neck Extension AROM 39 degrees  -SP    Neck Flexion AROM 31 degrees  -SP    Neck Lt Lateral Flexion AROM 14 degrees with complaints of UT tightness  -SP    Neck Rt Lateral Flexion AROM 34 degrees  -SP    Neck Lt Rotation AROM 53 degrees  -SP    Neck Rt Rotation AROM 62 degrees with complaints of tightness  -SP       Right Upper Ext    Rt Shoulder Abduction AROM 72 degrees with pain  -SP    Rt Shoulder Abduction PROM 170 degrees with pain  -SP    Rt Shoulder Flexion AROM 94 degrees with pain  -SP    Rt Shoulder Flexion PROM 155 degrees with pain  -SP    Rt Shoulder External Rotation AROM difficulty reaching to back of head  -SP    Rt Shoulder External Rotation PROM 90 degrees  -SP    Rt Shoulder Internal Rotation AROM able to reach to L5 level of spine  -SP    Rt Shoulder Internal Rotation PROM 72 degrees with pain  -SP    Rt Elbow Extension/Flexion AROM WFL  -SP       MMT (Manual Muscle Testing)    General MMT Comments pain limited strength tests of right UE, patient gives way with testing all planes  -SP       MMT Right Upper Ext    Rt Shoulder Flexion MMT, Gross Movement (4-/5) good minus   -SP    Rt Shoulder Extension MMT, Gross Movement (4/5) good  -SP    Rt Shoulder ABduction MMT, Gross Movement (4-/5) good minus  -SP    Rt Shoulder Internal Rotation MMT, Gross Movement (4/5) good  -SP    Rt Shoulder External Rotation MMT, Gross Movement (3/5) fair  -SP    Rt Scapular ADduction MMT, Gross Movement (3/5) fair  -SP    Rt Elbow Flexion MMT, Gross Movement: (4-/5) good minus  -SP    Rt Elbow Extension MMT, Gross Movement: (4-/5) good minus  -SP        Strength Right    # Reps 3  -SP    Right Rung 2  -SP    Right  Test 1 20  -SP    Right  Test 2 30  -SP    Right  Test 3 35  -SP     Strength Average Right 28.33  -SP        Strength Left    # Reps 3  -SP    Left Rung 2  -SP    Left  Test 1 62  -SP    Left  Test 2 65  -SP    Left  Test 3 60  -SP     Strength Average Left 62.33  -SP       Upper Extremity Flexibility    Upper Trapezius Right:;Moderately limited;Left:;Mildly limited  -SP    Levator Scapula Right:;Moderately limited  -SP    Pect Minor Right:;Moderately limited  -SP       Transfers    Bed-Chair [place] (Transfers) independent  -SP    Chair-Bed [place] (Transfers) independent  -SP    Sit-Stand [place] (Transfers) independent  -SP    Stand-Sit [place] (Transfers) independent  -SP       Gait/Stairs (Locomotion)    [place] Level (Gait) independent  -SP              User Key  (r) = Recorded By, (t) = Taken By, (c) = Cosigned By      Initials Name Provider Type    SP Evy Christianson, PT Physical Therapist                                Therapy Education  Given: HEP, Posture/body mechanics  Program: New  How Provided: Verbal, Written  Provided to: Patient  Level of Understanding: Verbalized, Demonstrated      PT OP Goals       Row Name 01/29/25 1000          PT Short Term Goals    STG Date to Achieve 02/13/25  -SP     STG 1 Patient demonstrates cervical spine AROM to WFL without complaints of tightness at end range  -SP     STG 2  Patient demonstrates right shoulder AROM into flexion and scaption to >120 degrees  -SP     STG 3 Patient reports that she is able to sleep through the night without waking due to right UE pain.  -SP        Long Term Goals    LTG Date to Achieve 02/28/25  -SP     LTG 1 Patient demonstrates right shoulder AROM to WFL without complaints of pain at end ranges  -SP     LTG 2 Patient demonstrates right UE strength to >4/5 throughout  -SP     LTG 3 Patient able to perform home, work and community ADLs with pain level 0-1/10  -SP     LTG 4 Patient independent with HEP  -SP        Time Calculation    PT Goal Re-Cert Due Date 02/28/25  -SP               User Key  (r) = Recorded By, (t) = Taken By, (c) = Cosigned By      Initials Name Provider Type    Evy Deng, PT Physical Therapist                     PT Assessment/Plan       Row Name 01/29/25 1106          PT Assessment    Functional Limitations Limitation in home management;Limitations in community activities;Performance in work activities;Performance in self-care ADL;Performance in leisure activities;Limitations in functional capacity and performance  -SP     Assessment Comments Patient reports chronic history of right shoulder pain that significantly increased in December 2024 after heavy car detailing and house cleaning.  Patient presents with pain, decreased right shoulder ROM, impaired sensation, decreased strength and difficulty performing home, work and community ADLs  -SP     Please refer to paper survey for additional self-reported information Yes  -SP     Rehab Potential Good  -SP     Patient/caregiver participated in establishment of treatment plan and goals Yes  -SP     Patient would benefit from skilled therapy intervention Yes  -SP        PT Plan    PT Frequency 1x/week;2x/week  -SP     Predicted Duration of Therapy Intervention (PT) 4 weeks  -SP     Planned CPT's? PT EVAL LOW COMPLEXITY: 81836;PT THER PROC EA 15 MIN: 11321;PT MANUAL  THERAPY EA 15 MIN: 56996;PT ELECTRICAL STIM UNATTEND: ;PT HOT OR COLD PACK TREAT MCARE  -SP               User Key  (r) = Recorded By, (t) = Taken By, (c) = Cosigned By      Initials Name Provider Type    Evy Deng, BECK Physical Therapist                     Modalities       Row Name 01/29/25 0900             Moist Heat    MH Applied Yes  -SP      Location bilateral shoulders/UT area: patient in supine with roll under knees  -SP      PT Moist Heat Minutes 10  -SP                User Key  (r) = Recorded By, (t) = Taken By, (c) = Cosigned By      Initials Name Provider Type    Evy Deng, PT Physical Therapist                   OP Exercises       Row Name 01/29/25 1000             Exercise 1    Exercise Name 1 supine shoulder fleixon: cane  -SP      Cueing 1 Verbal  -SP      Reps 1 10  -SP         Exercise 2    Exercise Name 2 supine on towel roll for pec stretch  -SP      Cueing 2 Verbal  -SP      Time 2 3 min  -SP         Exercise 3    Exercise Name 3 wall walk  -SP      Reps 3 10  -SP         Exercise 4    Exercise Name 4 scapula retraction  -SP      Reps 4 15  -SP         Exercise 5    Exercise Name 5 UT stretch  -SP      Reps 5 3  -SP      Additional Comments 15 sec  -SP                User Key  (r) = Recorded By, (t) = Taken By, (c) = Cosigned By      Initials Name Provider Type    Evy Deng, PT Physical Therapist                                  Outcome Measure Options: Quick DASH  Quick DASH  Open a tight or new jar.: Mild Difficulty  Do heavy household chores (e.g., wash walls, wash floors): Severe Difficulty  Carry a shopping bag or briefcase: Moderate Difficulty  Wash your back: Severe Difficulty  Use a knife to cut food: No Difficulty  Recreational activities in which you take some force or impact through your arm, should or hand (e.g. golf, hammering, tennis, etc.): Severe Difficulty  During the past week, to what extent has your arm, shoulder, or  hand problem interfered with your normal social activites with family, friends, neighbors or groups?: Moderately  During the past week, were you limited in your work or other regular daily activities as a result of your arm, shoulder or hand problem?: Moderately Limited  Arm, Shoulder, or hand pain: Severe  Tingling (pins and needles) in your arm, shoulder, or hand: Extreme  During the past week, how much difficulty have you had sleeping because of the pain in your arm, shoulder or hand?: Severe Difficulty  Number of Questions Answered: 11  Quick DASH Score: 59.09  Work Module (Optional)  Using your usual technique for your work?: Moderate Difficulty  Doing your usual work because of arm, shoulder or hand pain?: Severe Difficulty  Doing your work as well as you would like?: Moderate Difficulty  Spending your usual amount of time doing your work?: Moderate Difficulty  Work Module Score: 56.25         Time Calculation:     Start Time: 0700  Stop Time: 0800  Time Calculation (min): 60 min  Untimed Charges  PT Eval/Re-eval Minutes: 60  PT Moist Heat Minutes: 10  Total Minutes  Untimed Charges Total Minutes: 60   Total Minutes: 60     Therapy Charges for Today       Code Description Service Date Service Provider Modifiers Qty    27042019335 HC PT EVAL LOW COMPLEXITY 4 1/29/2025 Evy Christianson, PT GP 1            PT G-Codes  Outcome Measure Options: Quick DASH  Quick DASH Score: 59.09         Evy Christianson, PT  1/29/2025

## 2025-02-03 ENCOUNTER — HOSPITAL ENCOUNTER (OUTPATIENT)
Dept: PHYSICAL THERAPY | Facility: HOSPITAL | Age: 30
Setting detail: THERAPIES SERIES
Discharge: HOME | End: 2025-02-03
Payer: COMMERCIAL

## 2025-02-03 DIAGNOSIS — M25.511 ACUTE PAIN OF RIGHT SHOULDER: Primary | ICD-10-CM

## 2025-02-03 DIAGNOSIS — R29.898 RIGHT ARM WEAKNESS: ICD-10-CM

## 2025-02-03 PROCEDURE — 97140 MANUAL THERAPY 1/> REGIONS: CPT | Performed by: PHYSICAL THERAPIST

## 2025-02-03 PROCEDURE — 97110 THERAPEUTIC EXERCISES: CPT | Performed by: PHYSICAL THERAPIST

## 2025-02-03 NOTE — THERAPY TREATMENT NOTE
Outpatient Physical Therapy Ortho Treatment Note   Manjit     Patient Name: Cesia Ambrose  : 1995  MRN: [id number]  Today's Date: 2/3/2025      Visit Date: 2025    Visit Dx:    ICD-10-CM ICD-9-CM   1. Acute pain of right shoulder  M25.511 719.41   2. Right arm weakness  R29.898 729.89       Patient Active Problem List   Diagnosis    Migraine without aura and without status migrainosus, not intractable    Family history of breast cancer    BRCA negative    Gastroesophageal reflux disease without esophagitis    Obesity (BMI 30.0-34.9)    Moderate persistent asthma without complication     delivery delivered    S/P repeat low transverse     Fecal urgency    Rectal bleeding    Elevated transaminase level    Viral upper respiratory tract infection        Past Medical History:   Diagnosis Date    Allergic     Anemia     Anxiety     Asthma     Calculus of gallbladder with chronic cholecystitis without obstruction     Cholelithiasis     Environmental allergies     Gallstones 10/19/2021    Formatting of this note might be different from the original.  Added automatically from request for surgery [id number]      GBS (group B Streptococcus carrier), +RV culture, currently pregnant 2023    GERD (gastroesophageal reflux disease)     Low back pain     Migraine     Nausea/vomiting in pregnancy 2022    OCD (obsessive compulsive disorder)         Past Surgical History:   Procedure Laterality Date     SECTION N/A 2021    Procedure:  SECTION PRIMARY;  Surgeon: Chavez Garcia MD;  Location: [facility] LABOR DELIVERY;  Service: Obstetrics/Gynecology;  Laterality: N/A;     SECTION N/A 2023    Procedure:  SECTION REPEAT;  Surgeon: Chavez Garcia MD;  Location: [facility] LABOR DELIVERY;  Service: Obstetrics/Gynecology;  Laterality: N/A;    CHOLECYSTECTOMY      CYST REMOVAL      LAPAROSCOPIC CHOLECYSTECTOMY  1030    NASAL SEPTUM SURGERY      SINUS SURGERY       WISDOM TOOTH EXTRACTION          PT Ortho       Row Name 02/03/25 0700       Subjective    Subjective Comments Patient reports that she has been doing exercises at home.  She has not noticed any significant change in symptoms at this point.  -SP              User Key  (r) = Recorded By, (t) = Taken By, (c) = Cosigned By      Initials Name Provider Type    Evy Deng, PT Physical Therapist                                 PT Assessment/Plan       Row Name 02/03/25 0803          PT Assessment    Assessment Comments Patient tolerates progression of shoulder/postural muscle strengthening well without complaints of increased pain  -SP        PT Plan    PT Plan Comments Continue to progress as tolerable  -SP               User Key  (r) = Recorded By, (t) = Taken By, (c) = Cosigned By      Initials Name Provider Type    Evy Deng, PT Physical Therapist                       OP Exercises       Row Name 02/03/25 0804 02/03/25 0700          Subjective    Subjective Comments -- Patient reports that she has been doing exercises at home.  She has not noticed any significant change in symptoms at this point.  -SP        Total Minutes    82356 - PT Therapeutic Exercise Minutes 15  -SP --     52642 - PT Manual Therapy Minutes 15  -SP --        Exercise 1    Exercise Name 1 -- supine shoulder flexion: cane  -SP     Cueing 1 -- Verbal  -SP     Reps 1 -- 10  -SP        Exercise 2    Exercise Name 2 -- supine on towel roll for pec stretch  -SP     Cueing 2 -- Verbal  -SP     Time 2 -- 5 min  -SP        Exercise 3    Exercise Name 3 -- wall walk  -SP     Reps 3 -- 10  -SP        Exercise 4    Exercise Name 4 -- tband rows  -SP     Reps 4 -- 20  -SP     Time 4 -- green tband  -SP        Exercise 5    Exercise Name 5 -- UT stretch  -SP     Reps 5 -- 3  -SP     Time 5 -- 15 sec  -SP        Exercise 6    Exercise Name 6 -- bilateral shoulder external rotation with scapula retraction  -SP     Reps 6 -- 15   -SP     Time 6 -- yellow tband  -SP               User Key  (r) = Recorded By, (t) = Taken By, (c) = Cosigned By      Initials Name Provider Type    Evy Deng, BECK Physical Therapist                             Manual Rx (Last 36 Hours)       Manual Treatments       Row Name 02/03/25 0804 02/03/25 0700          Total Minutes    13743 - PT Manual Therapy Minutes 15  -SP --        Manual Rx 1    Manual Rx 1 Location -- cervical spine  -SP     Manual Rx 1 Type -- manual cervical traction 15 sec x 5  -SP     Manual Rx 1 Duration -- 5 min  -SP        Manual Rx 2    Manual Rx 2 Location -- right shoulder  -SP     Manual Rx 2 Type -- PROM into all planes  -SP     Manual Rx 2 Duration -- 10 min  -SP               User Key  (r) = Recorded By, (t) = Taken By, (c) = Cosigned By      Initials Name Provider Type    Evy Deng PT Physical Therapist                        Therapy Education  Given: HEP  Program: Reinforced, Progressed  How Provided: Verbal, Written  Provided to: Patient  Level of Understanding: Verbalized, Demonstrated              Time Calculation:   Start Time: 0700  Stop Time: 0800  Time Calculation (min): 60 min  Timed Charges  69936 - PT Therapeutic Exercise Minutes: 15  66139 - PT Manual Therapy Minutes: 15  Total Minutes  Timed Charges Total Minutes: 30   Total Minutes: 30  Therapy Charges for Today       Code Description Service Date Service Provider Modifiers Qty    30964303990 HC PT THER PROC EA 15 MIN 2/3/2025 Evy Christianson, PT GP 1    94333877181 HC PT MANUAL THERAPY EA 15 MIN 2/3/2025 Evy Christianson, PT GP 1                      Evy Christianson PT  2/3/2025

## 2025-02-13 ENCOUNTER — HOSPITAL ENCOUNTER (OUTPATIENT)
Dept: PHYSICAL THERAPY | Facility: HOSPITAL | Age: 30
Setting detail: THERAPIES SERIES
Discharge: HOME | End: 2025-02-13
Payer: COMMERCIAL

## 2025-02-13 DIAGNOSIS — M25.511 ACUTE PAIN OF RIGHT SHOULDER: Primary | ICD-10-CM

## 2025-02-13 DIAGNOSIS — R29.898 RIGHT ARM WEAKNESS: ICD-10-CM

## 2025-02-13 PROCEDURE — 97140 MANUAL THERAPY 1/> REGIONS: CPT | Performed by: PHYSICAL THERAPIST

## 2025-02-13 PROCEDURE — 97110 THERAPEUTIC EXERCISES: CPT | Performed by: PHYSICAL THERAPIST

## 2025-02-13 NOTE — THERAPY TREATMENT NOTE
Outpatient Physical Therapy Ortho Treatment Note   Manjit     Patient Name: Cesia Ambrose  : 1995  MRN: [id number]  Today's Date: 2025      Visit Date: 2025    Visit Dx:    ICD-10-CM ICD-9-CM   1. Acute pain of right shoulder  M25.511 719.41   2. Right arm weakness  R29.898 729.89       Patient Active Problem List   Diagnosis    Migraine without aura and without status migrainosus, not intractable    Family history of breast cancer    BRCA negative    Gastroesophageal reflux disease without esophagitis    Obesity (BMI 30.0-34.9)    Moderate persistent asthma without complication     delivery delivered    S/P repeat low transverse     Fecal urgency    Rectal bleeding    Elevated transaminase level    Viral upper respiratory tract infection        Past Medical History:   Diagnosis Date    Allergic     Anemia     Anxiety     Asthma     Calculus of gallbladder with chronic cholecystitis without obstruction     Cholelithiasis     Environmental allergies     Gallstones 10/19/2021    Formatting of this note might be different from the original.  Added automatically from request for surgery [id number]      GBS (group B Streptococcus carrier), +RV culture, currently pregnant 2023    GERD (gastroesophageal reflux disease)     Low back pain     Migraine     Nausea/vomiting in pregnancy 2022    OCD (obsessive compulsive disorder)         Past Surgical History:   Procedure Laterality Date     SECTION N/A 2021    Procedure:  SECTION PRIMARY;  Surgeon: Chavez Garcia MD;  Location: [facility] LABOR DELIVERY;  Service: Obstetrics/Gynecology;  Laterality: N/A;     SECTION N/A 2023    Procedure:  SECTION REPEAT;  Surgeon: Chavez Garcia MD;  Location: [facility] LABOR DELIVERY;  Service: Obstetrics/Gynecology;  Laterality: N/A;    CHOLECYSTECTOMY      CYST REMOVAL      LAPAROSCOPIC CHOLECYSTECTOMY  1030    NASAL SEPTUM SURGERY      SINUS SURGERY       WISDOM TOOTH EXTRACTION          PT Ortho       Row Name 02/13/25 0800       Subjective    Subjective Comments Patient reports that she is feeling better.  She has been doing exercises at home.  She is not noticing any shoulder pain but states that bilateral UTs feel tight.  -SP              User Key  (r) = Recorded By, (t) = Taken By, (c) = Cosigned By      Initials Name Provider Type    Evy Deng, PT Physical Therapist                                 PT Assessment/Plan       Row Name 02/13/25 0823          PT Assessment    Assessment Comments Patient with decreased shoulder pain and is noticing more symptoms in cervical spine area.  Patient tolerates progression of postural muscle strengthening  -SP        PT Plan    PT Plan Comments Continue postural muscle strengthening; follow up in 2 weeks  -SP               User Key  (r) = Recorded By, (t) = Taken By, (c) = Cosigned By      Initials Name Provider Type    Evy Deng, PT Physical Therapist                     Modalities       Row Name 02/13/25 0700             Moist Heat    MH Applied Yes  -SP      Location bilateral shoulders/UT area: patient in supine with roll under knees  -SP      PT Moist Heat Minutes 10  -SP                User Key  (r) = Recorded By, (t) = Taken By, (c) = Cosigned By      Initials Name Provider Type    Evy Deng, PT Physical Therapist                   OP Exercises       Row Name 02/13/25 0824 02/13/25 0800 02/13/25 0700       Subjective    Subjective Comments -- Patient reports that she is feeling better.  She has been doing exercises at home.  She is not noticing any shoulder pain but states that bilateral UTs feel tight.  -SP --       Total Minutes    68993 - PT Therapeutic Exercise Minutes 15  -SP -- --    16215 - PT Manual Therapy Minutes 10  -SP -- --       Exercise 1    Exercise Name 1 -- -- supine shoulder flexion: cane  -SP    Cueing 1 -- -- Verbal  -SP    Reps 1 -- -- 10   -SP       Exercise 2    Exercise Name 2 -- -- supine on towel roll for pec stretch  -SP    Cueing 2 -- -- Verbal  -SP    Time 2 -- -- 5 min  -SP       Exercise 3    Exercise Name 3 -- -- wall angels  -SP    Reps 3 -- -- 5  -SP       Exercise 4    Exercise Name 4 -- -- tband rows  -SP    Reps 4 -- -- 20  -SP    Time 4 -- -- green tband  -SP       Exercise 5    Exercise Name 5 -- -- UT stretch  -SP    Reps 5 -- -- 3  -SP    Time 5 -- -- 15 sec  -SP       Exercise 6    Exercise Name 6 -- -- bilateral shoulder external rotation with scapula retraction  -SP    Reps 6 -- -- 15  -SP    Time 6 -- -- yellow tband  -SP       Exercise 7    Exercise Name 7 -- -- shoulder extension with scapula retraction  -SP    Reps 7 -- -- 20  -SP    Time 7 -- -- green tband  -SP              User Key  (r) = Recorded By, (t) = Taken By, (c) = Cosigned By      Initials Name Provider Type    Evy Deng, PT Physical Therapist                             Manual Rx (Last 36 Hours)       Manual Treatments       Row Name 02/13/25 0824 02/13/25 0700          Total Minutes    53485 - PT Manual Therapy Minutes 10  -SP --        Manual Rx 1    Manual Rx 1 Location -- cervical spine  -SP     Manual Rx 1 Type -- manual cervical traction 15 sec x 5  -SP     Manual Rx 1 Duration -- 5 min  -SP               User Key  (r) = Recorded By, (t) = Taken By, (c) = Cosigned By      Initials Name Provider Type    Evy Deng PT Physical Therapist                        Therapy Education  Given: HEP  Program: Reinforced, Progressed  How Provided: Verbal, Written  Provided to: Patient  Level of Understanding: Verbalized, Demonstrated              Time Calculation:   Start Time: 0653  Stop Time: 0800  Time Calculation (min): 67 min  Timed Charges  40057 - PT Therapeutic Exercise Minutes: 15  96579 - PT Manual Therapy Minutes: 10  Untimed Charges  PT Moist Heat Minutes: 10  Total Minutes  Timed Charges Total Minutes: 25  Untimed Charges  Total Minutes: 10   Total Minutes: 25  Therapy Charges for Today       Code Description Service Date Service Provider Modifiers Qty    61090508868 HC PT THER PROC EA 15 MIN 2/13/2025 Evy Christianson, PT GP 1    10821316844 HC PT MANUAL THERAPY EA 15 MIN 2/13/2025 Evy Christianson, PT GP 1                      Evy Christianson, PT  2/13/2025

## 2025-02-24 ENCOUNTER — HOSPITAL ENCOUNTER (OUTPATIENT)
Dept: PHYSICAL THERAPY | Facility: HOSPITAL | Age: 30
Setting detail: THERAPIES SERIES
Discharge: HOME | End: 2025-02-24
Payer: COMMERCIAL

## 2025-02-24 DIAGNOSIS — R29.898 RIGHT ARM WEAKNESS: ICD-10-CM

## 2025-02-24 DIAGNOSIS — M25.511 ACUTE PAIN OF RIGHT SHOULDER: Primary | ICD-10-CM

## 2025-02-24 NOTE — THERAPY TREATMENT NOTE
Outpatient Physical Therapy Ortho Treatment Note   Manjit     Patient Name: Cesia Ambrose  : 1995  MRN: [id number]  Today's Date: 2025      Visit Date: 2025    Visit Dx:    ICD-10-CM ICD-9-CM   1. Acute pain of right shoulder  M25.511 719.41   2. Right arm weakness  R29.898 729.89       Patient Active Problem List   Diagnosis    Migraine without aura and without status migrainosus, not intractable    Family history of breast cancer    BRCA negative    Gastroesophageal reflux disease without esophagitis    Obesity (BMI 30.0-34.9)    Moderate persistent asthma without complication     delivery delivered    S/P repeat low transverse     Fecal urgency    Rectal bleeding    Elevated transaminase level    Viral upper respiratory tract infection        Past Medical History:   Diagnosis Date    Allergic     Anemia     Anxiety     Asthma     Calculus of gallbladder with chronic cholecystitis without obstruction     Cholelithiasis     Environmental allergies     Gallstones 10/19/2021    Formatting of this note might be different from the original.  Added automatically from request for surgery [id number]      GBS (group B Streptococcus carrier), +RV culture, currently pregnant 2023    GERD (gastroesophageal reflux disease)     Low back pain     Migraine     Nausea/vomiting in pregnancy 2022    OCD (obsessive compulsive disorder)         Past Surgical History:   Procedure Laterality Date     SECTION N/A 2021    Procedure:  SECTION PRIMARY;  Surgeon: Chavez Garcia MD;  Location: [facility] LABOR DELIVERY;  Service: Obstetrics/Gynecology;  Laterality: N/A;     SECTION N/A 2023    Procedure:  SECTION REPEAT;  Surgeon: Chavez Garcia MD;  Location: [facility] LABOR DELIVERY;  Service: Obstetrics/Gynecology;  Laterality: N/A;    CHOLECYSTECTOMY      CYST REMOVAL      LAPAROSCOPIC CHOLECYSTECTOMY  1030    NASAL SEPTUM SURGERY      SINUS SURGERY       WISDOM TOOTH EXTRACTION          PT Ortho       Row Name 02/24/25 0700       Subjective    Subjective Comments Patient reports that she is feeling better and is not having any right side cervical area pain.  However, she is feeling numbness into left arm to hand.  -SP              User Key  (r) = Recorded By, (t) = Taken By, (c) = Cosigned By      Initials Name Provider Type    Evy Deng, PT Physical Therapist                                 PT Assessment/Plan       Row Name 02/24/25 0724          PT Assessment    Assessment Comments Patient reports decreased radicular type symptoms into left UE following treatment.  -SP        PT Plan    PT Plan Comments Assess response to dry needling  -SP               User Key  (r) = Recorded By, (t) = Taken By, (c) = Cosigned By      Initials Name Provider Type    Evy Deng, PT Physical Therapist                     Modalities       Row Name 02/24/25 0700             Moist Heat    MH Applied Yes  -SP      Location bilateral shoulders/UT area: patient in supine with roll under knees  -SP      PT Moist Heat Minutes 10  -SP         Traction 77345    Traction Type Cervical  -SP      PT Traction Rx Minutes 15  -SP      Duration Intermittent  -SP      Position Supine  90/90  -SP      Weight 14  -SP      Hold 60  -SP      Relax 10  -SP                User Key  (r) = Recorded By, (t) = Taken By, (c) = Cosigned By      Initials Name Provider Type    Evy Deng, PT Physical Therapist                   OP Exercises       Row Name 02/24/25 0700             Subjective    Subjective Comments Patient reports that she is feeling better and is not having any right side cervical area pain.  However, she is feeling numbness into left arm to hand.  -SP                User Key  (r) = Recorded By, (t) = Taken By, (c) = Cosigned By      Initials Name Provider Type    Evy Deng, PT Physical Therapist                                      Therapy Education  Education Details: Patient educated regarding cervical mechanical traction  Given: HEP  Program: Reinforced  How Provided: Verbal  Provided to: Patient  Level of Understanding: Verbalized, Demonstrated              Time Calculation:   Start Time: 0755  Stop Time: 0900  Time Calculation (min): 65 min  Untimed Charges  PT Traction Rx Minutes: 15  PT Moist Heat Minutes: 10  Total Minutes  Untimed Charges Total Minutes: 25   Total Minutes: 25                Evy Christianson, PT  2/24/2025

## 2025-02-25 RX ORDER — LEVALBUTEROL TARTRATE 45 UG/1
2 AEROSOL, METERED ORAL EVERY 4 HOURS PRN
Qty: 15 G | Refills: 1 | Status: SHIPPED | OUTPATIENT
Start: 2025-02-25

## 2025-02-25 NOTE — TELEPHONE ENCOUNTER
Rx Refill Note  Requested Prescriptions     Pending Prescriptions Disp Refills    levalbuterol (XOPENEX HFA) 45 MCG/ACT inhaler [Pharmacy Med Name: LEVALBUTEROL TAR HFA 45MCG INH] 15 g 1     Sig: INHALE 2 PUFFS BY MOUTH EVERY 4 HOURS AS NEEDED FOR WHEEZING      Last office visit with prescribing clinician: 11/27/2024   Last telemedicine visit with prescribing clinician: Visit date not found   Next office visit with prescribing clinician: Visit date not found       Anthony Zapata  02/25/25, 15:53 EST

## 2025-03-10 ENCOUNTER — HOSPITAL ENCOUNTER (OUTPATIENT)
Dept: PHYSICAL THERAPY | Facility: HOSPITAL | Age: 30
Setting detail: THERAPIES SERIES
Discharge: HOME | End: 2025-03-10
Payer: COMMERCIAL

## 2025-03-10 DIAGNOSIS — M25.511 ACUTE PAIN OF RIGHT SHOULDER: Primary | ICD-10-CM

## 2025-03-10 DIAGNOSIS — R29.898 RIGHT ARM WEAKNESS: ICD-10-CM

## 2025-03-10 NOTE — THERAPY TREATMENT NOTE
Outpatient Physical Therapy Ortho Treatment Note   Manjit     Patient Name: Cesia Ambrose  : 1995  MRN: [id number]  Today's Date: 3/10/2025      Visit Date: 03/10/2025    Visit Dx:    ICD-10-CM ICD-9-CM   1. Acute pain of right shoulder  M25.511 719.41   2. Right arm weakness  R29.898 729.89       Patient Active Problem List   Diagnosis    Migraine without aura and without status migrainosus, not intractable    Family history of breast cancer    BRCA negative    Gastroesophageal reflux disease without esophagitis    Obesity (BMI 30.0-34.9)    Moderate persistent asthma without complication     delivery delivered    S/P repeat low transverse     Fecal urgency    Rectal bleeding    Elevated transaminase level    Viral upper respiratory tract infection        Past Medical History:   Diagnosis Date    Allergic     Anemia     Anxiety     Asthma     Calculus of gallbladder with chronic cholecystitis without obstruction 10/18/2021    Cholelithiasis     Environmental allergies     Gallstones 10/19/2021    Formatting of this note might be different from the original.  Added automatically from request for surgery [id number]      GBS (group B Streptococcus carrier), +RV culture, currently pregnant 2023    GERD (gastroesophageal reflux disease)     Low back pain     Migraine     Nausea/vomiting in pregnancy 2022    OCD (obsessive compulsive disorder)         Past Surgical History:   Procedure Laterality Date     SECTION N/A 2021    Procedure:  SECTION PRIMARY;  Surgeon: Chavez Garcia MD;  Location: [facility] LABOR DELIVERY;  Service: Obstetrics/Gynecology;  Laterality: N/A;     SECTION N/A 2023    Procedure:  SECTION REPEAT;  Surgeon: Chavez Garcia MD;  Location: [facility] LABOR DELIVERY;  Service: Obstetrics/Gynecology;  Laterality: N/A;    CHOLECYSTECTOMY      CYST REMOVAL      LAPAROSCOPIC CHOLECYSTECTOMY  1030    NASAL SEPTUM SURGERY       SINUS SURGERY      WISDOM TOOTH EXTRACTION          PT Ortho       Row Name 03/10/25 0700       Subjective    Subjective Comments Patient reports that she is feeling much better and currently has not pain or limitation.  She is only occasionally noticing numbness into her right arm and only if she is lying on it.  -SP       Head/Neck/Trunk    Neck Extension AROM WFL  -SP    Neck Flexion AROM WFL  -SP    Neck Lt Lateral Flexion AROM WFL  -SP    Neck Rt Lateral Flexion AROM WFL  -SP    Neck Lt Rotation AROM WFL  -SP    Neck Rt Rotation AROM WFL  -SP       Right Upper Ext    Rt Shoulder Abduction AROM WFL  -SP    Rt Shoulder Flexion AROM WFL  -SP    Rt Shoulder External Rotation AROM WFL  -SP    Rt Shoulder Internal Rotation AROM WFL  -SP              User Key  (r) = Recorded By, (t) = Taken By, (c) = Cosigned By      Initials Name Provider Type    Evy Deng, PT Physical Therapist                                 PT Assessment/Plan       Row Name 03/10/25 0744          PT Assessment    Assessment Comments Patient demonstrates good right shoulder and cervical spine ROM and decreased symptoms.  She has met goals and is indenpendent with HEP  -SP        PT Plan    PT Plan Comments Patient to continue with HEP  -SP               User Key  (r) = Recorded By, (t) = Taken By, (c) = Cosigned By      Initials Name Provider Type    Evy Deng, PT Physical Therapist                     Modalities       Row Name 03/10/25 0700             Moist Heat    MH Applied Yes  -SP      Location bilateral shoulders/UT area: patient in supine with roll under knees  -SP      PT Moist Heat Minutes 10  -SP         Traction 19770    Traction Type Cervical  -SP      PT Traction Rx Minutes 15  -SP      Position Supine  90/90  -SP      Weight 14  -SP      Hold 60  -SP      Relax 10  -SP                User Key  (r) = Recorded By, (t) = Taken By, (c) = Cosigned By      Initials Name Provider Type    SUBHASH Christianson  Evy Suh, PT Physical Therapist                   OP Exercises       Row Name 03/10/25 0700             Subjective    Subjective Comments Patient reports that she is feeling much better and currently has not pain or limitation.  She is only occasionally noticing numbness into her right arm and only if she is lying on it.  -SP                User Key  (r) = Recorded By, (t) = Taken By, (c) = Cosigned By      Initials Name Provider Type    Evy Deng, PT Physical Therapist                                  PT OP Goals       Row Name 03/10/25 0700          PT Short Term Goals    STG Date to Achieve 02/13/25  -SP     STG 1 Patient demonstrates cervical spine AROM to WFL without complaints of tightness at end range  -SP     STG 1 Progress Met  -SP     STG 2 Patient demonstrates right shoulder AROM into flexion and scaption to >120 degrees  -SP     STG 2 Progress Met  -SP     STG 3 Patient reports that she is able to sleep through the night without waking due to right UE pain.  -SP     STG 3 Progress Met  -SP        Long Term Goals    LTG Date to Achieve 02/28/25  -SP     LTG 1 Patient demonstrates right shoulder AROM to WFL without complaints of pain at end ranges  -SP     LTG 1 Progress Met  -SP     LTG 2 Patient demonstrates right UE strength to >4/5 throughout  -SP     LTG 2 Progress Met  -SP     LTG 3 Patient able to perform home, work and community ADLs with pain level 0-1/10  -SP     LTG 3 Progress Met  -SP     LTG 4 Patient independent with HEP  -SP     LTG 4 Progress Met  -SP               User Key  (r) = Recorded By, (t) = Taken By, (c) = Cosigned By      Initials Name Provider Type    Evy Deng, PT Physical Therapist                    Therapy Education  Given: HEP, Posture/body mechanics  Program: Reinforced  How Provided: Verbal  Provided to: Patient  Level of Understanding: Verbalized, Demonstrated              Time Calculation:   Start Time: 0700  Stop Time: 0748  Time  Calculation (min): 48 min  Untimed Charges  PT Traction Rx Minutes: 15  PT Moist Heat Minutes: 10  Total Minutes  Untimed Charges Total Minutes: 25   Total Minutes: 25                Evy Christianson, PT  3/10/2025

## 2025-04-25 RX ORDER — LEVALBUTEROL TARTRATE 45 UG/1
2 AEROSOL, METERED ORAL EVERY 4 HOURS PRN
Qty: 15 G | Refills: 1 | Status: SHIPPED | OUTPATIENT
Start: 2025-04-25

## 2025-04-25 NOTE — TELEPHONE ENCOUNTER
Rx Refill Note  Requested Prescriptions     Pending Prescriptions Disp Refills    levalbuterol (XOPENEX HFA) 45 MCG/ACT inhaler 15 g 1     Si puffs Every 4 (Four) Hours As Needed for Wheezing.      Last office visit with prescribing clinician: 2025   Last telemedicine visit with prescribing clinician: Visit date not found   Next office visit with prescribing clinician: 2025       Anthony Zapata  25, 08:44 EDT

## 2025-05-20 ENCOUNTER — OFFICE VISIT (OUTPATIENT)
Dept: FAMILY MEDICINE CLINIC | Facility: CLINIC | Age: 30
End: 2025-05-20
Payer: COMMERCIAL

## 2025-05-20 VITALS
HEIGHT: 65 IN | SYSTOLIC BLOOD PRESSURE: 110 MMHG | WEIGHT: 213.7 LBS | OXYGEN SATURATION: 98 % | HEART RATE: 83 BPM | DIASTOLIC BLOOD PRESSURE: 68 MMHG | BODY MASS INDEX: 35.6 KG/M2

## 2025-05-20 DIAGNOSIS — K76.0 FATTY LIVER DISEASE, NONALCOHOLIC: ICD-10-CM

## 2025-05-20 DIAGNOSIS — J45.40 MODERATE PERSISTENT ASTHMA WITHOUT COMPLICATION: ICD-10-CM

## 2025-05-20 DIAGNOSIS — N92.6 IRREGULAR MENSES: ICD-10-CM

## 2025-05-20 DIAGNOSIS — Z00.00 ANNUAL PHYSICAL EXAM: Primary | ICD-10-CM

## 2025-05-20 DIAGNOSIS — L65.9 HAIR THINNING: ICD-10-CM

## 2025-05-20 DIAGNOSIS — G43.009 MIGRAINE WITHOUT AURA AND WITHOUT STATUS MIGRAINOSUS, NOT INTRACTABLE: ICD-10-CM

## 2025-05-20 DIAGNOSIS — Z13.220 NEED FOR LIPID SCREENING: ICD-10-CM

## 2025-05-20 PROBLEM — R74.01 ELEVATED TRANSAMINASE LEVEL: Status: RESOLVED | Noted: 2024-05-15 | Resolved: 2025-05-20

## 2025-05-20 RX ORDER — CALCIUM CARBONATE 500 MG/1
1 TABLET, CHEWABLE ORAL DAILY
COMMUNITY

## 2025-05-20 RX ORDER — FLUTICASONE PROPIONATE 50 MCG
SPRAY, SUSPENSION (ML) NASAL
COMMUNITY
Start: 2025-02-06

## 2025-05-20 RX ORDER — FAMOTIDINE 20 MG/1
20 TABLET, FILM COATED ORAL 2 TIMES DAILY
COMMUNITY

## 2025-05-20 NOTE — PROGRESS NOTES
"Chief Complaint  Annual Exam    Subjective    {CC  Problem List  Visit  Diagnosis   Encounters  Notes  Medications  Labs  Result Review Imaging  Media :23}     Cesia Ambrose presents to [facility] for Annual Exam.    History of Present Illness     Annual Exam    Last pap smear: 9/11/2024 with Dr. Garcia  Last mammogram: not indicated   Contraception: none because Kenji has had a vasectomy  Regular menstrual cycles: no 21-38 days and heavy for 5 days  Last colonoscopy: was scheduled and cancelled, ordered for gi issues through Amira Granados PA-C  Ever screened for Hepatitis C: yes  Vaccines: UTD except declines COVID  Exercise: walking every day and doing some weight training.   Smoking status: former  Alcohol use: no  Sunscreen use: regular use and seeing dermatology later today.    with two boys 2 and 4.      She's having trouble with hair loss.  No bald spots but lots of loss and it's much less thick than in the past.  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     Mild fatty liver and she's working on weight loss but it doesn't happen.  Cutting her portions.       Migraines are down to 1-2 a year and seem to relate to stress.    Moderate persistent asthma on Symbicort regularly.  She has Xopenex as needed and she doesn't use it much but her 2 year old (Peng) moves them and then she has to get a refill.  She sees Dr. Zayas for this.     Review of Systems     Objective       Vital Signs:   /68   Pulse 83   Ht 165.1 cm (65\")   Wt 96.9 kg (213 lb 11.2 oz)   SpO2 98%   BMI 35.56 kg/m²     Body mass index is 35.56 kg/m².      PHQ-9 Total Score:      Physical Exam  Constitutional:       General: She is not in acute distress.     Appearance: Normal appearance.   HENT:      Head: Normocephalic and atraumatic.      Nose: Nose normal.   Eyes:      Conjunctiva/sclera: Conjunctivae normal.      Pupils: Pupils are equal, round, and reactive to light. "   Cardiovascular:      Rate and Rhythm: Normal rate and regular rhythm.      Heart sounds: Normal heart sounds.   Pulmonary:      Effort: Pulmonary effort is normal.      Breath sounds: Normal breath sounds.   Abdominal:      General: Bowel sounds are normal.      Palpations: Abdomen is soft.   Musculoskeletal:      Cervical back: Neck supple.   Skin:     General: Skin is warm.   Neurological:      General: No focal deficit present.      Mental Status: She is alert.      Gait: Gait normal.   Psychiatric:         Behavior: Behavior normal.          Result Review  Data Reviewed:{ Labs  Result Review  Imaging  Med Tab  Media :23}               Discussed healthy diet, exercise, adequate sleep, cancer screening, immunizations and preventative care. Annual eye exam and routine dental cleaning encouraged.        Assessment and Plan {CC Problem List  Visit Diagnosis  ROS  Review (Popup)  Health Maintenance  Quality  BestPractice  Medications  SmartSets  SnapShot Encounters  Media :23}   Diagnoses and all orders for this visit:    1. Annual physical exam (Primary)  -     Vitamin D,25-Hydroxy    2. Moderate persistent asthma without complication    3. Need for lipid screening  -     Comprehensive Metabolic Panel  -     Lipid Panel    4. Irregular menses  -     Vitamin D,25-Hydroxy  -     CBC & Differential  -     Iron Profile    5. Hair thinning  -     TSH  -     Vitamin B12  -     Vitamin D,25-Hydroxy    6. Migraine without aura and without status migrainosus, not intractable    7. Fatty liver disease, nonalcoholic  -     Comprehensive Metabolic Panel              Patient Instructions   I have ordered lab tests today.  You should receive a phone call or a [employer] message with those results.  If you have not heard from us in 7-10 days, please call the office.      Keep working on diet and exercise.  Calorie restriction is the focus for weight loss.    Ask today about the hair loss when  you see dermatology and  I am checking some additional labs.    I'm looking into your cycles and considering some cycle control may be worthwhile.  Consider discussing this with Dr. Garcia.       No follow-ups on file.    Trina Lechuga MD

## 2025-05-20 NOTE — PATIENT INSTRUCTIONS
I have ordered lab tests today.  You should receive a phone call or a [employer] message with those results.  If you have not heard from us in 7-10 days, please call the office.      Keep working on diet and exercise.  Calorie restriction is the focus for weight loss.    Ask today about the hair loss when  you see dermatology and I am checking some additional labs.    I'm looking into your cycles and considering some cycle control may be worthwhile.  Consider discussing this with Dr. Garcia.

## 2025-05-21 ENCOUNTER — RESULTS FOLLOW-UP (OUTPATIENT)
Dept: FAMILY MEDICINE CLINIC | Facility: CLINIC | Age: 30
End: 2025-05-21
Payer: COMMERCIAL

## 2025-05-21 LAB
25(OH)D3+25(OH)D2 SERPL-MCNC: 25.7 NG/ML (ref 30–100)
ALBUMIN SERPL-MCNC: 4.6 G/DL (ref 3.5–5.2)
ALBUMIN/GLOB SERPL: 1.8 G/DL
ALP SERPL-CCNC: 58 U/L (ref 39–117)
ALT SERPL-CCNC: 36 U/L (ref 1–33)
AST SERPL-CCNC: 27 U/L (ref 1–32)
BASOPHILS # BLD AUTO: 0.09 10*3/MM3 (ref 0–0.2)
BASOPHILS NFR BLD AUTO: 1.2 % (ref 0–1.5)
BILIRUB SERPL-MCNC: 0.4 MG/DL (ref 0–1.2)
BUN SERPL-MCNC: 10 MG/DL (ref 6–20)
BUN/CREAT SERPL: 12.5 (ref 7–25)
CALCIUM SERPL-MCNC: 9.4 MG/DL (ref 8.6–10.5)
CHLORIDE SERPL-SCNC: 105 MMOL/L (ref 98–107)
CHOLEST SERPL-MCNC: 63 MG/DL (ref 0–200)
CO2 SERPL-SCNC: 24.7 MMOL/L (ref 22–29)
CREAT SERPL-MCNC: 0.8 MG/DL (ref 0.57–1)
EGFRCR SERPLBLD CKD-EPI 2021: 101.8 ML/MIN/1.73
EOSINOPHIL # BLD AUTO: 0.28 10*3/MM3 (ref 0–0.4)
EOSINOPHIL NFR BLD AUTO: 3.6 % (ref 0.3–6.2)
ERYTHROCYTE [DISTWIDTH] IN BLOOD BY AUTOMATED COUNT: 13.1 % (ref 12.3–15.4)
GLOBULIN SER CALC-MCNC: 2.5 GM/DL
GLUCOSE SERPL-MCNC: 82 MG/DL (ref 65–99)
HCT VFR BLD AUTO: 44.7 % (ref 34–46.6)
HDLC SERPL-MCNC: 35 MG/DL (ref 40–60)
HGB BLD-MCNC: 14.7 G/DL (ref 12–15.9)
IMM GRANULOCYTES # BLD AUTO: 0.02 10*3/MM3 (ref 0–0.05)
IMM GRANULOCYTES NFR BLD AUTO: 0.3 % (ref 0–0.5)
IRON SATN MFR SERPL: 12 % (ref 20–50)
IRON SERPL-MCNC: 54 MCG/DL (ref 37–145)
LDLC SERPL CALC-MCNC: 18 MG/DL (ref 0–100)
LYMPHOCYTES # BLD AUTO: 1.75 10*3/MM3 (ref 0.7–3.1)
LYMPHOCYTES NFR BLD AUTO: 22.7 % (ref 19.6–45.3)
MCH RBC QN AUTO: 28.4 PG (ref 26.6–33)
MCHC RBC AUTO-ENTMCNC: 32.9 G/DL (ref 31.5–35.7)
MCV RBC AUTO: 86.5 FL (ref 79–97)
MONOCYTES # BLD AUTO: 0.48 10*3/MM3 (ref 0.1–0.9)
MONOCYTES NFR BLD AUTO: 6.2 % (ref 5–12)
NEUTROPHILS # BLD AUTO: 5.1 10*3/MM3 (ref 1.7–7)
NEUTROPHILS NFR BLD AUTO: 66 % (ref 42.7–76)
NRBC BLD AUTO-RTO: 0 /100 WBC (ref 0–0.2)
PLATELET # BLD AUTO: 367 10*3/MM3 (ref 140–450)
POTASSIUM SERPL-SCNC: 4.7 MMOL/L (ref 3.5–5.2)
PROT SERPL-MCNC: 7.1 G/DL (ref 6–8.5)
RBC # BLD AUTO: 5.17 10*6/MM3 (ref 3.77–5.28)
SODIUM SERPL-SCNC: 141 MMOL/L (ref 136–145)
TIBC SERPL-MCNC: 457 MCG/DL
TRIGL SERPL-MCNC: 24 MG/DL (ref 0–150)
TSH SERPL DL<=0.005 MIU/L-ACNC: 1.09 UIU/ML (ref 0.27–4.2)
UIBC SERPL-MCNC: 403 MCG/DL (ref 112–346)
VIT B12 SERPL-MCNC: 668 PG/ML (ref 211–946)
VLDLC SERPL CALC-MCNC: 10 MG/DL (ref 5–40)
WBC # BLD AUTO: 7.72 10*3/MM3 (ref 3.4–10.8)

## (undated) DEVICE — SUT GUT CHRM 0 CT 27IN 914H

## (undated) DEVICE — KENDALL SCD EXPRESS SLEEVES, KNEE LENGTH, MEDIUM: Brand: KENDALL SCD

## (undated) DEVICE — ANTIBACTERIAL UNDYED BRAIDED (POLYGLACTIN 910), SYNTHETIC ABSORBABLE SUTURE: Brand: COATED VICRYL

## (undated) DEVICE — GLV SURG BIOGEL LTX PF 8

## (undated) DEVICE — SOL IRR H2O BTL 1000ML STRL

## (undated) DEVICE — 3M(TM) TEGADERM(TM) TRANSPARENT FILM DRESSING FRAME STYLE 1627: Brand: 3M™ TEGADERM™